# Patient Record
Sex: FEMALE | Race: WHITE | NOT HISPANIC OR LATINO | Employment: OTHER | URBAN - METROPOLITAN AREA
[De-identification: names, ages, dates, MRNs, and addresses within clinical notes are randomized per-mention and may not be internally consistent; named-entity substitution may affect disease eponyms.]

---

## 2017-02-27 ENCOUNTER — ALLSCRIPTS OFFICE VISIT (OUTPATIENT)
Dept: OTHER | Facility: OTHER | Age: 82
End: 2017-02-27

## 2017-02-27 DIAGNOSIS — J98.4 OTHER DISORDERS OF LUNG: ICD-10-CM

## 2017-02-27 DIAGNOSIS — I10 ESSENTIAL (PRIMARY) HYPERTENSION: ICD-10-CM

## 2017-02-27 DIAGNOSIS — F03.90 DEMENTIA WITHOUT BEHAVIORAL DISTURBANCE (HCC): ICD-10-CM

## 2017-02-27 DIAGNOSIS — R60.0 LOCALIZED EDEMA: ICD-10-CM

## 2017-02-27 DIAGNOSIS — Z13.6 ENCOUNTER FOR SCREENING FOR CARDIOVASCULAR DISORDERS: ICD-10-CM

## 2017-07-20 ENCOUNTER — ALLSCRIPTS OFFICE VISIT (OUTPATIENT)
Dept: OTHER | Facility: OTHER | Age: 82
End: 2017-07-20

## 2017-09-05 ENCOUNTER — ALLSCRIPTS OFFICE VISIT (OUTPATIENT)
Dept: OTHER | Facility: OTHER | Age: 82
End: 2017-09-05

## 2017-09-14 ENCOUNTER — GENERIC CONVERSION - ENCOUNTER (OUTPATIENT)
Dept: OTHER | Facility: OTHER | Age: 82
End: 2017-09-14

## 2017-09-14 DIAGNOSIS — M25.562 PAIN IN LEFT KNEE: ICD-10-CM

## 2017-09-14 DIAGNOSIS — Z91.81 HISTORY OF FALLING: ICD-10-CM

## 2017-09-20 ENCOUNTER — GENERIC CONVERSION - ENCOUNTER (OUTPATIENT)
Dept: OTHER | Facility: OTHER | Age: 82
End: 2017-09-20

## 2017-11-17 ENCOUNTER — APPOINTMENT (EMERGENCY)
Dept: CT IMAGING | Facility: HOSPITAL | Age: 82
DRG: 291 | End: 2017-11-17
Payer: COMMERCIAL

## 2017-11-17 ENCOUNTER — APPOINTMENT (EMERGENCY)
Dept: RADIOLOGY | Facility: HOSPITAL | Age: 82
DRG: 291 | End: 2017-11-17
Payer: COMMERCIAL

## 2017-11-17 ENCOUNTER — HOSPITAL ENCOUNTER (INPATIENT)
Facility: HOSPITAL | Age: 82
LOS: 11 days | Discharge: NURSING, REHAB OR LONG-TERM CARE | DRG: 291 | End: 2017-11-28
Attending: EMERGENCY MEDICINE | Admitting: INTERNAL MEDICINE
Payer: COMMERCIAL

## 2017-11-17 DIAGNOSIS — F02.80 ALZHEIMER DISEASE (HCC): ICD-10-CM

## 2017-11-17 DIAGNOSIS — G30.9 ALZHEIMER DISEASE (HCC): ICD-10-CM
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
## 2017-11-17 DIAGNOSIS — R53.1 GENERALIZED WEAKNESS: ICD-10-CM

## 2017-11-17 DIAGNOSIS — I50.9 CHF EXACERBATION (HCC): Primary | ICD-10-CM

## 2017-11-17 PROBLEM — R33.9 URINARY RETENTION: Status: ACTIVE | Noted: 2017-11-17

## 2017-11-17 LAB
ALBUMIN SERPL BCP-MCNC: 2 G/DL (ref 3.5–5)
ALP SERPL-CCNC: 109 U/L (ref 46–116)
ALT SERPL W P-5'-P-CCNC: 24 U/L (ref 12–78)
ANION GAP SERPL CALCULATED.3IONS-SCNC: 7 MMOL/L (ref 4–13)
APTT PPP: 30 SECONDS (ref 23–35)
AST SERPL W P-5'-P-CCNC: 36 U/L (ref 5–45)
BACTERIA UR QL AUTO: ABNORMAL /HPF
BASOPHILS # BLD AUTO: 0.03 THOUSANDS/ΜL (ref 0–0.1)
BASOPHILS NFR BLD AUTO: 0 % (ref 0–1)
BILIRUB SERPL-MCNC: 0.31 MG/DL (ref 0.2–1)
BILIRUB UR QL STRIP: ABNORMAL
BUN SERPL-MCNC: 23 MG/DL (ref 5–25)
CALCIUM SERPL-MCNC: 8.4 MG/DL (ref 8.3–10.1)
CHLORIDE SERPL-SCNC: 103 MMOL/L (ref 100–108)
CLARITY UR: CLEAR
CO2 SERPL-SCNC: 28 MMOL/L (ref 21–32)
COLOR UR: ABNORMAL
CREAT SERPL-MCNC: 0.79 MG/DL (ref 0.6–1.3)
EOSINOPHIL # BLD AUTO: 0.02 THOUSAND/ΜL (ref 0–0.61)
EOSINOPHIL NFR BLD AUTO: 0 % (ref 0–6)
ERYTHROCYTE [DISTWIDTH] IN BLOOD BY AUTOMATED COUNT: 15.4 % (ref 11.6–15.1)
GFR SERPL CREATININE-BSD FRML MDRD: 68 ML/MIN/1.73SQ M
GLUCOSE SERPL-MCNC: 102 MG/DL (ref 65–140)
GLUCOSE UR STRIP-MCNC: NEGATIVE MG/DL
HCT VFR BLD AUTO: 32.1 % (ref 34.8–46.1)
HGB BLD-MCNC: 10.1 G/DL (ref 11.5–15.4)
HGB UR QL STRIP.AUTO: NEGATIVE
HYALINE CASTS #/AREA URNS LPF: ABNORMAL /LPF
INR PPP: 1.16 (ref 0.86–1.16)
KETONES UR STRIP-MCNC: NEGATIVE MG/DL
LEUKOCYTE ESTERASE UR QL STRIP: ABNORMAL
LYMPHOCYTES # BLD AUTO: 1.33 THOUSANDS/ΜL (ref 0.6–4.47)
LYMPHOCYTES NFR BLD AUTO: 11 % (ref 14–44)
MCH RBC QN AUTO: 27.7 PG (ref 26.8–34.3)
MCHC RBC AUTO-ENTMCNC: 31.5 G/DL (ref 31.4–37.4)
MCV RBC AUTO: 88 FL (ref 82–98)
MONOCYTES # BLD AUTO: 0.94 THOUSAND/ΜL (ref 0.17–1.22)
MONOCYTES NFR BLD AUTO: 7 % (ref 4–12)
MUCOUS THREADS UR QL AUTO: ABNORMAL
NEUTROPHILS # BLD AUTO: 10.31 THOUSANDS/ΜL (ref 1.85–7.62)
NEUTS SEG NFR BLD AUTO: 82 % (ref 43–75)
NITRITE UR QL STRIP: NEGATIVE
NON-SQ EPI CELLS URNS QL MICRO: ABNORMAL /HPF
NRBC BLD AUTO-RTO: 0 /100 WBCS
NT-PROBNP SERPL-MCNC: 3606 PG/ML
PH UR STRIP.AUTO: 5.5 [PH] (ref 4.5–8)
PLATELET # BLD AUTO: 301 THOUSANDS/UL (ref 149–390)
PMV BLD AUTO: 9.1 FL (ref 8.9–12.7)
POTASSIUM SERPL-SCNC: 4 MMOL/L (ref 3.5–5.3)
PROT SERPL-MCNC: 6.6 G/DL (ref 6.4–8.2)
PROT UR STRIP-MCNC: ABNORMAL MG/DL
PROTHROMBIN TIME: 14.8 SECONDS (ref 12.1–14.4)
RBC # BLD AUTO: 3.64 MILLION/UL (ref 3.81–5.12)
RBC #/AREA URNS AUTO: ABNORMAL /HPF
SODIUM SERPL-SCNC: 138 MMOL/L (ref 136–145)
SP GR UR STRIP.AUTO: 1.02 (ref 1–1.03)
SPECIMEN SOURCE: NORMAL
TROPONIN I BLD-MCNC: 0.02 NG/ML (ref 0–0.08)
UROBILINOGEN UR QL STRIP.AUTO: 1 E.U./DL
WBC # BLD AUTO: 12.63 THOUSAND/UL (ref 4.31–10.16)
WBC #/AREA URNS AUTO: ABNORMAL /HPF

## 2017-11-17 PROCEDURE — 93005 ELECTROCARDIOGRAM TRACING: CPT | Performed by: EMERGENCY MEDICINE

## 2017-11-17 PROCEDURE — 36415 COLL VENOUS BLD VENIPUNCTURE: CPT | Performed by: EMERGENCY MEDICINE

## 2017-11-17 PROCEDURE — 85610 PROTHROMBIN TIME: CPT | Performed by: EMERGENCY MEDICINE

## 2017-11-17 PROCEDURE — 87040 BLOOD CULTURE FOR BACTERIA: CPT | Performed by: PHYSICIAN ASSISTANT

## 2017-11-17 PROCEDURE — 84484 ASSAY OF TROPONIN QUANT: CPT

## 2017-11-17 PROCEDURE — 71020 HB CHEST X-RAY 2VW FRONTAL&LATL: CPT

## 2017-11-17 PROCEDURE — 81002 URINALYSIS NONAUTO W/O SCOPE: CPT | Performed by: EMERGENCY MEDICINE

## 2017-11-17 PROCEDURE — 70450 CT HEAD/BRAIN W/O DYE: CPT

## 2017-11-17 PROCEDURE — 80053 COMPREHEN METABOLIC PANEL: CPT | Performed by: EMERGENCY MEDICINE

## 2017-11-17 PROCEDURE — 85025 COMPLETE CBC W/AUTO DIFF WBC: CPT | Performed by: EMERGENCY MEDICINE

## 2017-11-17 PROCEDURE — 99285 EMERGENCY DEPT VISIT HI MDM: CPT

## 2017-11-17 PROCEDURE — 81001 URINALYSIS AUTO W/SCOPE: CPT

## 2017-11-17 PROCEDURE — 83880 ASSAY OF NATRIURETIC PEPTIDE: CPT | Performed by: EMERGENCY MEDICINE

## 2017-11-17 PROCEDURE — 85730 THROMBOPLASTIN TIME PARTIAL: CPT | Performed by: EMERGENCY MEDICINE

## 2017-11-17 RX ORDER — POTASSIUM CHLORIDE 750 MG/1
10 TABLET, FILM COATED, EXTENDED RELEASE ORAL EVERY OTHER DAY
COMMUNITY
End: 2017-11-28 | Stop reason: HOSPADM

## 2017-11-17 RX ORDER — ONDANSETRON 2 MG/ML
4 INJECTION INTRAMUSCULAR; INTRAVENOUS EVERY 6 HOURS PRN
Status: DISCONTINUED | OUTPATIENT
Start: 2017-11-17 | End: 2017-11-21

## 2017-11-17 RX ORDER — MEMANTINE HYDROCHLORIDE 5 MG/1
10 TABLET ORAL
Status: DISCONTINUED | OUTPATIENT
Start: 2017-11-17 | End: 2017-11-28 | Stop reason: HOSPADM

## 2017-11-17 RX ORDER — FUROSEMIDE 10 MG/ML
20 INJECTION INTRAMUSCULAR; INTRAVENOUS
Status: DISCONTINUED | OUTPATIENT
Start: 2017-11-17 | End: 2017-11-18

## 2017-11-17 RX ORDER — DONEPEZIL HYDROCHLORIDE 5 MG/1
10 TABLET, FILM COATED ORAL
Status: DISCONTINUED | OUTPATIENT
Start: 2017-11-17 | End: 2017-11-28 | Stop reason: HOSPADM

## 2017-11-17 RX ORDER — POTASSIUM CHLORIDE 20 MEQ/1
20 TABLET, EXTENDED RELEASE ORAL
Status: DISCONTINUED | OUTPATIENT
Start: 2017-11-18 | End: 2017-11-25

## 2017-11-17 RX ORDER — FUROSEMIDE 20 MG/1
20 TABLET ORAL DAILY
COMMUNITY
End: 2017-11-28 | Stop reason: HOSPADM

## 2017-11-17 RX ORDER — AMLODIPINE BESYLATE 5 MG/1
5 TABLET ORAL DAILY
COMMUNITY
End: 2017-11-28 | Stop reason: HOSPADM

## 2017-11-17 RX ORDER — ACETAMINOPHEN 325 MG/1
650 TABLET ORAL EVERY 4 HOURS PRN
Status: DISCONTINUED | OUTPATIENT
Start: 2017-11-17 | End: 2017-11-21

## 2017-11-17 RX ORDER — HEPARIN SODIUM 5000 [USP'U]/ML
5000 INJECTION, SOLUTION INTRAVENOUS; SUBCUTANEOUS EVERY 8 HOURS SCHEDULED
Status: DISCONTINUED | OUTPATIENT
Start: 2017-11-17 | End: 2017-11-28 | Stop reason: HOSPADM

## 2017-11-17 RX ORDER — CALCIUM CARBONATE 200(500)MG
1000 TABLET,CHEWABLE ORAL DAILY PRN
Status: DISCONTINUED | OUTPATIENT
Start: 2017-11-17 | End: 2017-11-28 | Stop reason: HOSPADM

## 2017-11-17 RX ADMIN — HEPARIN SODIUM 5000 UNITS: 5000 INJECTION, SOLUTION INTRAVENOUS; SUBCUTANEOUS at 21:34

## 2017-11-17 RX ADMIN — Medication 1000 MG: at 17:37

## 2017-11-17 RX ADMIN — FUROSEMIDE 20 MG: 10 INJECTION, SOLUTION INTRAMUSCULAR; INTRAVENOUS at 19:34

## 2017-11-17 RX ADMIN — DONEPEZIL HYDROCHLORIDE 10 MG: 5 TABLET, FILM COATED ORAL at 21:33

## 2017-11-17 RX ADMIN — MEMANTINE 10 MG: 5 TABLET ORAL at 21:37

## 2017-11-17 NOTE — ED NOTES
Pt attempted to give urine sample via bedpan  Unable to provide sample, states no urge to go  Spoke with Escobar Marie PA-C and VO to straight cath pt        Divine Barbour RN  11/17/17 1941

## 2017-11-17 NOTE — ED PROVIDER NOTES
History  Chief Complaint   Patient presents with    Fatigue     Pt presents with EMS coming from home, per family pt has been "slowing down" and seems to be becoming weaker  Pt has a productive cough with yellow sputum, denies any CP/SOB/N/V  History provided by:  Patient and EMS personnel  History limited by:  Dementia   used: No    Fatigue   Severity:  Moderate  Onset quality:  Gradual  Duration: unable to specify  Timing:  Constant  Progression:  Worsening  Chronicity:  New  Context comment:  Recentkly with worsening generalized weakness, cough, phlem, dyspnea  Relieved by:  Nothing  Worsened by:  Nothing  Ineffective treatments:  None tried  Associated symptoms: cough and difficulty walking    Associated symptoms: no abdominal pain, no chest pain, no dysuria, no fever, no headaches, no loss of consciousness, no nausea, no seizures, no shortness of breath, no syncope and no vomiting    Cough:     Cough characteristics:  Productive    Sputum characteristics:  Yellow    Severity:  Mild    Onset quality:  Gradual    Timing:  Intermittent    Progression:  Waxing and waning    Chronicity:  New  Risk factors: congestive heart failure        Prior to Admission Medications   Prescriptions Last Dose Informant Patient Reported? Taking? Memantine HCl-Donepezil HCl (NAMZARIC) 14-10 MG CP24   Yes Yes   Sig: Take by mouth   amLODIPine (NORVASC) 5 mg tablet   Yes Yes   Sig: Take 5 mg by mouth daily   furosemide (LASIX) 20 mg tablet   Yes Yes   Sig: Take 20 mg by mouth daily   potassium chloride (K-DUR) 10 mEq tablet   Yes Yes   Sig: Take 10 mEq by mouth every other day        Facility-Administered Medications: None       Past Medical History:   Diagnosis Date    Alzheimer disease     CHF [facility]congestive heart failure) ([facility] [address] )     Hypertension        History reviewed  No pertinent surgical history  History reviewed  No pertinent family history    I have reviewed and agree with the history as documented  Social History   Substance Use Topics    Smoking status: Current Every Day Smoker     Packs/day: 1 00    Smokeless tobacco: Not on file    Alcohol use No        Review of Systems   Unable to perform ROS: Dementia   Constitutional: Positive for fatigue  Negative for fever  Respiratory: Positive for cough  Negative for shortness of breath  Cardiovascular: Negative for chest pain and syncope  Gastrointestinal: Negative for abdominal pain, nausea and vomiting  Genitourinary: Negative for dysuria  Neurological: Negative for seizures, loss of consciousness and headaches  All other systems reviewed and are negative  Physical Exam  ED Triage Vitals [11/17/17 1406]   Temperature Pulse Respirations Blood Pressure SpO2   98 3 °F (36 8 °C) 80 22 101/52 91 %      Temp Source Heart Rate Source Patient Position - Orthostatic VS BP Location FiO2 (%)   Oral Monitor Lying Right arm --      Pain Score       No Pain           Orthostatic Vital Signs  Vitals:    11/17/17 1521 11/17/17 1625 11/17/17 1708 11/17/17 1814   BP: 106/59 104/52 128/58 131/57   Pulse: 85 96 81 94   Patient Position - Orthostatic VS: Lying Lying Lying Lying       Physical Exam   Constitutional: She is oriented to person, place, and time  She appears well-developed and well-nourished  No distress  HENT:   Head: Normocephalic and atraumatic  Eyes: EOM are normal    Neck: Normal range of motion  Neck supple  Cardiovascular: Normal rate, regular rhythm, normal heart sounds and intact distal pulses  Pulmonary/Chest: Effort normal and breath sounds normal    Abdominal: Soft  Bowel sounds are normal  There is no tenderness  There is no rebound and no guarding  Musculoskeletal: Normal range of motion  Neurological: She is alert and oriented to person, place, and time  No cranial nerve deficit  Coordination normal    Skin: Skin is warm and dry  Psychiatric: She has a normal mood and affect     Nursing note and vitals reviewed  ED Medications  Medications   potassium chloride (K-DUR,KLOR-CON) CR tablet 20 mEq (not administered)   memantine (NAMENDA) tablet 10 mg (not administered)   donepezil (ARICEPT) tablet 10 mg (not administered)   ceftriaxone (ROCEPHIN) 1 g/50 mL in dextrose IVPB (1,000 mg Intravenous New Bag 11/17/17 1737)   ondansetron (ZOFRAN) injection 4 mg (not administered)   calcium carbonate (TUMS) chewable tablet 1,000 mg (not administered)   furosemide (LASIX) injection 20 mg (not administered)   heparin (porcine) subcutaneous injection 5,000 Units (not administered)   acetaminophen (TYLENOL) tablet 650 mg (not administered)       Diagnostic Studies  Results Reviewed     Procedure Component Value Units Date/Time    Platelet count [[id number]]     Lab Status:  No result Specimen:  Blood     Urine Microscopic [[id number]]  (Abnormal) Collected:  11/17/17 1831    Lab Status:  Final result Specimen:  Urine from Urine, Other Updated:  11/17/17 1739     RBC, UA None Seen /hpf      WBC, UA 4-10 (A) /hpf      Epithelial Cells Occasional /hpf      Bacteria, UA Moderate (A) /hpf      Hyaline Casts, UA 1-2 (A) /lpf      MUCOUS THREADS Occasional    Blood culture [[id number]] Collected:  11/17/17 1731    Lab Status: In process Specimen:  Blood from Arm, Right Updated:  11/17/17 1734    Blood culture [[id number]] Collected:  11/17/17 1429    Lab Status:   In process Specimen:  Blood from Arm, Left Updated:  11/17/17 1733    POCT urinalysis dipstick [[id number]]  (Abnormal) Resulted:  11/17/17 1716    Lab Status:  Final result Specimen:  Urine Updated:  11/17/17 1717    ED Urine Macroscopic [[id number]]  (Abnormal) Collected:  11/17/17 1831    Lab Status:  Final result Specimen:  Urine Updated:  11/17/17 1715     Color, UA Gail     Clarity, UA Clear     pH, UA 5 5     Leukocytes, UA Trace (A)     Nitrite, UA Negative     Protein,  (2+) (A) mg/dl      Glucose, UA Negative mg/dl      Ketones, UA Negative mg/dl      Urobilinogen, UA 1 0 E U /dl      Bilirubin, UA Interference- unable to analyze (A)     Blood, UA Negative     Specific Gravity, UA 1 020    Narrative:       CLINITEK RESULT    B-type natriuretic peptide [[id number]]  (Abnormal) Collected:  11/17/17 1429    Lab Status:  Final result Specimen:  Blood from Arm, Left Updated:  11/17/17 1516     NT-proBNP 3,606 (H) pg/mL     Comprehensive metabolic panel [[id number]]  (Abnormal) Collected:  11/17/17 1429    Lab Status:  Final result Specimen:  Blood from Arm, Left Updated:  11/17/17 1455     Sodium 138 mmol/L      Potassium 4 0 mmol/L      Chloride 103 mmol/L      CO2 28 mmol/L      Anion Gap 7 mmol/L      BUN 23 mg/dL      Creatinine 0 79 mg/dL      Glucose 102 mg/dL      Calcium 8 4 mg/dL      AST 36 U/L      ALT 24 U/L      Alkaline Phosphatase 109 U/L      Total Protein 6 6 g/dL      Albumin 2 0 (L) g/dL      Total Bilirubin 0 31 mg/dL      eGFR 68 ml/min/1 73sq m     Narrative:         National Kidney Disease Education Program recommendations are as follows:  GFR calculation is accurate only with a steady state creatinine  Chronic Kidney disease less than 60 ml/min/1 73 sq  meters  Kidney failure less than 15 ml/min/1 73 sq  meters  Protime-INR [[id number]]  (Abnormal) Collected:  11/17/17 1429    Lab Status:  Final result Specimen:  Blood from Arm, Left Updated:  11/17/17 1452     Protime 14 8 (H) seconds      INR 1 16    APTT [[id number]]  (Normal) Collected:  11/17/17 1429    Lab Status:  Final result Specimen:  Blood from Arm, Left Updated:  11/17/17 1452     PTT 30 seconds     Narrative:          Therapeutic Heparin Range = 60-90 seconds    POCT troponin [[id number]]  (Normal) Collected:  11/17/17 1432    Lab Status:  Final result Updated:  11/17/17 1445     POC Troponin I 0 02 ng/ml      Specimen Type VENOUS    Narrative:         [employer] i-Stat handheld analyzer 99% cutoff is > 0 08ng/mL in network Emergency Departments    o cTnI 99% cutoff is useful only when applied to patients in the clinical setting of myocardial ischemia  o cTnI 99% cutoff should be interpreted in the context of clinical history, ECG findings and possibly cardiac imaging to establish correct diagnosis  o cTnI 99% cutoff may be suggestive but clearly not indicative of a coronary event without the clinical setting of myocardial ischemia  CBC and differential [[id number]]  (Abnormal) Collected:  11/17/17 1429    Lab Status:  Final result Specimen:  Blood from Arm, Left Updated:  11/17/17 1437     WBC 12 63 (H) Thousand/uL      RBC 3 64 (L) Million/uL      Hemoglobin 10 1 (L) g/dL      Hematocrit 32 1 (L) %      MCV 88 fL      MCH 27 7 pg      MCHC 31 5 g/dL      RDW 15 4 (H) %      MPV 9 1 fL      Platelets 348 Thousands/uL      nRBC 0 /100 WBCs      Neutrophils Relative 82 (H) %      Lymphocytes Relative 11 (L) %      Monocytes Relative 7 %      Eosinophils Relative 0 %      Basophils Relative 0 %      Neutrophils Absolute 10 31 (H) Thousands/µL      Lymphocytes Absolute 1 33 Thousands/µL      Monocytes Absolute 0 94 Thousand/µL      Eosinophils Absolute 0 02 Thousand/µL      Basophils Absolute 0 03 Thousands/µL                  CT head without contrast   Final Result by Renea Espinosa MD (11/17 7127)      No acute intracranial abnormality  Workstation performed: [id number]         XR chest 2 views   Final Result by Addie Ballard MD (11/17 4870)   Increased interstitial and vascular congestion  Patchy infiltrates right mid to lower lung field  Increased soft tissue density also noted in the left infrahilar region  Probable small effusions  Consider CT evaluation           Workstation performed: [id number]                    Procedures  ECG 12 Lead Documentation  Date/Time: 11/17/2017 2:30 PM  Performed by: Estefanía Russell  Authorized by: Estefanía Russell     Indications / Diagnosis:  Weakness  ECG reviewed by me, the ED Provider: yes    Patient location:  ED  Interpretation:     Interpretation: normal    Rate: ECG rate:  79    ECG rate assessment: normal    Rhythm:     Rhythm: sinus rhythm    Ectopy:     Ectopy: none    QRS:     QRS axis:  Normal  Conduction:     Conduction: normal    ST segments:     ST segments:  Normal  T waves:     T waves: normal             Phone Contacts  ED Phone Contact    ED Course  ED Course as of Nov 17 1831 Fri Nov 17, 2017   1438 Hb compared to [facility] AT THE [facility] records from July 2017, was 11, so around baseline  Hemoglobin: (!) 10 1   1448 Family arrived, spoke with them, patient has been feeling weak over week, cough, yellow phlem, fell 3 times in Last week; family feel that patient is getting severe weakness and hard to get around at home  [address], chest x-ray, EKG repeat reviewed, BNP elevated at 3600, pleural effusions on the chest x-ray, consistent with congestive heart failure exacerbation, will admit to Medicine  MDM  Number of Diagnoses or Management Options  CHF exacerbation [facility]): new and requires workup  Diagnosis management comments: Patient with hx of HTN, CHF, Dementia, comes from home, with c/o worsening generalized weakness, cough, phlem, dyspnea; EMS noted low sats on arrival 89-90%, gave Albuterol neb, put on O2 2 l NC, sats came upto 96-98%  On exam,m alert, oriented x2, no focal neuro deficit, intact cranial nerve and peripeheral motor/sensory exam; Lungs diminished entry at bases; no significant peripheral edema; CVS RRR, Abd soft, NTND  D/D: Pneumonia, CHF Exacerbation, COPD, Electrolyte/Metabolic imbalance, YANNI, Dehydration  We will check labs, CBC, CMP, Trop, EKG, CXR, Urine; would need admission as requiring oxygen at this point         Amount and/or Complexity of Data Reviewed  Clinical lab tests: ordered and reviewed  Tests in the radiology section of CPT®: ordered and reviewed  Tests in the medicine section of CPT®: ordered and reviewed  Obtain history from someone other than the patient: yes (Family)  Discuss the patient with other providers: yes  Independent visualization of images, tracings, or specimens: yes      CritCare Time    Disposition  Final diagnoses:   CHF exacerbation ([facility] [address] )   Fall   Generalized weakness     Time reflects when diagnosis was documented in both MDM as applicable and the Disposition within this note     Time User Action Codes Description Comment    11/17/2017  4:32 PM Margret Hall Add [I50 9] CHF exacerbation ([facility] [address] )     11/17/2017  6:29 PM Margret Hall Add Taya Kerrie  XXXA] Fall     11/17/2017  6:30 PM Margret Hall Add [R53 1] Generalized weakness       ED Disposition     ED Disposition Condition Comment    Admit  Case was discussed with JOANN Swain) and the patient's admission status was agreed to be Admission Status: inpatient status to the service of Dr Denis Collins  Follow-up Information    None       Current Discharge Medication List      CONTINUE these medications which have NOT CHANGED    Details   amLODIPine (NORVASC) 5 mg tablet Take 5 mg by mouth daily      furosemide (LASIX) 20 mg tablet Take 20 mg by mouth daily      Memantine HCl-Donepezil HCl (NAMZARIC) 14-10 MG CP24 Take by mouth      potassium chloride (K-DUR) 10 mEq tablet Take 10 mEq by mouth every other day             No discharge procedures on file      ED Provider  Electronically Signed by           Isela Bryan MD  11/17/17 9314

## 2017-11-17 NOTE — H&P
History and Physical - [facility] Internal Medicine    Patient Information: Aki Agarwal 80 y o  female MRN: [id number]  Unit/Bed#: ED 15 Encounter: [id number]  Admitting Physician: Rona Melvin PA-C  PCP: Juan Portillo DO  Date of Admission:  11/17/17    Assessment/Plan  Patient is a pleasantly demented 26-year-old female who lives with her family  She is becoming increasingly weak and somnolent  The family also has a caregiver who comes in every morning  They have noticed that her brief overnight has been dry which is unusual for her  EMS was activated and they noticed that her oxygen saturations were hovering at 80-89% the patient was placed on 2 L of oxygen and given a DuoNeb and her oxygen saturations quickly came up  Upon presentation in the ER the patient was afebrile her oxygen saturation was 91% and her blood pressure was a little bit soft 101/52  Her blood pressure did come up to 587 systolic  Her labs were notable for a proBNP of 3606, comprehensive metabolic panel within normal limits  White blood cell count 12 6 hemoglobin 10 1  Troponin negative  CT of the head no acute intracranial abnormality  Chest x-ray increased interstitial and vascular congestion with a patchy infiltrate in the right mid to lower lung field increased soft tissue density also noted in the left infrahilar region probable small effusions  Consider CT evaluation  Also since the patient had not been able to urinate at she was bladder scanned and had 375 cc  Straight cath with dark cloudy louann urine  Positive for leukocytes negative for nitrates      Hospital Problem List:     Principal Problem:    Acute CHF (congestive heart failure) ([facility])  Active Problems:    Hypertension    CHF (congestive heart failure) ([facility] [address] )    Alzheimer disease    Urinary retention      Plan for the Primary Problem(s):  1  Acute congestive heart failure-unknown type    The patient has vascular congestion on chest x-ray, and her lungs are coarse with bilateral basilar crackles  Favor CHF over pneumonia given the urinary retention and fluid buildup  She was mildly hypoxic for EMS  Her proBNP is over 3600  In review of records from [facility] it was in the [address] in July 2017  Cautiously start Lasix 20 mg IV b i d   Monitor given her softer blood pressures  Will get an echocardiogram and kindly consult Cardiology for their input    Plan for Additional Problems:   2  Urinary retention with probable urinary tract infection-bladder scan for 375 cc  Patient's family tells me she has not urinated for approximately 24 hours  Urinalysis is negative for nitrates positive for leukocytes  Patient did have a mildly elevated white blood cell count of 12 but was afebrile  Will treat the patient with ceftriaxone  Blood cultures will be obtained prior to antibiotics  Follow culture and discontinue if cultures negative  3  Essential hypertension with hypotension-blood pressures have been 360-500 systolic  Hold the Norvasc in favor of the Lasix IV b i d   Monitor  4  Alzheimer's disease-the patient is on the combination medication Namzaric  It is non formulary here we will order them separately  5  Asthenia-PT OT consult  6  Tobacco use-the patient's family tells me that she still smokes or more accurately she still lights up cigarettes takes 2 puffs and lets them burn out  Patient's family requested not to give her a nicotine patch if there are concerns that would be providing her with more nicotine and she normally intake in a day    VTE Prophylaxis: Heparin   Code Status: dni/dnr per family at bedside    Anticipated Length of Stay:  Patient will be admitted on an Inpatient basis with an anticipated length of stay of  Greater than 2 midnights  Total Time for Visit, including Counseling / Coordination of Care: 45 minutes  Greater than 50% of this total time spent on direct patient counseling and coordination of care      Chief Complaint:     Weak, cough    History of Present Illness:    Juan Winchester is a 80 y o  female who presents with increasing weakness and somnolence  Cough productive at times  No urine output for approximately 24 hours  The patient always complains of being cold  The patient herself is a poor historian secondary to dementia  The family says she had normally has a very good appetite but at has not been eating as much as she usually does in the past 2 days  The patient herself denies any complaints at present    Review of Systems:  A 12-point review of systems was done  Please see the HPI for the full details  All other systems negative  Past Medical and Surgical History:     Past Medical History:   Diagnosis Date    Alzheimer disease     [facility]congest[facility] heart [facility]) ([facility] [address] )     Hypertension        History reviewed  No pertinent surgical history  Meds/Allergies:    Current Facility-Administered Medications   Medication Dose Route Frequency Provider Last Rate Last Dose    ceftriaxone (ROCEPHIN) 1 g/50 mL in dextrose IVPB  1,000 mg Intravenous Q24H Mai Alonzo PA-C         Current Outpatient Prescriptions   Medication Sig Dispense Refill    amLODIPine (NORVASC) 5 mg tablet Take 5 mg by mouth daily      furosemide (LASIX) 20 mg tablet Take 20 mg by mouth daily      Memantine HCl-Donepezil HCl (NAMZARIC) 14-10 MG CP24 Take by mouth      potassium chloride (K-DUR) 10 mEq tablet Take 10 mEq by mouth       No Known Allergies  History:     Marital Status: Single     Substance Use History:   History   Alcohol Use No     History   Smoking Status    Current Every Day Smoker   Smokeless Tobacco    Not on file     History   Drug Use No       Family History:    History reviewed  No pertinent family history      Physical Exam:   Vitals:   Blood Pressure: 128/58 (11/17/17 1708)  Pulse: 81 (11/17/17 1708)  Temperature: 98 3 °F (36 8 °C) (11/17/17 1406)  Temp Source: Oral (11/17/17 1406)  Respirations: 20 (11/17/17 1708)  Weight - Scale: 65 5 kg (144 lb 6 4 oz) (11/17/17 1406)  SpO2: 99 % (11/17/17 1708)    General Appearance:    Alert, cooperative, no distress, appears older th an stated age   [de-identified]:    Atraumatic, EOMs intact, Mucous membranes moist without   exudates   Neck:   Supple, symmetrical, trachea midline, no adenopathy;     thyroid:  no enlargement/tenderness/nodules; no carotid    bruit or JVD   Lungs:     Coarse with bilateral basilar crackles, increased work of breathing but no respiratory distress   Chest Wall:    No tenderness or deformity    Heart:    Regular rate and rhythm, S1 and S2 normal, no murmur, rub    or gallop   Abdomen:     Soft, non-tender, bowel sounds active all four quadrants,     no masses, no organomegaly   Extremities:   Extremities normal, atraumatic, no cyanosis or edema   Pulses:   2+ and symmetric all extremities   Skin:   Skin color, texture, turgor normal, no rashes or lesions   Lymph nodes:   Cervical, supraclavicular, and axillary nodes normal   Neurologic:   CNII-XII intact, very weak and deconditioned  Difficulty sitting upright to listen to her posterior lungs even with assistance       Lab Results: I have personally reviewed pertinent reports  Results from last 7 days  Lab Units 11/17/17  1429   WBC Thousand/uL 12 63*   HEMOGLOBIN g/dL 10 1*   HEMATOCRIT % 32 1*   PLATELETS Thousands/uL 301   NEUTROS PCT % 82*   LYMPHS PCT % 11*   MONOS PCT % 7   EOS PCT % 0       Results from last 7 days  Lab Units 11/17/17  1429   SODIUM mmol/L 138   POTASSIUM mmol/L 4 0   CHLORIDE mmol/L 103   CO2 mmol/L 28   BUN mg/dL 23   CREATININE mg/dL 0 79   CALCIUM mg/dL 8 4   TOTAL PROTEIN g/dL 6 6   BILIRUBIN TOTAL mg/dL 0 31   ALK PHOS U/L 109   ALT U/L 24   AST U/L 36   GLUCOSE RANDOM mg/dL 102       Results from last 7 days  Lab Units 11/17/17  1429   INR  1 16     Trop 0 02  BNP 3606 (621 July 2017 at [facility] THE [facility])    Imaging: I have personally reviewed pertinent reports        Xr Chest 2 Views    Result Date: 11/17/2017  Narrative: CHEST INDICATION:  Cough, weakness  History taken directly from the electronic ordering system  COMPARISON:  None VIEWS:  Frontal and lateral projections IMAGES:  2 FINDINGS:     Normal cardiac silhouette  Increased interstitial and vascular congestion  Patchy infiltrates right mid to lower lung field  Increased soft tissue density also noted in the left infrahilar region  Probable small effusions  No pneumothorax  Visualized osseous structures appear within normal limits for the patient's age  Impression: Increased interstitial and vascular congestion  Patchy infiltrates right mid to lower lung field  Increased soft tissue density also noted in the left infrahilar region  Probable small effusions  Consider CT evaluation  Workstation performed: [id number]     Ct Head Without Contrast    Result Date: 11/17/2017  Narrative: CT BRAIN - WITHOUT CONTRAST INDICATION:  Fall, weakness  COMPARISON:  None  TECHNIQUE:  CT examination of the brain was performed  In addition to axial images, coronal reformatted images were created and submitted for interpretation  Radiation dose length product (DLP) for this visit:  1048 mGy-cm   This examination, like all CT scans performed in the [facility], was performed utilizing techniques to minimize radiation dose exposure, including the use of iterative reconstruction and automated exposure control  IMAGE QUALITY:  Diagnostic  FINDINGS:  PARENCHYMA:  Decreased attenuation is noted in the supratentorial white matter demonstrating an appearance most consistent with mild microangiopathic change  No intracranial mass, mass effect or midline shift  No CT signs of acute infarction  There is no parenchymal hemorrhage  VENTRICLES AND EXTRA-AXIAL SPACES:  Normal for patient's age  VISUALIZED ORBITS AND PARANASAL SINUSES:  Unremarkable   CALVARIUM AND EXTRACRANIAL SOFT TISSUES:   Normal      Impression: No acute intracranial abnormality  Workstation performed: [id number]         [employer] Records Reviewed: Yes     This note has been constructed using a voice recognition system

## 2017-11-18 ENCOUNTER — APPOINTMENT (INPATIENT)
Dept: CT IMAGING | Facility: HOSPITAL | Age: 82
DRG: 291 | End: 2017-11-18
Payer: COMMERCIAL

## 2017-11-18 LAB
ANION GAP SERPL CALCULATED.3IONS-SCNC: 7 MMOL/L (ref 4–13)
BUN SERPL-MCNC: 22 MG/DL (ref 5–25)
CALCIUM SERPL-MCNC: 8.1 MG/DL (ref 8.3–10.1)
CHLORIDE SERPL-SCNC: 103 MMOL/L (ref 100–108)
CO2 SERPL-SCNC: 28 MMOL/L (ref 21–32)
CREAT SERPL-MCNC: 0.67 MG/DL (ref 0.6–1.3)
ERYTHROCYTE [DISTWIDTH] IN BLOOD BY AUTOMATED COUNT: 15.5 % (ref 11.6–15.1)
GFR SERPL CREATININE-BSD FRML MDRD: 80 ML/MIN/1.73SQ M
GLUCOSE SERPL-MCNC: 100 MG/DL (ref 65–140)
GLUCOSE SERPL-MCNC: 300 MG/DL (ref 65–140)
GLUCOSE SERPL-MCNC: 89 MG/DL (ref 65–140)
HCT VFR BLD AUTO: 31.9 % (ref 34.8–46.1)
HGB BLD-MCNC: 9.9 G/DL (ref 11.5–15.4)
MCH RBC QN AUTO: 27.3 PG (ref 26.8–34.3)
MCHC RBC AUTO-ENTMCNC: 31 G/DL (ref 31.4–37.4)
MCV RBC AUTO: 88 FL (ref 82–98)
PLATELET # BLD AUTO: 298 THOUSANDS/UL (ref 149–390)
PMV BLD AUTO: 8.9 FL (ref 8.9–12.7)
POTASSIUM SERPL-SCNC: 3.8 MMOL/L (ref 3.5–5.3)
RBC # BLD AUTO: 3.62 MILLION/UL (ref 3.81–5.12)
SODIUM SERPL-SCNC: 138 MMOL/L (ref 136–145)
WBC # BLD AUTO: 14.37 THOUSAND/UL (ref 4.31–10.16)

## 2017-11-18 PROCEDURE — 82948 REAGENT STRIP/BLOOD GLUCOSE: CPT

## 2017-11-18 PROCEDURE — 71260 CT THORAX DX C+: CPT

## 2017-11-18 PROCEDURE — G8988 SELF CARE GOAL STATUS: HCPCS

## 2017-11-18 PROCEDURE — 85027 COMPLETE CBC AUTOMATED: CPT | Performed by: PHYSICIAN ASSISTANT

## 2017-11-18 PROCEDURE — G8987 SELF CARE CURRENT STATUS: HCPCS

## 2017-11-18 PROCEDURE — 80048 BASIC METABOLIC PNL TOTAL CA: CPT | Performed by: PHYSICIAN ASSISTANT

## 2017-11-18 PROCEDURE — G8979 MOBILITY GOAL STATUS: HCPCS | Performed by: PHYSICAL THERAPIST

## 2017-11-18 PROCEDURE — G8978 MOBILITY CURRENT STATUS: HCPCS | Performed by: PHYSICAL THERAPIST

## 2017-11-18 PROCEDURE — 97167 OT EVAL HIGH COMPLEX 60 MIN: CPT

## 2017-11-18 PROCEDURE — 97163 PT EVAL HIGH COMPLEX 45 MIN: CPT | Performed by: PHYSICAL THERAPIST

## 2017-11-18 RX ADMIN — ACETAMINOPHEN 650 MG: 325 TABLET, FILM COATED ORAL at 08:50

## 2017-11-18 RX ADMIN — HEPARIN SODIUM 5000 UNITS: 5000 INJECTION, SOLUTION INTRAVENOUS; SUBCUTANEOUS at 05:58

## 2017-11-18 RX ADMIN — Medication 1000 MG: at 17:34

## 2017-11-18 RX ADMIN — HEPARIN SODIUM 5000 UNITS: 5000 INJECTION, SOLUTION INTRAVENOUS; SUBCUTANEOUS at 21:54

## 2017-11-18 RX ADMIN — AZITHROMYCIN MONOHYDRATE 500 MG: 500 INJECTION, POWDER, LYOPHILIZED, FOR SOLUTION INTRAVENOUS at 14:20

## 2017-11-18 RX ADMIN — IOHEXOL 85 ML: 350 INJECTION, SOLUTION INTRAVENOUS at 12:38

## 2017-11-18 RX ADMIN — FUROSEMIDE 20 MG: 10 INJECTION, SOLUTION INTRAMUSCULAR; INTRAVENOUS at 08:50

## 2017-11-18 RX ADMIN — HEPARIN SODIUM 5000 UNITS: 5000 INJECTION, SOLUTION INTRAVENOUS; SUBCUTANEOUS at 13:05

## 2017-11-18 RX ADMIN — MEMANTINE 10 MG: 5 TABLET ORAL at 21:54

## 2017-11-18 RX ADMIN — DONEPEZIL HYDROCHLORIDE 10 MG: 5 TABLET, FILM COATED ORAL at 21:52

## 2017-11-18 RX ADMIN — POTASSIUM CHLORIDE 20 MEQ: 1500 TABLET, EXTENDED RELEASE ORAL at 08:50

## 2017-11-18 RX ADMIN — ACETAMINOPHEN 650 MG: 325 TABLET, FILM COATED ORAL at 13:04

## 2017-11-18 NOTE — CASE MANAGEMENT
Initial Clinical Review  Admission: Date/Time/Statement: 11/17/17 @ 1633   Orders Placed This Encounter   Procedures    Inpatient Admission (expected length of stay for this patient is greater than two midnights)     Standing Status:   Standing     Number of Occurrences:   1     Order Specific Question:   Admitting Physician     Answer:   Itz aCrmona     Order Specific Question:   Level of Care     Answer:   Med Surg [16]     Order Specific Question:   Estimated length of stay     Answer:   More than 2 Midnights     Order Specific Question:   Certification     Answer:   I certify that inpatient services are medically necessary for this patient for a duration of greater than two midnights  See H&P and MD Progress Notes for additional information about the patient's course of treatment  ED: Date/Time/Mode of Arrival:   ED Arrival Information     Expected Arrival Acuity Means of Arrival Escorted By Service Admission Type    11/17/2017 14:05 11/17/2017 14:04 Urgent Ambulance [facility] EMS General Medicine Urgent    Arrival Complaint    WEAKNESS      Chief Complaint:   Chief Complaint   Patient presents with    Fatigue     Pt presents with EMS coming from home, per family pt has been "slowing down" and seems to be becoming weaker  Pt has a productive cough with yellow sputum, denies any CP/SOB/N/V  History of Illness:   Patient is a pleasantly demented 42-year-old female who lives with her family  She is becoming increasingly weak and somnolent  The family also has a caregiver who comes in every morning  They have noticed that her brief overnight has been dry which is unusual for her  EMS was activated and they noticed that her oxygen saturations were hovering at 80-89% the patient was placed on 2 L of oxygen and given a DuoNeb and her oxygen saturations quickly came up  Also since the patient had not been able to urinate at she was bladder scanned and had 375 cc    Straight cath with dark cloudy louann urine  Positive for leukocytes negative for nitrates  ED Vital Signs:   ED Triage Vitals [11/17/17 1406]   Temperature Pulse Respirations Blood Pressure SpO2   98 3 °F (36 8 °C) 80 22 101/52 91 %      Temp Source Heart Rate Source Patient Position - Orthostatic VS BP Location FiO2 (%)   Oral Monitor Lying Right arm --      Pain Score       No Pain        Wt Readings from Last 1 Encounters:   11/18/17 59 kg (130 lb 1 1 oz)   Vital Signs (abnormal):   T 99 5  Abnormal Labs/Diagnostic Test Results:   WBC 12 63 HGB 10 1 PT 14 8 ALB 2 0 BNP 3606  U/A+ LEUK, PROT, BILI, BACTERIA  CXR= Increased interstitial and vascular congestion  Patchy infiltrates right mid to lower lung field  Increased soft tissue density also noted in the left infrahilar region  Probable small effusions  Consider CT evaluation  CT HEAD= No acute intracranial abnormality  ED Treatment:   Medication Administration from 11/17/2017 1405 to 11/17/2017 1813       Date/Time Order Dose Route Action Action by Comments     11/17/2017 2458 ceftriaxone (ROCEPHIN) 1 g/50 mL in dextrose IVPB 1,000 mg Intravenous New Bag Coty Diaz RN       Past Medical/Surgical History: Active Ambulatory Problems     Diagnosis Date Noted    No Active Ambulatory Problems     Resolved Ambulatory Problems     Diagnosis Date Noted    No Resolved Ambulatory Problems     Past Medical History:   Diagnosis Date    Alzheimer disease     [facility] [address] [facility]     Hypertension    Admitting Diagnosis: Weakness [R53 1]  CHF exacerbation (HCC) [I50 9]  Age/Sex: 80 y o  female  Assessment/Plan:   Principal Problem:    Acute CHF (congestive heart failure) ([facility] [address] )  Active Problems:    Hypertension    CHF (congestive heart failure) (HCC)    Alzheimer disease    Urinary retention  Plan for the Primary Problem(s):  1  Acute congestive heart failure-unknown type    The patient has vascular congestion on chest x-ray, and her lungs are coarse with bilateral basilar crackles  Favor CHF over pneumonia given the urinary retention and fluid buildup  She was mildly hypoxic for EMS  Her proBNP is over 3600  In review of records from [facility] it was in the [address] in July 2017  Cautiously start Lasix 20 mg IV b i d   Monitor given her softer blood pressures  Will get an echocardiogram and kindly consult Cardiology for their input  Plan for Additional Problems:   2  Urinary retention with probable urinary tract infection-bladder scan for 375 cc  Patient's family tells me she has not urinated for approximately 24 hours  Urinalysis is negative for nitrates positive for leukocytes  Patient did have a mildly elevated white blood cell count of 12 but was afebrile  Will treat the patient with ceftriaxone  Blood cultures will be obtained prior to antibiotics  Follow culture and discontinue if cultures negative  3  Essential hypertension with hypotension-blood pressures have been 069-634 systolic  Hold the Norvasc in favor of the Lasix IV b i d   Monitor  4  Alzheimer's disease-the patient is on the combination medication Namzaric  It is non formulary here we will order them separately  5  Asthenia-PT OT consult  6  Tobacco use-the patient's family tells me that she still smokes or more accurately she still lights up cigarettes takes 2 puffs and lets them burn out  Patient's family requested not to give her a nicotine patch if there are concerns that would be providing her with more nicotine and she normally intake in a day   Anticipated Length of Stay:  Patient will be admitted on an Inpatient basis with an anticipated length of stay of  Greater than 2 midnights    Admission Orders:  TELEMETRY  BLADDER SCAN  CONSULT CARDIO  CHF EDUCATION  FALL PRECAUTIONS  PT/OT EVAL & TX  Scheduled Meds:   cefTRIAXone 1,000 mg Intravenous Q24H   donepezil 10 mg Oral HS   furosemide 20 mg Intravenous BID (diuretic)   heparin (porcine) 5,000 Units Subcutaneous [facility] memantine 10 mg Oral HS   potassium chloride 20 mEq Oral Daily With Breakfast     Continuous Infusions:    PRN Meds:   acetaminophen    calcium carbonate    ondansetron

## 2017-11-18 NOTE — OCCUPATIONAL THERAPY NOTE
OccupationalTherapy Evaluation(time=0164-9760)     Patient Name: Katie Oseguera  DHVJY'A Date: 11/18/2017  Problem List  Patient Active Problem List   Diagnosis    Hypertension    [facility]    Alzheimer disease    Acute CHF (congestive heart failure) ([facility] )    Urinary retention     Past Medical History  Past Medical History:   Diagnosis Date    Alzheimer disease     CHF (congestive heart failure) ([facility] )     Hypertension      Past Surgical History  History reviewed  No pertinent surgical history  11/18/17 5126   Note Type   Note type Eval only   Restrictions/Precautions   Other Precautions Fall Risk;Pain;Cognitive; Chair Alarm; Bed Alarm   Pain Assessment   Pain Assessment FLACC   Pain Rating: FLACC (Rest) - Face 0   Pain Rating: FLACC (Rest) - Legs 0   Pain Rating: FLACC (Rest) - Activity 0   Pain Rating: FLACC (Rest) - Cry 1   Pain Rating: FLACC (Rest) - Consolability 0   Score: FLACC (Rest) 1   Home Living   Type of Home House  (in-law suite)   Home Layout Ramped entrance   Home Equipment Walker;Cane;Wheelchair-manual   Prior Function   Lives With Family  (brother, RENEE)   ADL Assistance Needs assistance  (HHA/1-2hrs/daily)   Falls in the last 6 months >10   Lifestyle   Autonomy PTA family(brother, RENEE) states pt had assistance with her ADLs, transfers, ambulation--HHA; recent(last 2-3wks) decline in overall function;+falls=10, neg home alone   Reciprocal Relationships supportive brother, 0 children   Service to Others worked as a     Intrinsic Gratification watching TV; decline in leisure activities 2* macular degeneration   Psychosocial   Psychosocial (WDL) X   Patient Behaviors/Mood Flat affect; Cooperative   Subjective   Subjective "My shoulder is sore "   ADL   Where Assessed Edge of bed   Eating Assistance 5  Supervision/Setup   Grooming Assistance 5  Supervision/Setup   UB Bathing Assistance 3  Moderate Assistance   LB Bathing Assistance 2  Maximal Assistance   UB Dressing Assistance 3  Moderate Assistance   LB Dressing Assistance 2  Maximal Assistance   Bed Mobility   Rolling R 3  Moderate assistance   Additional items Assist x 1   Rolling L 3  Moderate assistance   Additional items Assist x 1   Supine to Sit 3  Moderate assistance   Additional items Assist x 2   Transfers   Sit to Stand 4  Minimal assistance   Additional items Assist x 2   Stand to Sit 4  Minimal assistance   Additional items Assist x 2   Functional Mobility   Functional Mobility 4  Minimal assistance   Additional Comments x2   Additional items Rolling walker   Balance   Static Sitting Good   Dynamic Sitting Fair +   Static Standing Fair   Dynamic Standing Fair -   Activity Tolerance   Activity Tolerance Patient limited by fatigue;Patient limited by pain   Medical Staff Made Aware Robert   RUE Assessment   RUE Assessment X  (poor AROM R shr(i e 10-20*), elbow-distal=WFLs)   RUE Strength   RUE Overall Strength (shr=2-/5, elbow-distal=3/5)   LUE Assessment   LUE Assessment WFL   LUE Strength   LUE Overall Strength (3+/5 throughout)   Hand Function   Gross Motor Coordination Functional   Fine Motor Coordination Functional   Sensation   Light Touch No apparent deficits   Proprioception   Proprioception No apparent deficits   Vision-Basic Assessment   Current Vision (glasses)   Visual History Macular degeneration   Vision - Complex Assessment   Acuity (poor)   Perception   Inattention/Neglect Appears intact   Cognition   Overall Cognitive Status Impaired   Arousal/Participation Alert   Attention Attends with cues to redirect   Orientation Level Oriented to person   Memory Decreased short term memory;Decreased recall of precautions;Decreased recall of recent events;Decreased long term memory   Following Commands Follows one step commands with increased time or repetition   Comments hx Alzheimer's dementia   Assessment   Limitation Decreased ADL status; Decreased UE ROM; Decreased UE strength;Decreased Safe judgement during ADL;Decreased cognition;Decreased endurance;Decreased high-level ADLs   Prognosis Fair   Assessment Pt is a 86y/o female admitted to the hospital 2* symptoms of increased weakness, lethargy, and decreased O2 sats  Pt noted with R lower lobe CAP and UTI  PTA family(brother, RENEE) states pt had assistance with her ADLs, transfers, ambulation--HHA; recent(last 2-3wks) decline in overall function;+falls=10, neg home alone  During initial eval, pt demonstrated deficits with her functional balance, functional mobility, ADL status, R UE ROM/strength, activity tolerance(currently fair=15-20mins), transfer safety, and cognition(i e memory, problem-solving, judgement/safety)  Pt would benefit from continued OT tx for the above deficits  3-5xwk/1-2wks  Goals   Patient Goals "I don't know "   STG Time Frame 3-5   Short Term Goal #1 Pt will demonstrate mod I with their bed mobility to facilitate EOB ADLs  Short Term Goal #2 Pt will demonstrate mod I with their sit-stand transfers to assist with completion of their LE dressing  Short Term Goal  Pt will demonstrate improved activity tolerance to good(20-30mins) and standing tolerance to 3-5mins to assist with ADLs  LTG Time Frame (5-10days)   Long Term Goal #1 Pt will demonstrate improved functional balance by 1 grade to assist with ADLs/transfers  Long Term Goal #2 Pt will demonstrate supervision with her UE and Tao with her LE bathing/dressing  Long Term Goal Pt will demonstrate proper walker/transfer safety 100% of the time  Plan   Treatment Interventions ADL retraining;Functional transfer training; Endurance training;UE strengthening/ROM; Cognitive reorientation;Patient/family training;Equipment evaluation/education; Compensatory technique education   Goal Expiration Date 11/28/17   Treatment Day 0   OT Frequency 3-5x/wk   Recommendation   OT Discharge Recommendation Short Term Rehab   Barthel Index   Feeding 5   Bathing 0   Grooming Score 0 Dressing Score 5   Bladder Score 5   Bowels Score 5   Toilet Use Score 5   Transfers (Bed/Chair) Score 10   Mobility (Level Surface) Score 0   Stairs Score 0   Barthel Index Score 35   Tracy Chacon, OT

## 2017-11-18 NOTE — PHYSICAL THERAPY NOTE
Physical Therapy Evaluation      Patient Active Problem List   Diagnosis    Hypertension    [facility] [address] [facility]    Alzheimer disease    Acute [facility]estive [facility]) [facility] [address] [facility]    Urinary retention       Past Medical History:   Diagnosis Date    Alzheimer disease     [facility]congestive [facility] failure) [facility] [address] [facility]     Hypertension        History reviewed  No pertinent surgical history  11/18/17 6120   Note Type   Note type Eval only   Pain Assessment   Pain Assessment No/denies pain   Home Living   Type of Home Apartment  (in law suite at relatives house)   Home Layout One level  (no stairs)   Bathroom Equipment Shower chair; Toilet raiser;Grab bars in shower   Home Equipment Walker;Cane;Wheelchair-manual   Prior Function   Level of [place] Needs assistance with IADLs; Needs assistance with ADLs and functional mobility   Lives With [facility] Help From Family;Personal care attendant  (daily 2 hours)   ADL Assistance Needs assistance   IADLs Needs assistance   Falls in the last 6 months >10   Comments pt uses rollator rw  recently declining in strength and energy  numerous falls, seemed improved with medicatoin change per family  pt has macular degeneration and vision is poor  pt needed assist for personal care, house hold chores  Restrictions/Precautions   Other Precautions Fall Risk;Visual impairment;Pain;Multiple lines; Chair Alarm;Cognitive   General   Additional Pertinent History recent falls, now with r shoulder pain   Family/Caregiver Present Yes   Cognition   Overall Cognitive Status Impaired   Arousal/Participation Responsive   Orientation Level Oriented to person;Oriented to situation   RUE Assessment   RUE Assessment X  (pain with elevation, sh strength 2-/5)   LUE Assessment   LUE Assessment WFL   RLE Assessment   RLE Assessment X  (strength 4/5)   LLE Assessment   LLE Assessment X  (strength 4/5)   Coordination   Movements are Fluid and Coordinated 1   Sensation WFL   Bed Mobility   Rolling L 3  Moderate assistance   Additional items Assist x 1; Increased time required;Verbal cues;LE management   Supine to Sit 3  Moderate assistance   Additional items Assist x 2; Increased time required;Verbal cues;LE management   Transfers   Sit to Stand 3  Moderate assistance   Additional items Assist x 2; Increased time required;Verbal cues   Stand to Sit 4  Minimal assistance   Additional items Assist x 2; Increased time required;Verbal cues   Ambulation/Elevation   Gait pattern Short stride; Inconsistent jennifer; Foward flexed  (assist to steer for navigation- vision deficit)   Gait Assistance 4  Minimal assist   Additional items Assist x 1;Verbal cues; Tactile cues   Assistive Device Rolling walker   Distance 17'   Balance   Static Sitting Fair   Dynamic Sitting Fair -   Static Standing Poor +   Dynamic Standing Poor   Ambulatory Poor   Endurance Deficit   Endurance Deficit No   Activity Tolerance   Activity Tolerance Patient tolerated treatment well;Treatment limited secondary to medical complications (Comment)   Medical Staff Made Aware OT   Assessment   Prognosis Fair   Problem List Decreased strength;Decreased endurance; Impaired balance;Decreased mobility; Decreased cognition; Impaired judgement;Decreased safety awareness; Impaired vision;Pain   Assessment pt admitted with progressive lethargy and weakness, falls  pt dx with pneumonia and uti  pt referred to PT  pt was ilving in in law suite adjacent to brother's home  pt had caregiver for 2 hours daily  pt used rollator rw but had numerous falls  pt demonstrated moderate to sever functional limitations due to recent illness, vision deficit and prior debility  pt has deficits in strength, balance, gait sequencing and stability, activity tolerance, vision, r shoulder/rib fx with pain and self care  pt will need skilled PT services and may need rehab  pt was able to transfer with mod assist, amb with min assist and rw for 17'   pt reported r shoulder pain with bed mobility and movement  pt needed assist to steer rw due to vision loss  pt tolerated well  Barriers to Discharge Decreased caregiver support   Goals   Patient Goals less pain and more energy   STG Expiration Date 11/25/17   Short Term Goal #1 bed mobility with min assist  transfers with supervision  amb using rw and min assist for navigation  normal gait pattern for > 100'  improve strength and balance by 1/2 grade, improve activity tolerance to 45 minutes  Plan   Treatment/Interventions Functional transfer training;LE strengthening/ROM; Therapeutic exercise; Endurance training;Cognitive reorientation;Patient/family training;Equipment eval/education; Bed mobility;Gait training;Spoke to nursing;OT   PT Frequency 5x/wk   Recommendation   Recommendation Post acute IP rehab   PT - OK to Discharge Yes  (to rehab)   Modified [place] Scale   Modified [place] Scale 4   Barthel Index   Feeding 5   Bathing 0   Grooming Score 0   Dressing Score 0   Bladder Score 10   Bowels Score 10   Toilet Use Score 5   Transfers (Bed/Chair) Score 5   Mobility (Level Surface) Score 0   Stairs Score 0   Barthel Index Score 35   History: co - morbidities, fall risk, use of assistive device, assist for adl's, cognition, multiple lines  Exam: impairments in locomotion, musculoskeletal, balance, vision, self care, pulmonary  Clinical: unpredictable  Complexity:high      Autumn Luo, PT

## 2017-11-18 NOTE — PROGRESS NOTES
Progress Note - Jori Katz 80 y o  female MRN: [id number]    Unit/Bed#: E4 -01 Encounter: [id number]      Assessment/Plan:  1  Right lower lobe pneumonia community-acquired -with sepsis patient presented with a temperature of 101 4°, leukocytosis with WBC of 59329 -patient otherwise hemodynamically stable  Continue ceftriaxone Zithromax  Legionella and strep pneumonia negative  No evidence of CHF currently  Cardiology input appreciated  blood cultures sent  Plan for Additional Problems:   2  Urinary retention with probable urinary tract infection-bladder scan for 375 cc  Urinalysis is negative for nitrates positive for leukocytes  Patient did have a mildly elevated white blood cell count of 12 but was afebrile  Blood cultures sent  Follow culture     3  Essential hypertension with hypotension-blood pressures have been 070-345 systolic  Continue Norvasc     4  Alzheimer's disease-the patient is on the combination medication Namzaric  It is non formulary here we will order them separately    5  Asthenia-PT OT consult    6  Tobacco use-the patient's family tells me that she still smokes or more accurately she still lights up cigarettes takes 2 puffs and lets them burn out  Patient declines a nicotine patch    7-elevated BNP- probable diastolic heart failure-versus multifactorial secondary to the pneumonia  Echocardiogram ordered  Diuretics have been discontinued by Cardiology  8-acute hypoxic respiratory failure on presentation secondary to 1 this is now resolved  Continue IV antibiotic  No evidence of CHF exacerbation currently    Subjective:  Patient denies any complaints  Having a temperature of 101 4°  No tachypnea  On oxygen 2 liters/minute  Physical Exam:   Vitals: Blood pressure 109/54, pulse 74, temperature (!) 101 4 °F (38 6 °C), temperature source Temporal, resp  rate 20, height 5' 4" (1 626 m), weight 59 kg (130 lb 1 1 oz), SpO2 96 %  ,Body mass index is 22 33 kg/m²  Gen:  Pleasant, non-tachypnic, non-dyspnic  Conversant  Heart: regular rate and rhythm, S1S2 present, no murmur, rub or gallop  Lungs:  Bibasilar rales  Abd: soft, non-tender, non-distended  NABS, no guarding, rebound or peritoneal signs  Extremities: no clubbing, cyanosis or edema  2+pedal pulses bilaterally  Full range of motion  Neuro: awake, alert and oriented  Cranial nerves 2-12 intact  Strength and sensation grossly intact  Skin: warm and dry: no petechiae, purpura and rash      LABS:     Results from last 7 days  Lab Units 11/18/17  0615 11/17/17  1429   WBC Thousand/uL 14 37* 12 63*   HEMOGLOBIN g/dL 9 9* 10 1*   HEMATOCRIT % 31 9* 32 1*   PLATELETS Thousands/uL 298 301       Results from last 7 days  Lab Units 11/18/17  0615 11/17/17  1429   SODIUM mmol/L 138 138   POTASSIUM mmol/L 3 8 4 0   CHLORIDE mmol/L 103 103   CO2 mmol/L 28 28   BUN mg/dL 22 23   CREATININE mg/dL 0 67 0 79   GLUCOSE RANDOM mg/dL 100 102   CALCIUM mg/dL 8 1* 8 4       Intake/Output Summary (Last 24 hours) at 11/18/17 1334  Last data filed at 11/18/17 0900   Gross per 24 hour   Intake              180 ml   Output             1084 ml   Net             -904 ml           cefTRIAXone 1,000 mg Intravenous Q24H   donepezil 10 mg Oral HS   heparin (porcine) 5,000 Units Subcutaneous Q8H [address]   memantine 10 mg Oral HS   potassium chloride 20 mEq Oral Daily With Breakfast       Family update- daughter updated  Daughter would like to talk to case management with regards to arranging help at home on discharge

## 2017-11-18 NOTE — PLAN OF CARE
Problem: OCCUPATIONAL THERAPY ADULT  Goal: Performs self-care activities at highest level of function for planned discharge setting  See evaluation for individualized goals  Treatment Interventions: ADL retraining, Functional transfer training, Endurance training, UE strengthening/ROM, Cognitive reorientation, Patient/family training, Equipment evaluation/education, Compensatory technique education          See flowsheet documentation for full assessment, interventions and recommendations  Limitation: Decreased ADL status, Decreased UE ROM, Decreased UE strength, Decreased Safe judgement during ADL, Decreased cognition, Decreased endurance, Decreased high-level ADLs  Prognosis: Fair  Assessment: Pt is a 86y/o female admitted to the hospital 2* symptoms of increased weakness, lethargy, and decreased O2 sats  Pt noted with R lower lobe CAP and UTI  PTA family(brother, RENEE) states pt had assistance with her ADLs, transfers, ambulation--HHA; recent(last 2-3wks) decline in overall function;+falls=10, neg home alone  During initial eval, pt demonstrated deficits with her functional balance, functional mobility, ADL status, R UE ROM/strength, activity tolerance(currently fair=15-20mins), transfer safety, and cognition(i e memory, problem-solving, judgement/safety)  Pt would benefit from continued OT tx for the above deficits  3-5xwk/1-2wks        OT Discharge Recommendation: Short Term Rehab

## 2017-11-18 NOTE — CONSULTS
Electrophysiology-Cardiology (EP)   Odalys Stein 80 y o  female MRN: [id number]  Unit/Bed#: E4 -01 Encounter: [id number]        IMPRESSION:  1  Pneumonia, fever 101 4 with hypoxia on antibiotics  2  Hypoxic respiratory failure  No cardiac history  Elevated proBNP- 3606 maybe multifactorial in this setting or from diastolic dysfunciton, I would not Labile her as acute chf at this point    She did receive  IV lasix and Spo2 improved   No prior  Echo  3  PAC"s, run of atach on telemetry  4  Dementia    DISCUSSION: Her main issue is Pneumonia with acute febrile illness  Her blood pressure is on low side and would avoid aggressive diuresis at this point  No JVD on exam     PLAN:  1  D/c Lasix  2  Check TTE Monday  3  Consider metoprolol for atach after acute febrile illness improves  Cardiology will sign off for now but f/u TTE and if she has significant findings we can address after echo  Referring Physian: Thomas Aguilera MD    Chief Complain/Reason for Referal:   George Jain is a 80 y o     Patient Active Problem List    Diagnosis Date Noted    Acute CHF (congestive heart failure) ([facility] [address] ) 11/17/2017     Priority: Low    Urinary retention 11/17/2017     Priority: Low    Hypertension      Priority: Low    CHF (congestive heart failure) (HCC)      Priority: Low    Alzheimer disease      Priority: Low             Past Medical History:   Diagnosis Date    Alzheimer disease     CHF (congestive heart failure) (HCC)     Hypertension        Prescriptions Prior to Admission   Medication    amLODIPine (NORVASC) 5 mg tablet    furosemide (LASIX) 20 mg tablet    Memantine HCl-Donepezil HCl (NAMZARIC) 14-10 MG CP24    potassium chloride (K-DUR) 10 mEq tablet       Scheduled Meds:  cefTRIAXone 1,000 mg Intravenous Q24H   donepezil 10 mg Oral HS   furosemide 20 mg Intravenous BID (diuretic)   heparin (porcine) 5,000 Units Subcutaneous Q8H [address]   memantine 10 mg Oral HS   potassium chloride 20 mEq Oral Daily With Breakfast     Continuous Infusions:   PRN Meds:   acetaminophen    calcium carbonate    ondansetron  No Known Allergies  I reviewed the Home Medication list in the chart  History reviewed  No pertinent family history  Social History     Social History    Marital status: Single     Spouse name: N/A    Number of children: N/A    Years of education: N/A     Occupational History    Not on file  Social History Main Topics    Smoking status: Current Every Day Smoker     Packs/day: 1 00    Smokeless tobacco: Not on file    Alcohol use No    Drug use: No    Sexual activity: Not on file     Other Topics Concern    Not on file     Social History Narrative    No narrative on file       Review of Systems -12 Point ROS reviewed and are negative or noted in chart except for Pertinent Positives Pertaining to Cardiovascular and Respiratory in HPI above  She is very poor historian overall, main complaint is she wants to go home and has right shoulder pain w movement  Vitals:    11/18/17 0900   BP: 109/54   Pulse:    Resp:    Temp:    SpO2:      Vitals:    11/17/17 1814 11/18/17 0600   Weight: 56 3 kg (124 lb 1 9 oz) 59 kg (130 lb 1 1 oz)     Intake/Output Summary (Last 24 hours) at 11/18/17 1142  Last data filed at 11/18/17 0900   Gross per 24 hour   Intake              180 ml   Output             1084 ml   Net             -904 ml       GEN: Mild acute distress, Alert but disorriented, appears to be upset, SOB w minimal movement  She is agitated  HEENT:Head, neck, ears, oral pharynx: Mucus membranes moist, oral pharynx clear, nares clear  External ears normal  EYES: Pupils equal, sclera anicteric, midline, normal conjuctiva  NECK: No JVD, supple, no obvious masses or thryomegaly or goiter  CARDIOVASCULAR: RRR, No murmur, rub, gallops S1,S2, heart sounds are distan  LUNGS: bibasicla crackles, rhonchi    ABDOMEN: Soft, nondistended, nontender, without obvious organomegaly or ascites  EXTREMITIES/VASCULAR: No edema  Radial pulses intact, pedal pulses difficult to palpate, warm an well perfused  NEURO: Grossly intact, moving all extremiteis equal, face symmetric, alert and responsive, no obvious focal defecits  HEME: No bleeding, bruising, petechia, purpura  SKIN: No significant rashes, warm, no diaphoresis or pallor       Lab Results:     CBC with diff:   Results from last 7 days  Lab Units 11/18/17  0615 11/17/17  1429   WBC Thousand/uL 14 37* 12 63*   HEMOGLOBIN g/dL 9 9* 10 1*   HEMATOCRIT % 31 9* 32 1*   MCV fL 88 88   PLATELETS Thousands/uL 298 301   MCH pg 27 3 27 7   MCHC g/dL 31 0* 31 5   RDW % 15 5* 15 4*   MPV fL 8 9 9 1   NRBC AUTO /100 WBCs  --  0         CMP:  Results from last 7 days  Lab Units 11/18/17  0615 11/17/17  1429   SODIUM mmol/L 138 138   POTASSIUM mmol/L 3 8 4 0   CHLORIDE mmol/L 103 103   CO2 mmol/L 28 28   ANION GAP mmol/L 7 7   BUN mg/dL 22 23   CREATININE mg/dL 0 67 0 79   GLUCOSE RANDOM mg/dL 100 102   CALCIUM mg/dL 8 1* 8 4   AST U/L  --  36   ALT U/L  --  24   ALK PHOS U/L  --  109   TOTAL PROTEIN g/dL  --  6 6   ALBUMIN g/dL  --  2 0*   BILIRUBIN TOTAL mg/dL  --  0 31   EGFR ml/min/1 73sq m 80 68         BMP:  Results from last 7 days  Lab Units 11/18/17  0615 11/17/17  1429   SODIUM mmol/L 138 138   POTASSIUM mmol/L 3 8 4 0   CHLORIDE mmol/L 103 103   CO2 mmol/L 28 28   BUN mg/dL 22 23   CREATININE mg/dL 0 67 0 79   GLUCOSE RANDOM mg/dL 100 102   CALCIUM mg/dL 8 1* 8 4       BNP:   Results Reviewed     Procedure Component Value Units Date/Time    CBC (With Platelets) [[id number]]  (Abnormal) Collected:  11/18/17 0615    Lab Status:  Final result Specimen:  Blood from Arm, Right Updated:  11/18/17 0630     WBC 14 37 (H) Thousand/uL      RBC 3 62 (L) Million/uL      Hemoglobin 9 9 (L) g/dL      Hematocrit 31 9 (L) %      MCV 88 fL      MCH 27 3 pg      MCHC 31 0 (L) g/dL      RDW 15 5 (H) %      Platelets 187 Thousands/uL      MPV 8 9 fL     Platelet count [[id number]]     Lab Status:  No result Specimen:  Blood     Urine Microscopic [[id number]]  (Abnormal) Collected:  11/17/17 1831    Lab Status:  Final result Specimen:  Urine from Urine, Other Updated:  11/17/17 1739     RBC, UA None Seen /hpf      WBC, UA 4-10 (A) /hpf      Epithelial Cells Occasional /hpf      Bacteria, UA Moderate (A) /hpf      Hyaline Casts, UA 1-2 (A) /lpf      MUCOUS THREADS Occasional    Blood culture [[id number]] Collected:  11/17/17 1731    Lab Status: In process Specimen:  Blood from Arm, Right Updated:  11/17/17 1734    Blood culture [[id number]] Collected:  11/17/17 1429    Lab Status:   In process Specimen:  Blood from Arm, Left Updated:  11/17/17 1733    POCT urinalysis dipstick [[id number]]  (Abnormal) Resulted:  11/17/17 1716    Lab Status:  Final result Specimen:  Urine Updated:  11/17/17 1717    ED Urine Macroscopic [[id number]]  (Abnormal) Collected:  11/17/17 1831    Lab Status:  Final result Specimen:  Urine Updated:  11/17/17 1715     Color, UA Gail     Clarity, UA Clear     pH, UA 5 5     Leukocytes, UA Trace (A)     Nitrite, UA Negative     Protein,  (2+) (A) mg/dl      Glucose, UA Negative mg/dl      Ketones, UA Negative mg/dl      Urobilinogen, UA 1 0 E U /dl      Bilirubin, UA Interference- unable to analyze (A)     Blood, UA Negative     Specific Gravity, UA 1 020    Narrative:       CLINITEK RESULT    B-type natriuretic peptide [[id number]]  (Abnormal) Collected:  11/17/17 1429    Lab Status:  Final result Specimen:  Blood from Arm, Left Updated:  11/17/17 1516     NT-proBNP 3,606 (H) pg/mL     Comprehensive metabolic panel [[id number]]  (Abnormal) Collected:  11/17/17 1429    Lab Status:  Final result Specimen:  Blood from Arm, Left Updated:  11/17/17 1455     Sodium 138 mmol/L      Potassium 4 0 mmol/L      Chloride 103 mmol/L      CO2 28 mmol/L      Anion Gap 7 mmol/L      BUN 23 mg/dL      Creatinine 0 79 mg/dL      Glucose 102 mg/dL      Calcium 8 4 mg/dL      AST 36 U/L      ALT 24 U/L      Alkaline Phosphatase 109 U/L      Total Protein 6 6 g/dL      Albumin 2 0 (L) g/dL      Total Bilirubin 0 31 mg/dL      eGFR 68 ml/min/1 73sq m     Narrative:         National Kidney Disease Education Program recommendations are as follows:  GFR calculation is accurate only with a steady state creatinine  Chronic Kidney disease less than 60 ml/min/1 73 sq  meters  Kidney failure less than 15 ml/min/1 73 sq  meters  Protime-INR [[id number]]  (Abnormal) Collected:  11/17/17 1429    Lab Status:  Final result Specimen:  Blood from Arm, Left Updated:  11/17/17 1452     Protime 14 8 (H) seconds      INR 1 16    APTT [[id number]]  (Normal) Collected:  11/17/17 1429    Lab Status:  Final result Specimen:  Blood from Arm, Left Updated:  11/17/17 1452     PTT 30 seconds     Narrative: Therapeutic Heparin Range = 60-90 seconds    POCT troponin [[id number]]  (Normal) Collected:  11/17/17 1432    Lab Status:  Final result Updated:  11/17/17 1445     POC Troponin I 0 02 ng/ml      Specimen Type VENOUS    Narrative:         [employer] i-Stat handheld analyzer 99% cutoff is > 0 08ng/mL in [facility] Emergency Departments    o cTnI 99% cutoff is useful only when applied to patients in the clinical setting of myocardial ischemia  o cTnI 99% cutoff should be interpreted in the context of clinical history, ECG findings and possibly cardiac imaging to establish correct diagnosis  o cTnI 99% cutoff may be suggestive but clearly not indicative of a coronary event without the clinical setting of myocardial ischemia      CBC and differential [[id number]]  (Abnormal) Collected:  11/17/17 1429    Lab Status:  Final result Specimen:  Blood from Arm, Left Updated:  11/17/17 1437     WBC 12 63 (H) Thousand/uL      RBC 3 64 (L) Million/uL      Hemoglobin 10 1 (L) g/dL      Hematocrit 32 1 (L) %      MCV 88 fL      MCH 27 7 pg      MCHC 31 5 g/dL      RDW 15 4 (H) %      MPV 9 1 fL      Platelets 248 Thousands/uL      nRBC 0 /100 WBCs      Neutrophils Relative 82 (H) %      Lymphocytes Relative 11 (L) %      Monocytes Relative 7 %      Eosinophils Relative 0 %      Basophils Relative 0 %      Neutrophils Absolute 10 31 (H) Thousands/µL      Lymphocytes Absolute 1 33 Thousands/µL      Monocytes Absolute 0 94 Thousand/µL      Eosinophils Absolute 0 02 Thousand/µL      Basophils Absolute 0 03 Thousands/µL         No results for input(s): BNP in the last 72 hours  Magnesium:       Coags:   Results from last 7 days  Lab Units 11/17/17  1429   PTT seconds 30   INR  1 16       TSH:         CXR: No cardiomegaly  Patchy infiltrates seen bilateraly, likely Multifocal Pneumonia and superimpsoed pulmonary edema    EKG/TELE: NSR and runs of atrial tachycardia 3-4 beats, and frequent PACs      I have personally reviewed the EKG, Echocardiogram, CXR and Telemetry images directly and the above is my interpretation

## 2017-11-18 NOTE — PLAN OF CARE
Problem: Potential for Falls  Goal: Patient will remain free of falls  INTERVENTIONS:  - Assess patient frequently for physical needs  -  Identify cognitive and physical deficits and behaviors that affect risk of falls    -  [place] fall precautions as indicated by assessment   - Educate patient/family on patient safety including physical limitations  - Instruct patient to call for assistance with activity based on assessment  - Modify environment to reduce risk of injury  - Consider OT/PT consult to assist with strengthening/mobility   Outcome: Progressing      Problem: Prexisting or High Potential for Compromised Skin Integrity  Goal: Skin integrity is maintained or improved  INTERVENTIONS:  - Identify patients at risk for skin breakdown  - Assess and monitor skin integrity  - Assess and monitor nutrition and hydration status  - Monitor labs (i e  albumin)  - Assess for incontinence   - Turn and reposition patient  - Assist with mobility/ambulation  - Relieve pressure over bony prominences  - Avoid friction and shearing  - Provide appropriate hygiene as needed including keeping skin clean and dry  - Evaluate need for skin moisturizer/barrier cream  - Collaborate with interdisciplinary team (i e  Nutrition, Rehabilitation, etc )   - Patient/family teaching   Outcome: Progressing      Problem: PAIN - ADULT  Goal: Verbalizes/displays adequate comfort level or baseline comfort level  Interventions:  - Encourage patient to monitor pain and request assistance  - Assess pain using appropriate pain scale  - Administer analgesics based on type and severity of pain and evaluate response  - Implement non-pharmacological measures as appropriate and evaluate response  - Consider cultural and social influences on pain and pain management  - Notify physician/advanced practitioner if interventions unsuccessful or patient reports new pain   Outcome: Progressing      Problem: INFECTION - ADULT  Goal: Absence or prevention of progression during hospitalization  INTERVENTIONS:  - Assess and monitor for signs and symptoms of infection  - Monitor lab/diagnostic results  - Monitor all insertion sites, i e  indwelling lines, tubes, and drains  - Monitor endotracheal (as able) and nasal secretions for changes in amount and color  - [place] appropriate cooling/warming therapies per order  - Administer medications as ordered  - Instruct and encourage patient and family to use good hand hygiene technique  - Identify and instruct in appropriate isolation precautions for identified infection/condition   Outcome: Progressing    Goal: Absence of fever/infection during neutropenic period  INTERVENTIONS:  - Monitor WBC  - Implement neutropenic guidelines   Outcome: Progressing      Problem: SAFETY ADULT  Goal: Maintain or return to baseline ADL function  INTERVENTIONS:  -  Assess patient's ability to carry out ADLs; assess patient's baseline for ADL function and identify physical deficits which impact ability to perform ADLs (bathing, care of mouth/teeth, toileting, grooming, dressing, etc )  - Assess/evaluate cause of self-care deficits   - Assess range of motion  - Assess patient's mobility; develop plan if impaired  - Assess patient's need for assistive devices and provide as appropriate  - Encourage maximum independence but intervene and supervise when necessary  ¯ Involve family in performance of ADLs  ¯ Assess for home care needs following discharge   ¯ Request OT consult to assist with ADL evaluation and planning for discharge  ¯ Provide patient education as appropriate   Outcome: Progressing    Goal: Maintain or return mobility status to optimal level  INTERVENTIONS:  - Assess patient's baseline mobility status (ambulation, transfers, stairs, etc )    - Identify cognitive and physical deficits and behaviors that affect mobility  - Identify mobility aids required to assist with transfers and/or ambulation (gait belt, sit-to-stand, lift, walker, cane, etc )  - [place] fall precautions as indicated by assessment  - Record patient progress and toleration of activity level on Mobility SBAR; progress patient to next Phase/Stage  - Instruct patient to call for assistance with activity based on assessment  - Request Rehabilitation consult to assist with strengthening/weightbearing, etc    Outcome: Progressing      Problem: DISCHARGE PLANNING  Goal: Discharge to home or other facility with appropriate resources  INTERVENTIONS:  - Identify barriers to discharge w/patient and caregiver  - Arrange for needed discharge resources and transportation as appropriate  - Identify discharge learning needs (meds, wound care, etc )  - Arrange for interpretive services to assist at discharge as needed  - Refer to Case Management Department for coordinating discharge planning if the patient needs post-hospital services based on physician/advanced practitioner order or complex needs related to functional status, cognitive ability, or social support system   Outcome: Progressing      Problem: Knowledge Deficit  Goal: Patient/family/caregiver demonstrates understanding of disease process, treatment plan, medications, and discharge instructions  Complete learning assessment and assess knowledge base    Interventions:  - Provide teaching at level of understanding  - Provide teaching via preferred learning methods   Outcome: Progressing

## 2017-11-18 NOTE — SOCIAL WORK
LEONIDAS received a call from [employer] asking for clinical documentation for IP authorization  Faxed over  She was unsure where to provide auth once approved  CM gave phone number to give to CM and CM will make sure [facility] receives number  AUTH RECEIVED    #[id number] for days 11/17 to 11/22  Left note for Monday CM to please make sure [facility] receives numbers

## 2017-11-19 LAB
ANION GAP SERPL CALCULATED.3IONS-SCNC: 7 MMOL/L (ref 4–13)
ANISOCYTOSIS BLD QL SMEAR: PRESENT
BASOPHILS # BLD MANUAL: 0 THOUSAND/UL (ref 0–0.1)
BASOPHILS NFR MAR MANUAL: 0 % (ref 0–1)
BUN SERPL-MCNC: 24 MG/DL (ref 5–25)
CALCIUM SERPL-MCNC: 8.3 MG/DL (ref 8.3–10.1)
CHLORIDE SERPL-SCNC: 102 MMOL/L (ref 100–108)
CO2 SERPL-SCNC: 27 MMOL/L (ref 21–32)
CREAT SERPL-MCNC: 0.73 MG/DL (ref 0.6–1.3)
EOSINOPHIL # BLD MANUAL: 0 THOUSAND/UL (ref 0–0.4)
EOSINOPHIL NFR BLD MANUAL: 0 % (ref 0–6)
ERYTHROCYTE [DISTWIDTH] IN BLOOD BY AUTOMATED COUNT: 15.6 % (ref 11.6–15.1)
GFR SERPL CREATININE-BSD FRML MDRD: 75 ML/MIN/1.73SQ M
GLUCOSE SERPL-MCNC: 103 MG/DL (ref 65–140)
HCT VFR BLD AUTO: 32.7 % (ref 34.8–46.1)
HGB BLD-MCNC: 10.2 G/DL (ref 11.5–15.4)
LYMPHOCYTES # BLD AUTO: 1.13 THOUSAND/UL (ref 0.6–4.47)
LYMPHOCYTES # BLD AUTO: 7 % (ref 14–44)
MCH RBC QN AUTO: 27.4 PG (ref 26.8–34.3)
MCHC RBC AUTO-ENTMCNC: 31.2 G/DL (ref 31.4–37.4)
MCV RBC AUTO: 88 FL (ref 82–98)
MONOCYTES # BLD AUTO: 0.64 THOUSAND/UL (ref 0–1.22)
MONOCYTES NFR BLD: 4 % (ref 4–12)
NEUTROPHILS # BLD MANUAL: 14.31 THOUSAND/UL (ref 1.85–7.62)
NEUTS BAND NFR BLD MANUAL: 3 % (ref 0–8)
NEUTS SEG NFR BLD AUTO: 86 % (ref 43–75)
NRBC BLD AUTO-RTO: 0 /100 WBCS
PLATELET # BLD AUTO: 362 THOUSANDS/UL (ref 149–390)
PLATELET BLD QL SMEAR: ADEQUATE
PMV BLD AUTO: 9.2 FL (ref 8.9–12.7)
POIKILOCYTOSIS BLD QL SMEAR: PRESENT
POLYCHROMASIA BLD QL SMEAR: PRESENT
POTASSIUM SERPL-SCNC: 3.9 MMOL/L (ref 3.5–5.3)
RBC # BLD AUTO: 3.72 MILLION/UL (ref 3.81–5.12)
SODIUM SERPL-SCNC: 136 MMOL/L (ref 136–145)
TOTAL CELLS COUNTED SPEC: 100
WBC # BLD AUTO: 16.08 THOUSAND/UL (ref 4.31–10.16)

## 2017-11-19 PROCEDURE — 85027 COMPLETE CBC AUTOMATED: CPT | Performed by: HOSPITALIST

## 2017-11-19 PROCEDURE — 85007 BL SMEAR W/DIFF WBC COUNT: CPT | Performed by: HOSPITALIST

## 2017-11-19 PROCEDURE — 80048 BASIC METABOLIC PNL TOTAL CA: CPT | Performed by: HOSPITALIST

## 2017-11-19 RX ADMIN — POTASSIUM CHLORIDE 20 MEQ: 1500 TABLET, EXTENDED RELEASE ORAL at 10:53

## 2017-11-19 RX ADMIN — HEPARIN SODIUM 5000 UNITS: 5000 INJECTION, SOLUTION INTRAVENOUS; SUBCUTANEOUS at 21:07

## 2017-11-19 RX ADMIN — HEPARIN SODIUM 5000 UNITS: 5000 INJECTION, SOLUTION INTRAVENOUS; SUBCUTANEOUS at 05:53

## 2017-11-19 RX ADMIN — HEPARIN SODIUM 5000 UNITS: 5000 INJECTION, SOLUTION INTRAVENOUS; SUBCUTANEOUS at 14:14

## 2017-11-19 RX ADMIN — DONEPEZIL HYDROCHLORIDE 10 MG: 5 TABLET, FILM COATED ORAL at 21:07

## 2017-11-19 RX ADMIN — AZITHROMYCIN MONOHYDRATE 500 MG: 500 INJECTION, POWDER, LYOPHILIZED, FOR SOLUTION INTRAVENOUS at 14:13

## 2017-11-19 RX ADMIN — Medication 1000 MG: at 17:08

## 2017-11-19 RX ADMIN — ACETAMINOPHEN 650 MG: 325 TABLET, FILM COATED ORAL at 10:54

## 2017-11-19 RX ADMIN — MEMANTINE 10 MG: 5 TABLET ORAL at 21:07

## 2017-11-19 NOTE — PROGRESS NOTES
Progress Note - Jori Katz 80 y o  female MRN: [id number]    Unit/Bed#: E4 -01 Encounter: [id number]      Assessment/Plan: 1  Right middle and lower lobe pneumonia- community-acquired -with sepsis- patient presented with a temperature of 101 4°, leukocytosis with WBC of 46693 -patient otherwise hemodynamically stable  Continue ceftriaxone Zithromax#3  Legionella and strep pneumonia negative  blood cultures negative so far  Will continue to monitor temperature and WBC  Plan for Additional Problems:   2   Urinary retention -this has now resolved  No evidence of UTI Patient is voiding spontaneously     Urinalysis is negative for nitrates positive for leukocytes   Patient did have a mildly elevated white blood cell count of 12 but was afebrile       3   Essential hypertension with hypotension-blood pressures have been 358-406 systolic  Continue Norvasc      4   Alzheimer's disease-the patient is on the combination medication Namzaric   It is non formulary here  we will order them separately     5   Asthenia-with frequent falls at home - CT reveals multiple fractures involving the manubrium, sternum, coracoid process of the right shoulder, T2 vertebral body and right ribs-right rib fractures are subacute  the other fractures are age indeterminate  PT OT eval   Patient will need rehab     6   Tobacco use-the patient's family tells me that she still smokes or more accurately she still lights up cigarettes takes 2 puffs and lets them burn out  Patient declines a nicotine patch     7-elevated BNP-likely multifactorial secondary to the pneumonia  Echocardiogram ordered  Diuretics have been discontinued by Cardiology who did not feel that she has any heart failure      8-acute hypoxic respiratory failure on presentation secondary to 1 this is now resolved      PT OT eval      Subjective:  Patient denies any complaints  CT of the chest confirms right middle and lower lobe pneumonia    In addition patient also has multiple fractures involving the manubrium, sternum, coracoid process of the right shoulder, T2 vertebral body and right ribs-right rib fractures are subacute  the other fractures are age indeterminate  I did confirm with the patient's daughter and granddaughter that the patient has been falling frequently at home  Physical Exam:   Vitals: Blood pressure 119/61, pulse 96, temperature 99 2 °F (37 3 °C), temperature source Temporal, resp  rate 21, height 5' 4" (1 626 m), weight 60 5 kg (133 lb 6 1 oz), SpO2 90 %  ,Body mass index is 22 89 kg/m²  Gen: non-tachypnic, non-dyspnic  Conversant  Heart: regular rate and rhythm, S1S2 present, no murmur, rub or gallop  Lungs:  Bibasilar rales  Abd: soft, non-tender, non-distended  NABS, no guarding, rebound or peritoneal signs  Extremities: no clubbing, cyanosis or edema  2+pedal pulses bilaterally  Full range of motion  Neuro: awake, alert and oriented  Cranial nerves 2-12 intact  Strength and sensation grossly intact  Skin: warm and dry: no petechiae, purpura and rash      LABS:     Results from last 7 days  Lab Units 11/19/17  0523 11/18/17  0615 11/17/17  1429   WBC Thousand/uL 16 08* 14 37* 12 63*   HEMOGLOBIN g/dL 10 2* 9 9* 10 1*   HEMATOCRIT % 32 7* 31 9* 32 1*   PLATELETS Thousands/uL 362 298 301       Results from last 7 days  Lab Units 11/19/17  0523 11/18/17  0615 11/17/17  1429   SODIUM mmol/L 136 138 138   POTASSIUM mmol/L 3 9 3 8 4 0   CHLORIDE mmol/L 102 103 103   CO2 mmol/L 27 28 28   BUN mg/dL 24 22 23   CREATININE mg/dL 0 73 0 67 0 79   GLUCOSE RANDOM mg/dL 103 100 102   CALCIUM mg/dL 8 3 8 1* 8 4       Intake/Output Summary (Last 24 hours) at 11/19/17 1213  Last data filed at 11/19/17 0900   Gross per 24 hour   Intake              970 ml   Output              804 ml   Net              166 ml           azithromycin 500 mg Intravenous Q24H   cefTRIAXone 1,000 mg Intravenous Q24H   donepezil 10 mg Oral HS   heparin (porcine) 5,000 Units Subcutaneous Q8H [address]   memantine 10 mg Oral HS   potassium chloride 20 mEq Oral Daily With Breakfast       Family update- daughter and granddaughter in the room-updated

## 2017-11-20 ENCOUNTER — APPOINTMENT (INPATIENT)
Dept: NON INVASIVE DIAGNOSTICS | Facility: HOSPITAL | Age: 82
DRG: 291 | End: 2017-11-20
Payer: COMMERCIAL

## 2017-11-20 LAB
ANION GAP SERPL CALCULATED.3IONS-SCNC: 8 MMOL/L (ref 4–13)
BASOPHILS # BLD AUTO: 0.05 THOUSANDS/ΜL (ref 0–0.1)
BASOPHILS NFR BLD AUTO: 0 % (ref 0–1)
BUN SERPL-MCNC: 21 MG/DL (ref 5–25)
CALCIUM SERPL-MCNC: 8.3 MG/DL (ref 8.3–10.1)
CHLORIDE SERPL-SCNC: 103 MMOL/L (ref 100–108)
CO2 SERPL-SCNC: 25 MMOL/L (ref 21–32)
CREAT SERPL-MCNC: 0.68 MG/DL (ref 0.6–1.3)
EOSINOPHIL # BLD AUTO: 0.07 THOUSAND/ΜL (ref 0–0.61)
EOSINOPHIL NFR BLD AUTO: 1 % (ref 0–6)
ERYTHROCYTE [DISTWIDTH] IN BLOOD BY AUTOMATED COUNT: 15.6 % (ref 11.6–15.1)
GFR SERPL CREATININE-BSD FRML MDRD: 80 ML/MIN/1.73SQ M
GLUCOSE SERPL-MCNC: 96 MG/DL (ref 65–140)
HCT VFR BLD AUTO: 32.6 % (ref 34.8–46.1)
HGB BLD-MCNC: 10.2 G/DL (ref 11.5–15.4)
LYMPHOCYTES # BLD AUTO: 1.45 THOUSANDS/ΜL (ref 0.6–4.47)
LYMPHOCYTES NFR BLD AUTO: 12 % (ref 14–44)
MCH RBC QN AUTO: 27.3 PG (ref 26.8–34.3)
MCHC RBC AUTO-ENTMCNC: 31.3 G/DL (ref 31.4–37.4)
MCV RBC AUTO: 87 FL (ref 82–98)
MONOCYTES # BLD AUTO: 0.76 THOUSAND/ΜL (ref 0.17–1.22)
MONOCYTES NFR BLD AUTO: 7 % (ref 4–12)
NEUTROPHILS # BLD AUTO: 9.45 THOUSANDS/ΜL (ref 1.85–7.62)
NEUTS SEG NFR BLD AUTO: 80 % (ref 43–75)
NRBC BLD AUTO-RTO: 0 /100 WBCS
PLATELET # BLD AUTO: 358 THOUSANDS/UL (ref 149–390)
PMV BLD AUTO: 9.5 FL (ref 8.9–12.7)
POTASSIUM SERPL-SCNC: 4.2 MMOL/L (ref 3.5–5.3)
RBC # BLD AUTO: 3.73 MILLION/UL (ref 3.81–5.12)
SODIUM SERPL-SCNC: 136 MMOL/L (ref 136–145)
WBC # BLD AUTO: 11.78 THOUSAND/UL (ref 4.31–10.16)

## 2017-11-20 PROCEDURE — 97535 SELF CARE MNGMENT TRAINING: CPT

## 2017-11-20 PROCEDURE — 93306 TTE W/DOPPLER COMPLETE: CPT

## 2017-11-20 PROCEDURE — 80048 BASIC METABOLIC PNL TOTAL CA: CPT | Performed by: HOSPITALIST

## 2017-11-20 PROCEDURE — 85025 COMPLETE CBC W/AUTO DIFF WBC: CPT | Performed by: HOSPITALIST

## 2017-11-20 PROCEDURE — 97532 HB COGNITIVE SKILLS DEVELOPMENT: CPT

## 2017-11-20 RX ADMIN — HEPARIN SODIUM 5000 UNITS: 5000 INJECTION, SOLUTION INTRAVENOUS; SUBCUTANEOUS at 14:25

## 2017-11-20 RX ADMIN — HEPARIN SODIUM 5000 UNITS: 5000 INJECTION, SOLUTION INTRAVENOUS; SUBCUTANEOUS at 05:29

## 2017-11-20 RX ADMIN — MEMANTINE 10 MG: 5 TABLET ORAL at 22:06

## 2017-11-20 RX ADMIN — Medication 1000 MG: at 17:17

## 2017-11-20 RX ADMIN — HEPARIN SODIUM 5000 UNITS: 5000 INJECTION, SOLUTION INTRAVENOUS; SUBCUTANEOUS at 22:07

## 2017-11-20 RX ADMIN — POTASSIUM CHLORIDE 20 MEQ: 1500 TABLET, EXTENDED RELEASE ORAL at 07:58

## 2017-11-20 RX ADMIN — AZITHROMYCIN MONOHYDRATE 500 MG: 500 INJECTION, POWDER, LYOPHILIZED, FOR SOLUTION INTRAVENOUS at 14:25

## 2017-11-20 RX ADMIN — DONEPEZIL HYDROCHLORIDE 10 MG: 5 TABLET, FILM COATED ORAL at 22:07

## 2017-11-20 NOTE — CASE MANAGEMENT
Juan Carlos Vargas RN Registered Nurse Signed   Case Management Date of Service: 11/17/2017  9:01 AM         []Hide copied text  Initial Clinical Review  Admission: Date/Time/Statement: 11/17/17 @ 1633         Orders Placed This Encounter   Procedures    Inpatient Admission (expected length of stay for this patient is greater than two midnights)       Standing Status:   Standing       Number of Occurrences:   1       Order Specific Question:   Admitting Physician       Answer:   Ruben Wood       Order Specific Question:   Level of Care       Answer:   Med Surg [16]       Order Specific Question:   Estimated length of stay       Answer:   More than 2 Midnights       Order Specific Question:   Certification       Answer:   I certify that inpatient services are medically necessary for this patient for a duration of greater than two midnights  See H&P and MD Progress Notes for additional information about the patient's course of treatment  ED: Date/Time/Mode of Arrival:             ED Arrival Information      Expected Arrival Acuity Means of Arrival Escorted By Service Admission Type     11/17/2017 14:05 11/17/2017 14:04 Urgent Ambulance [facility] General Medicine Urgent     Arrival Complaint     WEAKNESS       Chief Complaint:        Chief Complaint   Patient presents with    Fatigue       Pt presents with EMS coming from home, per family pt has been "slowing down" and seems to be becoming weaker  Pt has a productive cough with yellow sputum, denies any CP/SOB/N/V      History of Illness:   Patient is a pleasantly demented 42-year-old female who lives with her family  Nic Dies is becoming increasingly weak and somnolent   The family also has a caregiver who comes in every morning  Roe Holley have noticed that her brief overnight has been dry which is unusual for her   EMS was activated and they noticed that her oxygen saturations were hovering at 80-89% the patient was placed on 2 L of oxygen and given a DuoNeb and her oxygen saturations quickly came up  Also since the patient had not been able to urinate at she was bladder scanned and had 375 cc   Straight cath with dark cloudy louann urine   Positive for leukocytes negative for nitrates  ED Vital Signs:            ED Triage Vitals [11/17/17 1406]   Temperature Pulse Respirations Blood Pressure SpO2   98 3 °F (36 8 °C) 80 22 101/52 91 %       Temp Source Heart Rate Source Patient Position - Orthostatic VS BP Location FiO2 (%)   Oral Monitor Lying Right arm --       Pain Score           No Pain                Wt Readings from Last 1 Encounters:   11/18/17 59 kg (130 lb 1 1 oz)   Vital Signs (abnormal):   T 99 5  Abnormal Labs/Diagnostic Test Results:   WBC 12 63 HGB 10 1 PT 14 8 ALB 2 0 BNP 3606  U/A+ LEUK, PROT, BILI, BACTERIA  CXR= Increased interstitial and vascular congestion   Patchy infiltrates right mid to lower lung field   Increased soft tissue density also noted in the left infrahilar region   Probable small effusions   Consider CT evaluation  CT HEAD= No acute intracranial abnormality  ED Treatment:              Medication Administration from 11/17/2017 1405 to 11/17/2017 1813        Date/Time Order Dose Route Action Action by Comments       11/17/2017 7292 ceftriaxone (ROCEPHIN) 1 g/50 mL in dextrose IVPB 1,000 mg Intravenous New Bag Rodolfo Galarza RN         Past Medical/Surgical History:         Active Ambulatory Problems     Diagnosis Date Noted    No Active Ambulatory Problems           Resolved Ambulatory Problems     Diagnosis Date Noted    No Resolved Ambulatory Problems           Past Medical History:   Diagnosis Date    Alzheimer disease      CHF (congestive heart failure) (HCC)      Hypertension     Admitting Diagnosis: Weakness [R53 1]  CHF exacerbation (HCC) [I50 9]  Age/Sex: 80 y o  female  Assessment/Plan:   Principal Problem:    Acute CHF (congestive heart failure) (HCC)  Active Problems:    Hypertension    CHF (congestive heart failure) ([facility] [address] )    Alzheimer disease    Urinary retention  Plan for the Primary Problem(s):  1   Acute congestive heart failure-unknown type   The patient has vascular congestion on chest x-ray, and her lungs are coarse with bilateral basilar crackles   Favor CHF over pneumonia given the urinary retention and fluid buildup  She was mildly hypoxic for EMS  Venus Botello proBNP is over 3600   In review of records from [facility] it was in the 600s in July 2017   Cautiously start Lasix 20 mg IV b i d  June Sole  Monitor given her softer blood pressures   Will get an echocardiogram and kindly consult Cardiology for their input  Plan for Additional Problems:   2   Urinary retention with probable urinary tract infection-bladder scan for 375 cc   Patient's family tells me she has not urinated for approximately 24 hours   Urinalysis is negative for nitrates positive for leukocytes   Patient did have a mildly elevated white blood cell count of 12 but was afebrile   Will treat the patient with ceftriaxone   Blood cultures will be obtained prior to antibiotics   Follow culture and discontinue if cultures negative  3   Essential hypertension with hypotension-blood pressures have been 120-013 systolic   Hold the Norvasc in favor of the Lasix IV b i d  June Sole  Monitor  4   Alzheimer's disease-the patient is on the combination medication Namzaric   It is non formulary here we will order them separately  5   Asthenia-PT OT consult  6   Tobacco use-the patient's family tells me that she still smokes or more accurately she still lights up cigarettes takes 2 puffs and lets them burn out   Patient's family requested not to give her a nicotine patch if there are concerns that would be providing her with more nicotine and she normally intake in a day   Anticipated Length of Stay: Gideon Omer will be admitted on an Inpatient basis with an anticipated length of stay of  Greater than 2 midnights    Admission Orders:  [facility]  CHF EDUCATION  FALL PRECAUTIONS  PT/OT EVAL & TX  Scheduled Meds:   cefTRIAXone 1,000 mg Intravenous Q24H   donepezil 10 mg Oral HS   furosemide 20 mg Intravenous BID (diuretic)   heparin (porcine) 5,000 Units Subcutaneous Q8H [address]   memantine 10 mg Oral HS   potassium chloride 20 mEq Oral Daily With Breakfast      Continuous Infusions:    PRN Meds:   acetaminophen    calcium carbonate    ondansetron                [employer] [facility] in the [facility] by Francis Uribe for 2017  Network Utilization Review Department  Phone: [phone]; Fax [phone]  ATTENTION: The Network Utilization Review Department is now centralized for our 7 Facilities  Make a note that we have a new phone and fax numbers for our Department  Please call with any questions or concerns to [phone] and carefully follow the prompts so that you are directed to the right person  All voicemails are confidential  Fax any determinations, approvals, denials, and requests for initial or continue stay review clinical to [phone]  Due to HIGH CALL volume, it would be easier if you could please send faxed requests to expedite your requests and in part, help us provide discharge notifications faster

## 2017-11-20 NOTE — OCCUPATIONAL THERAPY NOTE
Occupational Therapy  OT tx session cancelled  Pt having ultrasound at attempted tx time  Will continue to follow as available   ISAIAH Hampton

## 2017-11-20 NOTE — PLAN OF CARE
Problem: OCCUPATIONAL THERAPY ADULT  Goal: Performs self-care activities at highest level of function for planned discharge setting  See evaluation for individualized goals  Treatment Interventions: ADL retraining, Functional transfer training, Endurance training, UE strengthening/ROM, Cognitive reorientation, Patient/family training, Equipment evaluation/education, Compensatory technique education          See flowsheet documentation for full assessment, interventions and recommendations  Outcome: Progressing  Limitation: Decreased ADL status, Decreased UE ROM, Decreased UE strength, Decreased Safe judgement during ADL, Decreased cognition, Decreased endurance, Decreased high-level ADLs  Prognosis: Fair  Assessment: Pt was seen for skilled OT with focus on completion of light grooming activity, basic orientation, BUE ROM exercises, bed mobility and review of current plan of care  Pt with improved focus to tasks with upright positioning, long sitting in bed  Reviewed call bell with encouraged pushing teley button to A due to visual deficits  See above levels of A required for all functional tasks  Pt may benefit from further rehab with focus on achieving optimal performance levels with all functional tasks        OT Discharge Recommendation: Short Term Rehab         Comments: ISAIAH Medina

## 2017-11-20 NOTE — PHYSICAL THERAPY NOTE
Physical Therapy Cancellation Note    Pt currently having bedside US performed  Will follow up as appropriate       Milvia Miroslava, PT

## 2017-11-20 NOTE — PROGRESS NOTES
[facility] Internal Medicine Progress Note  Patient: Jt Pérez 80 y o  female   MRN: [id number]  PCP: Katelyn Dinh DO  Unit/Bed#: E4 -01 Encounter: [id number]  Date Of Visit: 11/20/17    Assessment:    Principal Problem:    Acute CHF (congestive heart failure) ([facility] )  Active Problems:    Hypertension    CHF (congestive heart failure) ([facility] )    Alzheimer disease    Urinary retention      Plan:    · Acute CHF probably diastolic component but possibly in relation to more acute symptoms of right middle and lower lobe pneumonia will continue present course of ceftriaxone and and Zithromax will check results of 2D echocardiogram done over weekend appears euvolemic and Cardiology is discontinue diuretics  · Right lower lobe pneumonia continue ceftriaxone Zithromax leukocytosis is trended down and approaching normalization remains afebrile consider oral equivalent in a m  · Essential hypertension on no meds  · Alzheimers dementia on combination Namenda and Aricept more confused since admission possibly as result of environmental issues and her inflammatory process will ask Dee -service to see also will need rehab as has significant ambulatory dysfunction and multiple falls with multiple rib fractures of late  · Ambulatory dysfunction with multiple fractures including to the manubrium coracoid process right rib fractures some which are subacute others unable to determine T2 vertebral fracture all this points for need for rehab as the patient is at significant further risk from present living arrangement      VTE Pharmacologic Prophylaxis:   Pharmacologic: Heparin  Mechanical VTE Prophylaxis in Place: Yes    Discussions with Specialists or Other Care Team Provider:  No    Time Spent for Care: 30 minutes  More than 50% of total time spent on counseling and coordination of care as described above        Subjective:   Poor historian mobile Ng into uninterpretable and tangential thoughts in no acute distress however no respiratory distress now off O2 without apparent dyspnea  Objective:     Vitals:   Temp (24hrs), Av 7 °F (37 1 °C), Min:97 6 °F (36 4 °C), Max:99 7 °F (37 6 °C)    HR:  [] 95  Resp:  [18] 18  BP: (102-161)/(61-86) 161/86  SpO2:  [90 %-93 %] 90 %  Body mass index is 22 74 kg/m²  Input and Output Summary (last 24 hours): Intake/Output Summary (Last 24 hours) at 17 1216  Last data filed at 17 0900   Gross per 24 hour   Intake              720 ml   Output             1702 ml   Net             -982 ml       Physical Exam:     Physical Exam:   General appearance: alert, appears stated age, cooperative and distracted  Head: Normocephalic, without obvious abnormality, atraumatic  Lungs: rhonchi RLL  Heart: regular rate and rhythm  Abdomen: soft, non-tender; bowel sounds normal; no masses,  no organomegaly  Back: negative  Extremities: extremities normal, atraumatic, no cyanosis or edema  Neurologic: Grossly normal      Additional Data:     Labs:      Results from last 7 days  Lab Units 17  0538   WBC Thousand/uL 11 78*   HEMOGLOBIN g/dL 10 2*   HEMATOCRIT % 32 6*   PLATELETS Thousands/uL 358   NEUTROS PCT % 80*   LYMPHS PCT % 12*   MONOS PCT % 7   EOS PCT % 1       Results from last 7 days  Lab Units 17  0538  17  1429   SODIUM mmol/L 136  < > 138   POTASSIUM mmol/L 4 2  < > 4 0   CHLORIDE mmol/L 103  < > 103   CO2 mmol/L 25  < > 28   BUN mg/dL 21  < > 23   CREATININE mg/dL 0 68  < > 0 79   CALCIUM mg/dL 8 3  < > 8 4   TOTAL PROTEIN g/dL  --   --  6 6   BILIRUBIN TOTAL mg/dL  --   --  0 31   ALK PHOS U/L  --   --  109   ALT U/L  --   --  24   AST U/L  --   --  36   GLUCOSE RANDOM mg/dL 96  < > 102   < > = values in this interval not displayed  Results from last 7 days  Lab Units 17  1429   INR  1 16       * I Have Reviewed All Lab Data Listed Above  * Additional Pertinent Lab Tests Reviewed:  [facility] Admission Reviewed    Imaging:  Xr Chest 2 Views    Result Date: 11/17/2017  Narrative: CHEST INDICATION:  Cough, weakness  History taken directly from the electronic ordering system  COMPARISON:  None VIEWS:  Frontal and lateral projections IMAGES:  2 FINDINGS:     Normal cardiac silhouette  Increased interstitial and vascular congestion  Patchy infiltrates right mid to lower lung field  Increased soft tissue density also noted in the left infrahilar region  Probable small effusions  No pneumothorax  Visualized osseous structures appear within normal limits for the patient's age  Impression: Increased interstitial and vascular congestion  Patchy infiltrates right mid to lower lung field  Increased soft tissue density also noted in the left infrahilar region  Probable small effusions  Consider CT evaluation  Workstation performed: [id number]     Ct Head Without Contrast    Result Date: 11/17/2017  Narrative: CT BRAIN - WITHOUT CONTRAST INDICATION:  Fall, weakness  COMPARISON:  None  TECHNIQUE:  CT examination of the brain was performed  In addition to axial images, coronal reformatted images were created and submitted for interpretation  Radiation dose length product (DLP) for this visit:  1048 mGy-cm   This examination, like all CT scans performed in the [facility], was performed utilizing techniques to minimize radiation dose exposure, including the use of iterative reconstruction and automated exposure control  IMAGE QUALITY:  Diagnostic  FINDINGS:  PARENCHYMA:  Decreased attenuation is noted in the supratentorial white matter demonstrating an appearance most consistent with mild microangiopathic change  No intracranial mass, mass effect or midline shift  No CT signs of acute infarction  There is no parenchymal hemorrhage  VENTRICLES AND EXTRA-AXIAL SPACES:  Normal for patient's age  VISUALIZED ORBITS AND PARANASAL SINUSES:  Unremarkable   CALVARIUM AND EXTRACRANIAL SOFT TISSUES: Normal      Impression: No acute intracranial abnormality  Workstation performed: [id number]     Ct Chest W Contrast    Result Date: 11/18/2017  Narrative: CT CHEST WITH IV CONTRAST INDICATION:  Follow-up right lung opacity on chest x-ray COMPARISON: Chest x-ray from 11/17/2017 TECHNIQUE: CT examination of the chest was performed  Reformatted images were created in axial, sagittal, and coronal planes  Radiation dose length product (DLP) for this visit:  206 mGy-cm   This examination, like all CT scans performed in the [facility], was performed utilizing techniques to minimize radiation dose exposure, including the use of iterative reconstruction and automated exposure control  IV Contrast:  85 mL of iohexol (OMNIPAQUE)     FINDINGS: LUNGS:  There are emphysematous changes at the lung apex  There is patchy airspace opacity and consolidation throughout the right lung, particularly in the right middle and lower lobes  Infrahilar opacity on x-ray corresponds to this airspace disease  There is mild dependent atelectasis in the left lower lobe  There is a 2 mm left lower lobe lung nodule on series 601, image 55, unrelated to the airspace disease  PLEURA:  Unremarkable  HEART/GREAT VESSELS:  Unremarkable for patient's age  There is an aberrant right subclavian artery  MEDIASTINUM AND FOZIA:  Unremarkable  CHEST WALL AND LOWER NECK:       Normal  VISUALIZED STRUCTURES IN THE UPPER ABDOMEN:  Unremarkable  OSSEOUS STRUCTURES:  There is an age-indeterminate fracture of the right coracoid process  While there is no evidence of healing, multiple healing right rib fractures are also identified  There is a displaced fracture of the manubrium and distal sternum as well as a fracture of the T2 vertebral body  Correlation for recent trauma recommended  Impression: 1  Right-sided airspace opacity with middle and lower lobe consolidation, concerning for pneumonia   This corresponds to the infrahilar opacity seen on chest x-ray  2   Multiple fractures including the manubrium, sternum, coracoid process of the right shoulder, T2 vertebral body, and right ribs  Correlation for recent trauma is recommended  Note that the right rib fractures demonstrate evidence of healing and are subacute while additional fractures are age-indeterminate  3   Centrilobular emphysema with 2 mm left lower lobe nodule  Based on current Fleischner Society 2017 Guidelines on incidental pulmonary nodule, because the patient is considered high risk for lung cancer, 12 month follow-up non-contrast chest CT is recommended  Considerations related to the patient's age and/or comorbidities may be used to alter these recommendations  Workstation performed: [id number]     Imaging Reports Reviewed Today Include:  Reviewed chest x-rays CT imaging  Imaging Personally Reviewed by Myself Includes:    Procedure: Xr Chest 2 Views    Result Date: 11/17/2017  Narrative: CHEST INDICATION:  Cough, weakness  History taken directly from the electronic ordering system  COMPARISON:  None VIEWS:  Frontal and lateral projections IMAGES:  2 FINDINGS:     Normal cardiac silhouette  Increased interstitial and vascular congestion  Patchy infiltrates right mid to lower lung field  Increased soft tissue density also noted in the left infrahilar region  Probable small effusions  No pneumothorax  Visualized osseous structures appear within normal limits for the patient's age  Impression: Increased interstitial and vascular congestion  Patchy infiltrates right mid to lower lung field  Increased soft tissue density also noted in the left infrahilar region  Probable small effusions  Consider CT evaluation  Workstation performed: [id number]     Procedure: Ct Head Without Contrast    Result Date: 11/17/2017  Narrative: CT BRAIN - WITHOUT CONTRAST INDICATION:  Fall, weakness  COMPARISON:  None  TECHNIQUE:  CT examination of the brain was performed    In addition to axial images, coronal reformatted images were created and submitted for interpretation  Radiation dose length product (DLP) for this visit:  1048 mGy-cm   This examination, like all CT scans performed in the [facility], was performed utilizing techniques to minimize radiation dose exposure, including the use of iterative reconstruction and automated exposure control  IMAGE QUALITY:  Diagnostic  FINDINGS:  PARENCHYMA:  Decreased attenuation is noted in the supratentorial white matter demonstrating an appearance most consistent with mild microangiopathic change  No intracranial mass, mass effect or midline shift  No CT signs of acute infarction  There is no parenchymal hemorrhage  VENTRICLES AND EXTRA-AXIAL SPACES:  Normal for patient's age  VISUALIZED ORBITS AND PARANASAL SINUSES:  Unremarkable  CALVARIUM AND EXTRACRANIAL SOFT TISSUES:   Normal      Impression: No acute intracranial abnormality  Workstation performed: [id number]     Procedure: Ct Chest W Contrast    Result Date: 11/18/2017  Narrative: CT CHEST WITH IV CONTRAST INDICATION:  Follow-up right lung opacity on chest x-ray COMPARISON: Chest x-ray from 11/17/2017 TECHNIQUE: CT examination of the chest was performed  Reformatted images were created in axial, sagittal, and coronal planes  Radiation dose length product (DLP) for this visit:  206 mGy-cm   This examination, like all CT scans performed in the [facility], was performed utilizing techniques to minimize radiation dose exposure, including the use of iterative reconstruction and automated exposure control  IV Contrast:  85 mL of iohexol (OMNIPAQUE)     FINDINGS: LUNGS:  There are emphysematous changes at the lung apex  There is patchy airspace opacity and consolidation throughout the right lung, particularly in the right middle and lower lobes  Infrahilar opacity on x-ray corresponds to this airspace disease   There is mild dependent atelectasis in the left lower lobe  There is a 2 mm left lower lobe lung nodule on series 601, image 55, unrelated to the airspace disease  PLEURA:  Unremarkable  HEART/GREAT VESSELS:  Unremarkable for patient's age  There is an aberrant right subclavian artery  MEDIASTINUM AND FOZIA:  Unremarkable  CHEST WALL AND LOWER NECK:       Normal  VISUALIZED STRUCTURES IN THE UPPER ABDOMEN:  Unremarkable  OSSEOUS STRUCTURES:  There is an age-indeterminate fracture of the right coracoid process  While there is no evidence of healing, multiple healing right rib fractures are also identified  There is a displaced fracture of the manubrium and distal sternum as well as a fracture of the T2 vertebral body  Correlation for recent trauma recommended  Impression: 1  Right-sided airspace opacity with middle and lower lobe consolidation, concerning for pneumonia  This corresponds to the infrahilar opacity seen on chest x-ray  2   Multiple fractures including the manubrium, sternum, coracoid process of the right shoulder, T2 vertebral body, and right ribs  Correlation for recent trauma is recommended  Note that the right rib fractures demonstrate evidence of healing and are subacute while additional fractures are age-indeterminate  3   Centrilobular emphysema with 2 mm left lower lobe nodule  Based on current Fleischner Society 2017 Guidelines on incidental pulmonary nodule, because the patient is considered high risk for lung cancer, 12 month follow-up non-contrast chest CT is recommended  Considerations related to the patient's age and/or comorbidities may be used to alter these recommendations  Workstation performed: [id number]        Recent Cultures (last 7 days):       Results from last 7 days  Lab Units 11/17/17  1731 11/17/17  1429   BLOOD CULTURE  No Growth at 48 hrs  No Growth at 48 hrs         Last 24 Hours Medication List:     azithromycin 500 mg Intravenous Q24H   cefTRIAXone 1,000 mg Intravenous Q24H donepezil 10 mg Oral HS   heparin (porcine) 5,000 Units Subcutaneous Q8H [address]   memantine 10 mg Oral HS   potassium chloride 20 mEq Oral Daily With Breakfast        Today, Patient Was Seen By: Miriam Hurst MD    ** Please Note: Dragon 360 Dictation voice to text software may have been used in the creation of this document   **

## 2017-11-20 NOTE — PLAN OF CARE

## 2017-11-20 NOTE — WOUND OSTOMY CARE
Progress Note - Wound   Juan Winchester 80 y o  female MRN: [id number]  Unit/Bed#: E4 -01 Encounter: [id number]      Assessment:   80year old female presented to the hospital from home with her family and caregivers with increasing weakness, decreased urine output, and somnolence  proBNP on admission was 3606 with elevated WBC count  Patient being treated for acute CHF  Patient has a history of HTN and Alzheimer's disease  Patient is incontinent of urine and bowel  During repositioning patient is rigid/tense  When in bed, patient leans (favors) to her left side  Findings:  1  Present on admission stage 2 pressure injury of right buttock  2   Present on admission stage 2 pressure injury of left buttock  Bilateral buttocks are erythematous and blanchable around open areas with some macerated areas  Bilateral heels and skin folds are WNL  Plan:   1  Turn and reposition patient every 2 hours  2   Elevate heels off of bed on pillows to offload  3   Moisturize skin daily with nourishing lotion  4   Apply Hydraguard lotion to b/l heels BID & PRN  5   Dust open areas to b/l buttocks with stomahesive powder, cover with calazime paste TID & PRN  6   Sofcare cushion when OOB to chair  7   Wound care team to follow weekly  Plan of care reviewed with primary RNSondra  Objective:    Vitals: Blood pressure 161/86, pulse 95, temperature 99 7 °F (37 6 °C), temperature source Temporal, resp  rate 18, height 5' 4" (1 626 m), weight 60 1 kg (132 lb 7 9 oz), SpO2 90 %  ,Body mass index is 22 74 kg/m²      Pressure Ulcer 11/17/17 Buttocks Left (Active)   Staging Stage II 11/20/2017 12:00 PM   Wound Description Clean;Beefy red;Fragile 11/20/2017 12:00 PM   Debi-wound Assessment Erythema 11/20/2017 12:00 PM   Shape 0 5 11/20/2017 12:00 PM   Wound Length (cm) 0 5 cm 11/20/2017 12:00 PM   Wound Width (cm) 0 1 cm 11/20/2017 12:00 PM   Calculated Wound Area (cm^2) 0 05 cm^2 11/20/2017 12:00 PM Drainage Amount Scant 11/20/2017 12:00 PM   Drainage Description Bloody 11/20/2017 12:00 PM   Treatment Cleansed; Offload;Softcare cushion;Turn & reposition 11/20/2017 12:00 PM   Dressing Protective barrier 11/20/2017 12:00 PM   Patient Tolerance Tolerated well 11/20/2017 12:00 PM   Dressing Status Open to air 11/19/2017  8:03 AM       Pressure Ulcer 11/17/17 Buttocks Right (Active)   Staging Stage II 11/20/2017 12:00 PM   Wound Description Beefy red;Clean 11/20/2017 12:00 PM   Debi-wound Assessment Erythema 11/20/2017 12:00 PM   Shape round 11/19/2017  8:03 AM   Wound Length (cm) 0 7 cm 11/20/2017 12:00 PM   Wound Width (cm) 1 5 cm 11/20/2017 12:00 PM   Wound Depth (cm) 0 1 11/20/2017 12:00 PM   Calculated Wound Area (cm^2) 1 05 cm^2 11/20/2017 12:00 PM   Calculated Wound Volume (cm^3) 0 1 cm^3 11/20/2017 12:00 PM   Drainage Amount Small 11/20/2017 12:00 PM   Drainage Description Bloody 11/20/2017 12:00 PM   Treatment Turn & reposition; Softcare cushion;Offload;Cleansed 11/20/2017 12:00 PM   Dressing Protective barrier 11/20/2017 12:00 PM   Patient Tolerance Tolerated well 11/20/2017 12:00 PM   Dressing Status Open to air 11/19/2017  8:03 AM     Please contact the wound care team at extension [phone] with any questions    Modesta Ramey BSN, RN, CCRN

## 2017-11-20 NOTE — OCCUPATIONAL THERAPY NOTE
Occupational Therapy Treatment Note:         11/20/17 1240   Restrictions/Precautions   Weight Bearing Precautions Per Order No   Other Precautions Fall Risk; Chair Alarm; Bed Alarm;Cognitive   Pain Assessment   Pain Assessment No/denies pain   Pain Score No Pain   Pain Location Hand   Pain Orientation Bilateral   Diversional Activities Television   ADL   Where Assessed Supine, bed  (HOB elevated  )   Grooming Assistance 3  Moderate Assistance   Grooming Deficit Setup;Verbal cueing;Supervision/safety; Increased time to complete;Wash/dry face; Wash/dry hands   Grooming Comments cues to initiate and sequence task required  LB Dressing Assistance 2  Maximal Assistance   LB Dressing Deficit Setup;Verbal cueing;Supervision/safety; Increased time to complete; Don/doff R sock; Don/doff L sock   LB Dressing Comments Pt with limited focus to tasks, increased confusion/poor vision  Toileting Comments Pt incontinent of bladder this tx session  Bed Mobility   Rolling R 3  Moderate assistance   Additional items Assist x 1; Increased time required;Verbal cues;LE management   Rolling L 2  Maximal assistance   Additional items Assist x 2; Increased time required;Verbal cues   Supine to Sit 3  Moderate assistance   Additional items Assist x 1;Bedrails; Increased time required;Verbal cues;LE management   Sit to Supine 3  Moderate assistance   Additional items Assist x 1; Increased time required;Verbal cues;LE management; Bedrails   Additional Comments cues for safety required with hand over hand A  Pt able to achieve F sitting balance once stabilized at EOB  Transfers   Sit to Stand Unable to assess   Cognition   Overall Cognitive Status Impaired   Arousal/Participation Responsive   Attention Difficulty attending to directions   Orientation Level Disoriented to place; Disoriented to time;Disoriented to situation   Memory Decreased short term memory;Decreased recall of precautions;Decreased recall of recent events   Following Commands Follows one step commands with increased time or repetition   Comments Pt able to follow 1 and 2 step commands with hand over hand initiation provided  Cognition Assessment Tools Other (Comment)  (functional observation  )   Additional Activities   Additional Activities Other (Comment)  (reviewed plan of care with Pt and brother  )   Additional Activities Comments Pt with limited communication this tx session  Off topic conversation noted  Activity Tolerance   Activity Tolerance Patient tolerated treatment well   Medical Staff Made Aware Reported all findings to nursing staff  Assessment   Assessment Pt was seen for skilled OT with focus on completion of light grooming activity, basic orientation, BUE ROM exercises, bed mobility and review of current plan of care  Pt with improved focus to tasks with upright positioning, long sitting in bed  Reviewed call bell with encouraged pushing teley button to A due to visual deficits  See above levels of A required for all functional tasks  Pt may benefit from further rehab with focus on achieving optimal performance levels with all functional tasks  Plan   Treatment Interventions ADL retraining;Functional transfer training;UE strengthening/ROM; Endurance training;Cognitive reorientation   Goal Expiration Date 11/28/17   Treatment Day 1   OT Frequency 3-5x/wk   Recommendation   OT Discharge Recommendation Short Term Rehab   ISAIAH Garcia

## 2017-11-21 LAB
ATRIAL RATE: 79 BPM
P AXIS: 49 DEGREES
PR INTERVAL: 138 MS
QRS AXIS: -44 DEGREES
QRSD INTERVAL: 92 MS
QT INTERVAL: 352 MS
QTC INTERVAL: 403 MS
T WAVE AXIS: 53 DEGREES
T4 FREE SERPL-MCNC: 1.26 NG/DL (ref 0.76–1.46)
TSH SERPL DL<=0.05 MIU/L-ACNC: 3.79 UIU/ML (ref 0.36–3.74)
VENTRICULAR RATE: 79 BPM
VIT B12 SERPL-MCNC: 303 PG/ML (ref 100–900)

## 2017-11-21 PROCEDURE — 84443 ASSAY THYROID STIM HORMONE: CPT | Performed by: NURSE PRACTITIONER

## 2017-11-21 PROCEDURE — 82306 VITAMIN D 25 HYDROXY: CPT | Performed by: NURSE PRACTITIONER

## 2017-11-21 PROCEDURE — 84439 ASSAY OF FREE THYROXINE: CPT | Performed by: NURSE PRACTITIONER

## 2017-11-21 PROCEDURE — 97530 THERAPEUTIC ACTIVITIES: CPT | Performed by: PHYSICAL THERAPIST

## 2017-11-21 PROCEDURE — 82607 VITAMIN B-12: CPT | Performed by: NURSE PRACTITIONER

## 2017-11-21 RX ORDER — CEFUROXIME AXETIL 250 MG/1
500 TABLET ORAL EVERY 12 HOURS SCHEDULED
Status: DISCONTINUED | OUTPATIENT
Start: 2017-11-21 | End: 2017-11-23

## 2017-11-21 RX ORDER — ACETAMINOPHEN 325 MG/1
975 TABLET ORAL EVERY 8 HOURS SCHEDULED
Status: DISCONTINUED | OUTPATIENT
Start: 2017-11-21 | End: 2017-11-28 | Stop reason: HOSPADM

## 2017-11-21 RX ORDER — LIDOCAINE 50 MG/G
1 PATCH TOPICAL DAILY
Status: DISCONTINUED | OUTPATIENT
Start: 2017-11-21 | End: 2017-11-28 | Stop reason: HOSPADM

## 2017-11-21 RX ORDER — AMOXICILLIN 250 MG
1 CAPSULE ORAL 2 TIMES DAILY
Status: DISCONTINUED | OUTPATIENT
Start: 2017-11-21 | End: 2017-11-23

## 2017-11-21 RX ORDER — AZITHROMYCIN 250 MG/1
500 TABLET, FILM COATED ORAL EVERY 24 HOURS
Status: DISCONTINUED | OUTPATIENT
Start: 2017-11-21 | End: 2017-11-21

## 2017-11-21 RX ORDER — POLYETHYLENE GLYCOL 3350 17 G/17G
17 POWDER, FOR SOLUTION ORAL DAILY PRN
Status: DISCONTINUED | OUTPATIENT
Start: 2017-11-21 | End: 2017-11-28 | Stop reason: HOSPADM

## 2017-11-21 RX ADMIN — HEPARIN SODIUM 5000 UNITS: 5000 INJECTION, SOLUTION INTRAVENOUS; SUBCUTANEOUS at 05:21

## 2017-11-21 RX ADMIN — Medication 1 TABLET: at 18:07

## 2017-11-21 RX ADMIN — LIDOCAINE 1 PATCH: 50 PATCH CUTANEOUS at 11:00

## 2017-11-21 RX ADMIN — CEFUROXIME AXETIL 500 MG: 250 TABLET ORAL at 18:05

## 2017-11-21 RX ADMIN — ACETAMINOPHEN 975 MG: 325 TABLET ORAL at 11:06

## 2017-11-21 RX ADMIN — HEPARIN SODIUM 5000 UNITS: 5000 INJECTION, SOLUTION INTRAVENOUS; SUBCUTANEOUS at 21:24

## 2017-11-21 RX ADMIN — MEMANTINE 10 MG: 5 TABLET ORAL at 21:24

## 2017-11-21 RX ADMIN — POTASSIUM CHLORIDE 20 MEQ: 1500 TABLET, EXTENDED RELEASE ORAL at 07:57

## 2017-11-21 RX ADMIN — HEPARIN SODIUM 5000 UNITS: 5000 INJECTION, SOLUTION INTRAVENOUS; SUBCUTANEOUS at 14:28

## 2017-11-21 RX ADMIN — ACETAMINOPHEN 975 MG: 325 TABLET ORAL at 18:07

## 2017-11-21 RX ADMIN — Medication 1 TABLET: at 11:01

## 2017-11-21 RX ADMIN — DONEPEZIL HYDROCHLORIDE 10 MG: 5 TABLET, FILM COATED ORAL at 21:24

## 2017-11-21 NOTE — PHYSICAL THERAPY NOTE
Physical Therapy Progress Note     11/21/17 1329   Pain Assessment   Pain Rating: FLACC (Rest) - Face 0   Pain Rating: FLACC (Rest) - Legs 0   Pain Rating: FLACC (Rest) - Activity 0   Pain Rating: FLACC (Rest) - Cry 1   Pain Rating: FLACC (Rest) - Consolability 0   Score: FLACC (Rest) 1   Restrictions/Precautions   Other Precautions Bed Alarm;Cognitive; Fall Risk   General   Chart Reviewed Yes   Response to Previous Treatment Patient unable to report, no changes reported from family or staff   Family/Caregiver Present Yes   Cognition   Overall Cognitive Status Impaired   Orientation Level Oriented to person   Comments pt sleeping  will open eyes briefly, resisting attempts at mobilization   Subjective   Subjective none offered, family reports tp will often be awake all night, impulsively get OOB and walk  then will sleep until the afternoon the next day  Bed Mobility   Rolling R 2  Maximal assistance   Additional items Assist x 1; Increased time required;Verbal cues;LE management   Rolling L 2  Maximal assistance   Additional items Assist x 1; Increased time required;Verbal cues;LE management   Supine to Sit 2  Maximal assistance   Additional items Assist x 1; Increased time required;Verbal cues;LE management   Sit to Supine 2  Maximal assistance   Additional items Assist x 1; Increased time required;Verbal cues;LE management   Transfers   Sit to Stand Unable to assess   Balance   Static Sitting Fair -   Dynamic Sitting Poor -   Endurance Deficit   Endurance Deficit Yes   Endurance Deficit Description lethargy   Activity Tolerance   Activity Tolerance Treatment limited secondary to agitation;Treatment limited secondary to medical complications (Comment)  (lethargy)   Assessment   Prognosis Fair   Problem List Decreased strength;Decreased endurance; Impaired balance;Decreased mobility; Decreased cognition; Impaired judgement;Decreased safety awareness   Assessment pt  very lethargic today but has been sleeping for some time per nursing  attempted to waken pt  pt would open eyes for a few seconds, than close them again, did not follow directions for any task  pt was assisted to sitting, no change in alterness noted  pt attempting to lay back down  tried to direct pt to family who was in room but pt continued to resist  pt did sit on edge of bed for about 15 minutes  pad sling behind back for support  attempted to get pt engaged in ther ex and cognitive tasks for ireintation but pt would not participate  pt was returned to supine in bed, nursing notified  family reports she has done this in the past at home, sleeping most of the day after being up all night and also sleep walking, mostly in the last 6 months pt will need continued skilled PT and rehab  Barriers to Discharge Decreased caregiver support  (pt needs max assist at present)   Goals   Patient Goals none offered   STG Expiration Date 11/25/17   Plan   Treatment/Interventions Functional transfer training;Cognitive reorientation;LE strengthening/ROM; Therapeutic exercise; Endurance training;Equipment eval/education; Bed mobility;Gait training;Spoke to nursing;Spoke to case management   PT Frequency 5x/wk   Recommendation   Recommendation Post acute IP rehab   PT - OK to Discharge Yes  (to rehab)     Lian Jones, PT

## 2017-11-21 NOTE — CONSULTS
Consultation - Geriatric Medicine   Jori Katz 80 y o  female MRN: [id number]  Unit/Bed#: E4 -01 Encounter: [id number]      Assessment/Plan     Assessment/Plan:  1  Acute metabolic encephalopathy-  Multifactorial in the setting of falls, pain secondary to rib fractures, infection, change in environment and underlying dementia  First line treatment is reassure, reorient, redirect  Include family and companionship, avoid restraints if possible  If medications required for patient safety and behaviors that are severe and not redirectable, use lowest dose for shortest period of time  Identify and treat reversible causes of confusion such as pain, urinary retention, constipation, infection  Treat pain first before using antipsychotics  May use Zyprexa 2 5 mg q 8 hrs PRN if pt becomes agitated or combative and reorientation/redirection efforts failed  Unable to perform [facility] assessment at present time secondary to patients present limited cognition  Recommend follow up with [facility] for comprehensive cognitive evaluation and support  At this time would recommend consideration for increased care related to mci and amb dys  Recommend OT complete ACLs for home safety if patient is to [place] home  Will Check TSH, T4, Vitamin B12       2  Fall -  Fall precautions  Consider risks vs benefits of coumadin in this patient   PT/OT  follow recommendations     3  Ambulatory dysfunction -pt uses a walker, cane and wheelchair while at home  Continue with pt/ot - rec inpt therapy, agreed  Recommend calcium, continue vitamin D     4  Pain control-  ATC scheduled acetaminophen 975mg po implemented- amongst the older adult population, patients may not be able to communicate or sense pain and may present as agitation or delirium  For more severe pain please consider geriatric pain protocol  Please ensure adequate bowel regimine is in place       5  Constipation- No recorded BM since admission, please monitor  Will order Sennakot BID with prn Miralax, may need to increase senna to 2 po BID or miralax daily if not effective  6  Debility - continue with supportive care, would need rehab s/p hospitalization,  family agreeable to rehab  Mobilize pt  as tolerated to avoid further deconditioning, encourage use of incentive spirometer  Optimize nutritional status, albumin 2 0, recommend Ensure BID  Check TSH       7  Urinary incontinence - monitor pt, report wears a depends for stress incontince,  recommend timed voiding and keep pt clean and dry to avoid skin breakdown     8  Visual impairment -- at baseline visually impaired, wears glasses, ensure patient has readily available     9  Hypoalbuminemia - albumin 2 0 continue to monitor, patient may benefit from nutrition consult to optimize nutrition  10  Goals of Care-   Spoke with brother Louis Ambrocio, he states goal would be to have patient go to STR prior to returning to home  Caregiver comes in 2x weeks to help bath patient  Would like patient to return to care in the home  Patient and family would benefit further work up with [facility] to assist with comprehensive evaluation and support services for patient and caregivers  History of Present Illness   Physician Requesting Consult: Phill Rowe MD  Reason for Consult / Principal Problem:   Hx and PE limited by: cognitive impairment  HPI: Richard Michel is a 80y o  year old female  With a history of dementia; alzheimer's, HF, and HTN, she presented  to the ED via EMS secondary to family and caregiver concern that she was having increased in weakness, somnolence and decrease urine output  EMS noted decrease in oxygen saturations, that improved with o2 at 2 lpm and a duoneb treatment  Upon ED arrival Sao2 was 91, BP was 101/52 and proBNP 3606  CXR with patchy infiltrate in the right mid to lower lung, and vascular congestion  Urine straight cath was positive for nitrates   She was started on admitted for HF and started on ceftriaxone and Zithromax for pneumonia  She was also noted to have more frequent falls at home and has multiple rib fractures, and a T2 vertebral fracture  Inpatient consult to Gerontology  Consult performed by: Debbie Randle  Consult ordered by: Sarah Mejia          Review of Systems   Unable to perform ROS: Mental status change       Geriatric Conditions: dementia, cognitive impairment    Historical Information   Past Medical History:   Diagnosis Date    Alzheimer disease     CHF [facility]congestive heart failure) ([facility] [address] )     Hypertension      History reviewed  No pertinent surgical history  Social History   History   Alcohol Use No     History   Drug Use No     History   Smoking Status    Current Every Day Smoker    Packs/day: 1 00   Smokeless Tobacco    Not on file     Family History: non-contributory    Meds/Allergies   all current active meds have been reviewed    No Known Allergies    Objective     Intake/Output Summary (Last 24 hours) at 11/21/17 0901  Last data filed at 11/21/17 0521   Gross per 24 hour   Intake               60 ml   Output              786 ml   Net             -726 ml     Invasive Devices     Peripheral Intravenous Line            Peripheral IV 11/20/17 Left Forearm less than 1 day                Physical Exam   Constitutional: She appears well-developed and well-nourished  No distress  Cardiovascular: Normal rate  Exam reveals no gallop and no friction rub  No murmur heard  Pulmonary/Chest: No respiratory distress  She has decreased breath sounds  She has no wheezes  She has no rales  Abdominal: Soft  Normal appearance  Bowel sounds are decreased  There is no tenderness  Skin: She is not diaphoretic  Nursing note and vitals reviewed        Lab Results:   Lab Results   Component Value Date    WBC 11 78 (H) 11/20/2017    RBC 3 73 (L) 11/20/2017    HGB 10 2 (L) 11/20/2017    HCT 32 6 (L) 11/20/2017    MCV 87 11/20/2017  11/20/2017     No results found for: CRQHVLBT89  No results found for: O1GBXDB, FREET4  No components found for: VITAMIND1, 25-DIHYDROXY  Lab Results   Component Value Date     11/20/2017    K 4 2 11/20/2017     11/20/2017    CO2 25 11/20/2017    ANIONGAP 8 11/20/2017    BUN 21 11/20/2017    CREATININE 0 68 11/20/2017    GLUCOSE 96 11/20/2017    CALCIUM 8 3 11/20/2017    AST 36 11/17/2017    ALT 24 11/17/2017    ALKPHOS 109 11/17/2017    PROT 6 6 11/17/2017    ALBUMIN 2 0 (L) 11/17/2017    BILITOT 0 31 11/17/2017    EGFR 80 11/20/2017     Lab Results   Component Value Date    COLORU Gail 11/17/2017    CLARITYU Clear 11/17/2017    SPECGRAV 1 020 11/17/2017    PHUR 5 5 11/17/2017    LEUKOCYTESUR Trace (A) 11/17/2017    NITRITE Negative 11/17/2017    PROTEINUA 100 (2+) (A) 11/17/2017    GLUCOSEU Negative 11/17/2017    KETONESU Negative 11/17/2017    BILIRUBINUR Interference- unable to analyze (A) 11/17/2017    BLOODU Negative 11/17/2017     Lab Results   Component Value Date    ALBUMIN 2 0 (L) 11/17/2017     Lab Results   Component Value Date    INR 1 16 11/17/2017     Lab Results   Component Value Date    BLOODCX No Growth at 72 hrs  11/17/2017     No results found for: [facility]    Imaging Studies:   CT: In the parenchyma, decreased attenuation is noted in the supratentorial white matter demonstrating an appearance most consistent with mild microangiopathic change  EKG, Pathology, and Other Studies:   EKG:       Therapies:   PT/OT: recommend in patient rehab        Code Status: Level 3 - DNAR and DNI  Advance Directive and Living Will:      Power of :    POLST:      Family and Social Support: lives with brother  No Data Recorded    Goals of Care: to return to home with caregivers after STRd    Counseling / Coordination of Care  Total floor / unit time spent today 35 minutes   Greater than 50% of total time was spent with the patient and / or family counseling and / or coordination of care   A description of the counseling / coordination of care: geriatric assessment

## 2017-11-21 NOTE — PROGRESS NOTES
Patient becoming combative and aggressive, non-compliant with turning and repositioning q2hrs night shift  Continue to encourage  Call bell within reach  Will continue to monitor

## 2017-11-21 NOTE — PROGRESS NOTES
The azithromycin has / have been converted to Oral per [facility] IV-to-PO Auto-Conversion Protocol for Adults as approved by the Pharmacy and Therapeutics Committee  The patient met all eligible criteria:  3 Age = 25years old   2) Received at least one dose of the IV form   3) Receiving at least one other scheduled oral/enteral medication   4) Tolerating an oral/enteral diet   and did not have any exclusions:   1) Critical care patient   2) Active GI bleed (IF assessing H2RAs or PPIs)   3) Continuous tube feeding (IF assessing cipro, doxycycline, levofloxacin, minocycline, rifampin, or voriconazole)   4) Receiving PO vancomycin (IF assessing metronidazole)   5) Persistent nausea and/or vomiting   6) Ileus or gastrointestinal obstruction   7) Cheryle/nasogastric tube set for continuous suction   8) Specific order not to automatically convert to PO (in the order's comments or if discussed in the most recent Infectious Disease or primary team's progress notes)

## 2017-11-21 NOTE — PLAN OF CARE
Problem: PHYSICAL THERAPY ADULT  Goal: Performs mobility at highest level of function for planned discharge setting  See evaluation for individualized goals  Treatment/Interventions: Functional transfer training, LE strengthening/ROM, Therapeutic exercise, Endurance training, Cognitive reorientation, Patient/family training, Equipment eval/education, Bed mobility, Gait training, Spoke to nursing, OT          See flowsheet documentation for full assessment, interventions and recommendations  Outcome: Not Progressing  Prognosis: Fair  Problem List: Decreased strength, Decreased endurance, Impaired balance, Decreased mobility, Decreased cognition, Impaired judgement, Decreased safety awareness  Assessment: pt  very lethargic today but has been sleeping for some time per nursing  attempted to waken pt  pt would open eyes for a few seconds, than close them again, did not follow directions for any task  pt was assisted to sitting, no change in alterness noted  pt attempting to lay back down  tried to direct pt to family who was in room but pt continued to resist  pt did sit on edge of bed for about 15 minutes  pad sling behind back for support  attempted to get pt engaged in ther ex and cognitive tasks for ireintation but pt would not participate  pt was returned to supine in bed, nursing notified  family reports she has done this in the past at home, sleeping most of the day after being up all night and also sleep walking, mostly in the last 6 months pt will need continued skilled PT and rehab  Barriers to Discharge: Decreased caregiver support (pt needs max assist at present)     Recommendation: Post acute IP rehab     PT - OK to Discharge: Yes (to rehab)    See flowsheet documentation for full assessment

## 2017-11-21 NOTE — PROGRESS NOTES
[facility] Progress Note  Patient: Hardy Mancia 80 y o  female   MRN: [id number]  PCP: Chioma Schwab DO  Unit/Bed#: E4 -01 Encounter: [id number]  Date Of Visit: 11/21/17    Assessment:    Principal Problem:    Acute CHF (congestive heart failure) ([facility] )  Active Problems:    Hypertension    CHF (congestive heart failure) ([facility] )    Alzheimer disease    Urinary retention      Plan:    · Acute CHF probably diastolic component but possibly in relation to more acute symptoms of right middle and lower lobe pneumonia will set 3 day course of Zithromax ceftriaxone with resolution of leukocytosis will change to p o  Ceftin today  results of 2D echocardiogram done over weekend noted 55% EF with no regional wall motion abnormalities and wall thickness upper limits with mildly dilated atrium appears euvolemic and Cardiology is discontinue diuretics  · Right lower lobe pneumonia continue ceftriaxone Zithromax leukocytosis is trended down and approaching normalization remains afebrile will change to oral antibiotics today  · Essential hypertension on no meds  · Alzheimers dementia on combination Namenda and Aricept more confused since admission possibly as result of environmental issues and her inflammatory process w [place] -service saw and suggestions noted awaiting TSH B12 levels also will need rehab as has significant ambulatory dysfunction and multiple falls with multiple rib fractures of late  · Ambulatory dysfunction with multiple fractures including to the manubrium coracoid process right rib fractures some which are subacute others unable to determine T2 vertebral fracture all this points for need for rehab as the patient is at significant further risk from present living arrangement      VTE Pharmacologic Prophylaxis:   Pharmacologic: Heparin  Mechanical VTE Prophylaxis in Place: Yes    Discussions with Specialists or Other Care Team Provider:  No    Time Spent for Care: 30 minutes    More than 50% of total time spent on counseling and coordination of care as described above  Subjective:   Poor historian mobile Ng into uninterpretable and tangential thoughts in no acute distress however no respiratory distress now off O2 without apparent dyspnea  Objective:     Vitals:   Temp (24hrs), Av 9 °F (37 2 °C), Min:98 5 °F (36 9 °C), Max:99 5 °F (37 5 °C)    HR:  [79-91] 79  Resp:  [18-19] 18  BP: (136-168)/(73-82) 151/82  SpO2:  [91 %-95 %] 95 %  Body mass index is 22 89 kg/m²  Input and Output Summary (last 24 hours): Intake/Output Summary (Last 24 hours) at 17 1012  Last data filed at 17 1271   Gross per 24 hour   Intake              240 ml   Output              786 ml   Net             -546 ml       Physical Exam:     Physical Exam:   General appearance: alert, appears stated age, cooperative and distracted  Head: Normocephalic, without obvious abnormality, atraumatic  Lungs: rhonchi RLL  Heart: regular rate and rhythm  Abdomen: soft, non-tender; bowel sounds normal; no masses,  no organomegaly  Back: negative  Extremities: extremities normal, atraumatic, no cyanosis or edema  Neurologic: Grossly normal      Additional Data:     Labs:      Results from last 7 days  Lab Units 17  0538   WBC Thousand/uL 11 78*   HEMOGLOBIN g/dL 10 2*   HEMATOCRIT % 32 6*   PLATELETS Thousands/uL 358   NEUTROS PCT % 80*   LYMPHS PCT % 12*   MONOS PCT % 7   EOS PCT % 1       Results from last 7 days  Lab Units 17  0538  17  1429   SODIUM mmol/L 136  < > 138   POTASSIUM mmol/L 4 2  < > 4 0   CHLORIDE mmol/L 103  < > 103   CO2 mmol/L 25  < > 28   BUN mg/dL 21  < > 23   CREATININE mg/dL 0 68  < > 0 79   CALCIUM mg/dL 8 3  < > 8 4   TOTAL PROTEIN g/dL  --   --  6 6   BILIRUBIN TOTAL mg/dL  --   --  0 31   ALK PHOS U/L  --   --  109   ALT U/L  --   --  24   AST U/L  --   --  36   GLUCOSE RANDOM mg/dL 96  < > 102   < > = values in this interval not displayed      Results from last 7 days  Lab Units 11/17/17  1429   INR  1 16       * I Have Reviewed All Lab Data Listed Above  * Additional Pertinent Lab Tests Reviewed: All Labs For Current Hospital Admission Reviewed    Imaging:  Xr Chest 2 Views    Result Date: 11/17/2017  Narrative: CHEST INDICATION:  Cough, weakness  History taken directly from the electronic ordering system  COMPARISON:  None VIEWS:  Frontal and lateral projections IMAGES:  2 FINDINGS:     Normal cardiac silhouette  Increased interstitial and vascular congestion  Patchy infiltrates right mid to lower lung field  Increased soft tissue density also noted in the left infrahilar region  Probable small effusions  No pneumothorax  Visualized osseous structures appear within normal limits for the patient's age  Impression: Increased interstitial and vascular congestion  Patchy infiltrates right mid to lower lung field  Increased soft tissue density also noted in the left infrahilar region  Probable small effusions  Consider CT evaluation  Workstation performed: [id number]     Ct Head Without Contrast    Result Date: 11/17/2017  Narrative: CT BRAIN - WITHOUT CONTRAST INDICATION:  Fall, weakness  COMPARISON:  None  TECHNIQUE:  CT examination of the brain was performed  In addition to axial images, coronal reformatted images were created and submitted for interpretation  Radiation dose length product (DLP) for this visit:  1048 mGy-cm   This examination, like all CT scans performed in the [facility], was performed utilizing techniques to minimize radiation dose exposure, including the use of iterative reconstruction and automated exposure control  IMAGE QUALITY:  Diagnostic  FINDINGS:  PARENCHYMA:  Decreased attenuation is noted in the supratentorial white matter demonstrating an appearance most consistent with mild microangiopathic change  No intracranial mass, mass effect or midline shift  No CT signs of acute infarction    There is no parenchymal hemorrhage  VENTRICLES AND EXTRA-AXIAL SPACES:  Normal for patient's age  VISUALIZED ORBITS AND PARANASAL SINUSES:  Unremarkable  CALVARIUM AND EXTRACRANIAL SOFT TISSUES:   Normal      Impression: No acute intracranial abnormality  Workstation performed: [id number]     Ct Chest W Contrast    Result Date: 11/18/2017  Narrative: CT CHEST WITH IV CONTRAST INDICATION:  Follow-up right lung opacity on chest x-ray COMPARISON: Chest x-ray from 11/17/2017 TECHNIQUE: CT examination of the chest was performed  Reformatted images were created in axial, sagittal, and coronal planes  Radiation dose length product (DLP) for this visit:  206 mGy-cm   This examination, like all CT scans performed in the [facility], was performed utilizing techniques to minimize radiation dose exposure, including the use of iterative reconstruction and automated exposure control  IV Contrast:  85 mL of iohexol (OMNIPAQUE)     FINDINGS: LUNGS:  There are emphysematous changes at the lung apex  There is patchy airspace opacity and consolidation throughout the right lung, particularly in the right middle and lower lobes  Infrahilar opacity on x-ray corresponds to this airspace disease  There is mild dependent atelectasis in the left lower lobe  There is a 2 mm left lower lobe lung nodule on series 601, image 55, unrelated to the airspace disease  PLEURA:  Unremarkable  HEART/GREAT VESSELS:  Unremarkable for patient's age  There is an aberrant right subclavian artery  MEDIASTINUM AND FOZIA:  Unremarkable  CHEST WALL AND LOWER NECK:       Normal  VISUALIZED STRUCTURES IN THE UPPER ABDOMEN:  Unremarkable  OSSEOUS STRUCTURES:  There is an age-indeterminate fracture of the right coracoid process  While there is no evidence of healing, multiple healing right rib fractures are also identified  There is a displaced fracture of the manubrium and distal sternum as well as a fracture of the T2 vertebral body   Correlation for recent trauma recommended  Impression: 1  Right-sided airspace opacity with middle and lower lobe consolidation, concerning for pneumonia  This corresponds to the infrahilar opacity seen on chest x-ray  2   Multiple fractures including the manubrium, sternum, coracoid process of the right shoulder, T2 vertebral body, and right ribs  Correlation for recent trauma is recommended  Note that the right rib fractures demonstrate evidence of healing and are subacute while additional fractures are age-indeterminate  3   Centrilobular emphysema with 2 mm left lower lobe nodule  Based on current Fleischner Society 2017 Guidelines on incidental pulmonary nodule, because the patient is considered high risk for lung cancer, 12 month follow-up non-contrast chest CT is recommended  Considerations related to the patient's age and/or comorbidities may be used to alter these recommendations  Workstation performed: [id number]     Imaging Reports Reviewed Today Include:  Reviewed chest x-rays CT imaging  Imaging Personally Reviewed by Myself Includes:    Procedure: Xr Chest 2 Views    Result Date: 11/17/2017  Narrative: CHEST INDICATION:  Cough, weakness  History taken directly from the electronic ordering system  COMPARISON:  None VIEWS:  Frontal and lateral projections IMAGES:  2 FINDINGS:     Normal cardiac silhouette  Increased interstitial and vascular congestion  Patchy infiltrates right mid to lower lung field  Increased soft tissue density also noted in the left infrahilar region  Probable small effusions  No pneumothorax  Visualized osseous structures appear within normal limits for the patient's age  Impression: Increased interstitial and vascular congestion  Patchy infiltrates right mid to lower lung field  Increased soft tissue density also noted in the left infrahilar region  Probable small effusions  Consider CT evaluation   Workstation performed: [id number]     Procedure: Ct Head Without Contrast    Result Date: 11/17/2017  Narrative: CT BRAIN - WITHOUT CONTRAST INDICATION:  Fall, weakness  COMPARISON:  None  TECHNIQUE:  CT examination of the brain was performed  In addition to axial images, coronal reformatted images were created and submitted for interpretation  Radiation dose length product (DLP) for this visit:  1048 mGy-cm   This examination, like all CT scans performed in the [facility], was performed utilizing techniques to minimize radiation dose exposure, including the use of iterative reconstruction and automated exposure control  IMAGE QUALITY:  Diagnostic  FINDINGS:  PARENCHYMA:  Decreased attenuation is noted in the supratentorial white matter demonstrating an appearance most consistent with mild microangiopathic change  No intracranial mass, mass effect or midline shift  No CT signs of acute infarction  There is no parenchymal hemorrhage  VENTRICLES AND EXTRA-AXIAL SPACES:  Normal for patient's age  VISUALIZED ORBITS AND PARANASAL SINUSES:  Unremarkable  CALVARIUM AND EXTRACRANIAL SOFT TISSUES:   Normal      Impression: No acute intracranial abnormality  Workstation performed: [id number]     Procedure: Ct Chest W Contrast    Result Date: 11/18/2017  Narrative: CT CHEST WITH IV CONTRAST INDICATION:  Follow-up right lung opacity on chest x-ray COMPARISON: Chest x-ray from 11/17/2017 TECHNIQUE: CT examination of the chest was performed  Reformatted images were created in axial, sagittal, and coronal planes  Radiation dose length product (DLP) for this visit:  206 mGy-cm   This examination, like all CT scans performed in the [facility], was performed utilizing techniques to minimize radiation dose exposure, including the use of iterative reconstruction and automated exposure control  IV Contrast:  85 mL of iohexol (OMNIPAQUE)     FINDINGS: LUNGS:  There are emphysematous changes at the lung apex   There is patchy airspace opacity and consolidation throughout the right lung, particularly in the right middle and lower lobes  Infrahilar opacity on x-ray corresponds to this airspace disease  There is mild dependent atelectasis in the left lower lobe  There is a 2 mm left lower lobe lung nodule on series 601, image 55, unrelated to the airspace disease  PLEURA:  Unremarkable  HEART/GREAT VESSELS:  Unremarkable for patient's age  There is an aberrant right subclavian artery  MEDIASTINUM AND FOZIA:  Unremarkable  CHEST WALL AND LOWER NECK:       Normal  VISUALIZED STRUCTURES IN THE UPPER ABDOMEN:  Unremarkable  OSSEOUS STRUCTURES:  There is an age-indeterminate fracture of the right coracoid process  While there is no evidence of healing, multiple healing right rib fractures are also identified  There is a displaced fracture of the manubrium and distal sternum as well as a fracture of the T2 vertebral body  Correlation for recent trauma recommended  Impression: 1  Right-sided airspace opacity with middle and lower lobe consolidation, concerning for pneumonia  This corresponds to the infrahilar opacity seen on chest x-ray  2   Multiple fractures including the manubrium, sternum, coracoid process of the right shoulder, T2 vertebral body, and right ribs  Correlation for recent trauma is recommended  Note that the right rib fractures demonstrate evidence of healing and are subacute while additional fractures are age-indeterminate  3   Centrilobular emphysema with 2 mm left lower lobe nodule  Based on current Fleischner Society 2017 Guidelines on incidental pulmonary nodule, because the patient is considered high risk for lung cancer, 12 month follow-up non-contrast chest CT is recommended  Considerations related to the patient's age and/or comorbidities may be used to alter these recommendations       Workstation performed: [id number]        Recent Cultures (last 7 days):       Results from last 7 days  Lab Units 11/17/17  1731 11/17/17  1429 BLOOD CULTURE  No Growth at 72 hrs  No Growth at 72 hrs  Last 24 Hours Medication List:     acetaminophen 975 mg Oral Q8H [address]   cefTRIAXone 1,000 mg Intravenous Q24H   donepezil 10 mg Oral HS   heparin (porcine) 5,000 Units Subcutaneous Q8H [address]   lidocaine 1 patch Transdermal Daily   memantine 10 mg Oral HS   potassium chloride 20 mEq Oral Daily With Breakfast   senna-docusate sodium 1 tablet Oral BID        Today, Patient Was Seen By: Fredy Gilmore MD    ** Please Note: Dragon 360 Dictation voice to text software may have been used in the creation of this document   **

## 2017-11-22 LAB
BACTERIA BLD CULT: NORMAL
BACTERIA BLD CULT: NORMAL
BASOPHILS # BLD AUTO: 0.04 THOUSANDS/ΜL (ref 0–0.1)
BASOPHILS NFR BLD AUTO: 0 % (ref 0–1)
EOSINOPHIL # BLD AUTO: 0.24 THOUSAND/ΜL (ref 0–0.61)
EOSINOPHIL NFR BLD AUTO: 2 % (ref 0–6)
ERYTHROCYTE [DISTWIDTH] IN BLOOD BY AUTOMATED COUNT: 15.6 % (ref 11.6–15.1)
HCT VFR BLD AUTO: 33.4 % (ref 34.8–46.1)
HGB BLD-MCNC: 10.4 G/DL (ref 11.5–15.4)
LYMPHOCYTES # BLD AUTO: 1.79 THOUSANDS/ΜL (ref 0.6–4.47)
LYMPHOCYTES NFR BLD AUTO: 13 % (ref 14–44)
MCH RBC QN AUTO: 27.5 PG (ref 26.8–34.3)
MCHC RBC AUTO-ENTMCNC: 31.1 G/DL (ref 31.4–37.4)
MCV RBC AUTO: 88 FL (ref 82–98)
MONOCYTES # BLD AUTO: 0.7 THOUSAND/ΜL (ref 0.17–1.22)
MONOCYTES NFR BLD AUTO: 5 % (ref 4–12)
NEUTROPHILS # BLD AUTO: 10.55 THOUSANDS/ΜL (ref 1.85–7.62)
NEUTS SEG NFR BLD AUTO: 80 % (ref 43–75)
NRBC BLD AUTO-RTO: 0 /100 WBCS
PLATELET # BLD AUTO: 397 THOUSANDS/UL (ref 149–390)
PMV BLD AUTO: 9.5 FL (ref 8.9–12.7)
RBC # BLD AUTO: 3.78 MILLION/UL (ref 3.81–5.12)
WBC # BLD AUTO: 13.32 THOUSAND/UL (ref 4.31–10.16)

## 2017-11-22 PROCEDURE — 85025 COMPLETE CBC W/AUTO DIFF WBC: CPT | Performed by: INTERNAL MEDICINE

## 2017-11-22 RX ADMIN — LIDOCAINE 1 PATCH: 50 PATCH CUTANEOUS at 08:30

## 2017-11-22 RX ADMIN — DONEPEZIL HYDROCHLORIDE 10 MG: 5 TABLET, FILM COATED ORAL at 21:38

## 2017-11-22 RX ADMIN — ACETAMINOPHEN 975 MG: 325 TABLET ORAL at 18:08

## 2017-11-22 RX ADMIN — CEFUROXIME AXETIL 500 MG: 250 TABLET ORAL at 18:08

## 2017-11-22 RX ADMIN — MEMANTINE 10 MG: 5 TABLET ORAL at 21:38

## 2017-11-22 RX ADMIN — HEPARIN SODIUM 5000 UNITS: 5000 INJECTION, SOLUTION INTRAVENOUS; SUBCUTANEOUS at 05:34

## 2017-11-22 RX ADMIN — POTASSIUM CHLORIDE 20 MEQ: 1500 TABLET, EXTENDED RELEASE ORAL at 08:30

## 2017-11-22 RX ADMIN — CEFUROXIME AXETIL 500 MG: 250 TABLET ORAL at 05:34

## 2017-11-22 RX ADMIN — ACETAMINOPHEN 975 MG: 325 TABLET ORAL at 11:51

## 2017-11-22 RX ADMIN — HEPARIN SODIUM 5000 UNITS: 5000 INJECTION, SOLUTION INTRAVENOUS; SUBCUTANEOUS at 21:38

## 2017-11-22 RX ADMIN — Medication 1 TABLET: at 08:30

## 2017-11-22 RX ADMIN — HEPARIN SODIUM 5000 UNITS: 5000 INJECTION, SOLUTION INTRAVENOUS; SUBCUTANEOUS at 11:51

## 2017-11-22 RX ADMIN — Medication 1 TABLET: at 18:08

## 2017-11-22 NOTE — PROGRESS NOTES
[facility] Internal Medicine Progress Note  Patient: Paula Carias 80 y o  female   MRN: [id number]  PCP: Chandrakant Botello DO  Unit/Bed#: E4 -01 Encounter: [id number]  Date Of Visit: 11/22/17    Assessment:    Principal Problem:    Acute CHF (congestive heart failure) ([facility] [address] )  Active Problems:    Hypertension    CHF (congestive heart failure) ([facility] )    Alzheimer disease    Urinary retention      Plan:    · Acute CHF probably diastolic component but possibly in relation to more acute symptoms of right middle and lower lobe pneumonia will set 3 day course of Zithromax ceftriaxone with resolution of leukocytosis will change to p o  Ceftin today  results of 2D echocardiogram done over weekend noted 55% EF with no regional wall motion abnormalities and wall thickness upper limits with mildly dilated atrium appears euvolemic and Cardiology is discontinue diuretics  · Right lower lobe pneumonia continue ceftriaxone Zithromax leukocytosis is trended down and approaching normalization remains afebrile  change to oral antibiotics yesterday    · Essential hypertension on no meds  · Alzheimers dementia on combination Namenda and Aricept more confused since admission possibly as result of environmental issues and her inflammatory process [facility] [place] -service saw and suggestions noted awaiting TSH B12 levels also will need rehab as has significant ambulatory dysfunction and multiple falls with multiple rib fractures of late  · Ambulatory dysfunction with multiple fractures including to the manubrium coracoid process right rib fractures some which are subacute others unable to determine T2 vertebral fracture all this points for need for rehab as the patient is at significant further risk from present living arrangement awaiting disposition to short-term rehab      VTE Pharmacologic Prophylaxis:   Pharmacologic: Heparin  Mechanical VTE Prophylaxis in Place: Yes    Discussions with Specialists or Other Care Team Provider: No    Time Spent for Care: 30 minutes  More than 50% of total time spent on counseling and coordination of care as described above  Subjective:   Poor historian mobile Ng into uninterpretable and tangential thoughts in no acute distress however no respiratory distress now off O2 without apparent dyspnea  Objective:     Vitals:   Temp (24hrs), Av 9 °F (37 2 °C), Min:98 6 °F (37 °C), Max:99 5 °F (37 5 °C)    HR:  [71-79] 76  Resp:  [18] 18  BP: (103-131)/(48-71) 121/71  SpO2:  [91 %-93 %] 92 %  Body mass index is 21 12 kg/m²  Input and Output Summary (last 24 hours):        Intake/Output Summary (Last 24 hours) at 17 4394  Last data filed at 17 1826   Gross per 24 hour   Intake              180 ml   Output              510 ml   Net             -330 ml       Physical Exam:     Physical Exam:   General appearance: alert, appears stated age, cooperative and distracted  Head: Normocephalic, without obvious abnormality, atraumatic  Lungs: rhonchi RLL  Heart: regular rate and rhythm  Abdomen: soft, non-tender; bowel sounds normal; no masses,  no organomegaly  Back: negative  Extremities: extremities normal, atraumatic, no cyanosis or edema  Neurologic: Grossly normal      Additional Data:     Labs:      Results from last 7 days  Lab Units 17  0642   WBC Thousand/uL 13 32*   HEMOGLOBIN g/dL 10 4*   HEMATOCRIT % 33 4*   PLATELETS Thousands/uL 397*   NEUTROS PCT % 80*   LYMPHS PCT % 13*   MONOS PCT % 5   EOS PCT % 2       Results from last 7 days  Lab Units 17  0538  17  1429   SODIUM mmol/L 136  < > 138   POTASSIUM mmol/L 4 2  < > 4 0   CHLORIDE mmol/L 103  < > 103   CO2 mmol/L 25  < > 28   BUN mg/dL 21  < > 23   CREATININE mg/dL 0 68  < > 0 79   CALCIUM mg/dL 8 3  < > 8 4   TOTAL PROTEIN g/dL  --   --  6 6   BILIRUBIN TOTAL mg/dL  --   --  0 31   ALK PHOS U/L  --   --  109   ALT U/L  --   --  24   AST U/L  --   --  36   GLUCOSE RANDOM mg/dL 96  < > 102   < > = values in this interval not displayed  Results from last 7 days  Lab Units 11/17/17  1429   INR  1 16       * I Have Reviewed All Lab Data Listed Above  * Additional Pertinent Lab Tests Reviewed: All Labs For Current Hospital Admission Reviewed    Imaging:  Xr Chest 2 Views    Result Date: 11/17/2017  Narrative: CHEST INDICATION:  Cough, weakness  History taken directly from the electronic ordering system  COMPARISON:  None VIEWS:  Frontal and lateral projections IMAGES:  2 FINDINGS:     Normal cardiac silhouette  Increased interstitial and vascular congestion  Patchy infiltrates right mid to lower lung field  Increased soft tissue density also noted in the left infrahilar region  Probable small effusions  No pneumothorax  Visualized osseous structures appear within normal limits for the patient's age  Impression: Increased interstitial and vascular congestion  Patchy infiltrates right mid to lower lung field  Increased soft tissue density also noted in the left infrahilar region  Probable small effusions  Consider CT evaluation  Workstation performed: [id number]     Ct Head Without Contrast    Result Date: 11/17/2017  Narrative: CT BRAIN - WITHOUT CONTRAST INDICATION:  Fall, weakness  COMPARISON:  None  TECHNIQUE:  CT examination of the brain was performed  In addition to axial images, coronal reformatted images were created and submitted for interpretation  Radiation dose length product (DLP) for this visit:  1048 mGy-cm   This examination, like all CT scans performed in the [facility], was performed utilizing techniques to minimize radiation dose exposure, including the use of iterative reconstruction and automated exposure control  IMAGE QUALITY:  Diagnostic  FINDINGS:  PARENCHYMA:  Decreased attenuation is noted in the supratentorial white matter demonstrating an appearance most consistent with mild microangiopathic change  No intracranial mass, mass effect or midline shift    No CT signs of acute infarction  There is no parenchymal hemorrhage  VENTRICLES AND EXTRA-AXIAL SPACES:  Normal for patient's age  VISUALIZED ORBITS AND PARANASAL SINUSES:  Unremarkable  CALVARIUM AND EXTRACRANIAL SOFT TISSUES:   Normal      Impression: No acute intracranial abnormality  Workstation performed: [id number]     Ct Chest W Contrast    Result Date: 11/18/2017  Narrative: CT CHEST WITH IV CONTRAST INDICATION:  Follow-up right lung opacity on chest x-ray COMPARISON: Chest x-ray from 11/17/2017 TECHNIQUE: CT examination of the chest was performed  Reformatted images were created in axial, sagittal, and coronal planes  Radiation dose length product (DLP) for this visit:  206 mGy-cm   This examination, like all CT scans performed in the [facility], was performed utilizing techniques to minimize radiation dose exposure, including the use of iterative reconstruction and automated exposure control  IV Contrast:  85 mL of iohexol (OMNIPAQUE)     FINDINGS: LUNGS:  There are emphysematous changes at the lung apex  There is patchy airspace opacity and consolidation throughout the right lung, particularly in the right middle and lower lobes  Infrahilar opacity on x-ray corresponds to this airspace disease  There is mild dependent atelectasis in the left lower lobe  There is a 2 mm left lower lobe lung nodule on series 601, image 55, unrelated to the airspace disease  PLEURA:  Unremarkable  HEART/GREAT VESSELS:  Unremarkable for patient's age  There is an aberrant right subclavian artery  MEDIASTINUM AND FOZIA:  Unremarkable  CHEST WALL AND LOWER NECK:       Normal  VISUALIZED STRUCTURES IN THE UPPER ABDOMEN:  Unremarkable  OSSEOUS STRUCTURES:  There is an age-indeterminate fracture of the right coracoid process  While there is no evidence of healing, multiple healing right rib fractures are also identified   There is a displaced fracture of the manubrium and distal sternum as well as a fracture of the T2 vertebral body  Correlation for recent trauma recommended  Impression: 1  Right-sided airspace opacity with middle and lower lobe consolidation, concerning for pneumonia  This corresponds to the infrahilar opacity seen on chest x-ray  2   Multiple fractures including the manubrium, sternum, coracoid process of the right shoulder, T2 vertebral body, and right ribs  Correlation for recent trauma is recommended  Note that the right rib fractures demonstrate evidence of healing and are subacute while additional fractures are age-indeterminate  3   Centrilobular emphysema with 2 mm left lower lobe nodule  Based on current Fleischner Society 2017 Guidelines on incidental pulmonary nodule, because the patient is considered high risk for lung cancer, 12 month follow-up non-contrast chest CT is recommended  Considerations related to the patient's age and/or comorbidities may be used to alter these recommendations  Workstation performed: [id number]     Imaging Reports Reviewed Today Include:  Reviewed chest x-rays CT imaging  Imaging Personally Reviewed by Myself Includes:    Procedure: Xr Chest 2 Views    Result Date: 11/17/2017  Narrative: CHEST INDICATION:  Cough, weakness  History taken directly from the electronic ordering system  COMPARISON:  None VIEWS:  Frontal and lateral projections IMAGES:  2 FINDINGS:     Normal cardiac silhouette  Increased interstitial and vascular congestion  Patchy infiltrates right mid to lower lung field  Increased soft tissue density also noted in the left infrahilar region  Probable small effusions  No pneumothorax  Visualized osseous structures appear within normal limits for the patient's age  Impression: Increased interstitial and vascular congestion  Patchy infiltrates right mid to lower lung field  Increased soft tissue density also noted in the left infrahilar region  Probable small effusions  Consider CT evaluation   Workstation performed: [id number]     Procedure: Ct Head Without Contrast    Result Date: 11/17/2017  Narrative: CT BRAIN - WITHOUT CONTRAST INDICATION:  Fall, weakness  COMPARISON:  None  TECHNIQUE:  CT examination of the brain was performed  In addition to axial images, coronal reformatted images were created and submitted for interpretation  Radiation dose length product (DLP) for this visit:  1048 mGy-cm   This examination, like all CT scans performed in the [facility], was performed utilizing techniques to minimize radiation dose exposure, including the use of iterative reconstruction and automated exposure control  IMAGE QUALITY:  Diagnostic  FINDINGS:  PARENCHYMA:  Decreased attenuation is noted in the supratentorial white matter demonstrating an appearance most consistent with mild microangiopathic change  No intracranial mass, mass effect or midline shift  No CT signs of acute infarction  There is no parenchymal hemorrhage  VENTRICLES AND EXTRA-AXIAL SPACES:  Normal for patient's age  VISUALIZED ORBITS AND PARANASAL SINUSES:  Unremarkable  CALVARIUM AND EXTRACRANIAL SOFT TISSUES:   Normal      Impression: No acute intracranial abnormality  Workstation performed: [id number]     Procedure: Ct Chest W Contrast    Result Date: 11/18/2017  Narrative: CT CHEST WITH IV CONTRAST INDICATION:  Follow-up right lung opacity on chest x-ray COMPARISON: Chest x-ray from 11/17/2017 TECHNIQUE: CT examination of the chest was performed  Reformatted images were created in axial, sagittal, and coronal planes  Radiation dose length product (DLP) for this visit:  206 mGy-cm   This examination, like all CT scans performed in the [facility], was performed utilizing techniques to minimize radiation dose exposure, including the use of iterative reconstruction and automated exposure control  IV Contrast:  85 mL of iohexol (OMNIPAQUE)     FINDINGS: LUNGS:  There are emphysematous changes at the lung apex  There is patchy airspace opacity and consolidation throughout the right lung, particularly in the right middle and lower lobes  Infrahilar opacity on x-ray corresponds to this airspace disease  There is mild dependent atelectasis in the left lower lobe  There is a 2 mm left lower lobe lung nodule on series 601, image 55, unrelated to the airspace disease  PLEURA:  Unremarkable  HEART/GREAT VESSELS:  Unremarkable for patient's age  There is an aberrant right subclavian artery  MEDIASTINUM AND FOZIA:  Unremarkable  CHEST WALL AND LOWER NECK:       Normal  VISUALIZED STRUCTURES IN THE UPPER ABDOMEN:  Unremarkable  OSSEOUS STRUCTURES:  There is an age-indeterminate fracture of the right coracoid process  While there is no evidence of healing, multiple healing right rib fractures are also identified  There is a displaced fracture of the manubrium and distal sternum as well as a fracture of the T2 vertebral body  Correlation for recent trauma recommended  Impression: 1  Right-sided airspace opacity with middle and lower lobe consolidation, concerning for pneumonia  This corresponds to the infrahilar opacity seen on chest x-ray  2   Multiple fractures including the manubrium, sternum, coracoid process of the right shoulder, T2 vertebral body, and right ribs  Correlation for recent trauma is recommended  Note that the right rib fractures demonstrate evidence of healing and are subacute while additional fractures are age-indeterminate  3   Centrilobular emphysema with 2 mm left lower lobe nodule  Based on current Fleischner Society 2017 Guidelines on incidental pulmonary nodule, because the patient is considered high risk for lung cancer, 12 month follow-up non-contrast chest CT is recommended  Considerations related to the patient's age and/or comorbidities may be used to alter these recommendations       Workstation performed: [id number]        Recent Cultures (last 7 days):       Results from last 7 days  Lab Units 11/17/17  1731 11/17/17  1429   BLOOD CULTURE  No Growth After 4 Days  No Growth After 4 Days  Last 24 Hours Medication List:     acetaminophen 975 mg Oral Q8H [address]   cefuroxime 500 mg Oral Q12H [address]   donepezil 10 mg Oral HS   heparin (porcine) 5,000 Units Subcutaneous Q8H [address]   lidocaine 1 patch Transdermal Daily   memantine 10 mg Oral HS   potassium chloride 20 mEq Oral Daily With Breakfast   senna-docusate sodium 1 tablet Oral BID        Today, Patient Was Seen By: Lety Mathis MD    ** Please Note: Dragon 360 Dictation voice to text software may have been used in the creation of this document   **

## 2017-11-22 NOTE — PROGRESS NOTES
Progress Note - Prachi Vaz 80 y o  female MRN: [id number]    Unit/Bed#: E4 -01 Encounter: [id number]      Assessment/Plan:  80year old female with:    1  Toxic metabolic encephalopathy/delirium- multifactorial, in the setting of underlying cognitive impairment, fall/trauma/pain, pneumonia, CHF, constipation, sensory impairment  Improved today  Delirium precautions as previously outlined per advanced practitioner  Continued supportive care and management of acute and chronic medical conditions per primary team  Treatment of constipation and pain as below  Avoid deliriogenic medications including tramadol, benadryl, benzodiazepines, sleep aids such as ambien  2  Dementia- moderate, likely Alzheimer vs mixed type per history  Continue supportive care  TSH mildly elevated, free T4 WNL; consider repeat TFTs in 6-8 weeks outpatient  B12 low normal, no macrocytosis on CBC, consider supplementation vs checking MMA level to rule out deficiency  Recommend outpatient follow up at the [facility] for comprehensive geriatric assessment  Continue aricept and namenda, consideration for outpatient taper of these medications  3  Ambulatory dysfunction and fall- PT/OT, fall precautions  Agree with recommendation for rehab at discharge  Agree with scheduled acetaminophen and lidoderm patch for pain control in the setting of rib fractures, patient may not reliably related pain in setting of #2; see geriatric pain order set  4  Constipation- improved, last bowel movement recorded on 11/20; continue senna-docusate, consider PRN miralax if not bowel movement after 2-3 days  5  Deconditioning- Multifactorial, PT/OT, supportive care, nutritional support; agree with rehab at discharge    6  Visual impairment- with history of glaucoma; continue supportive care; frequent verbal reorientation and reassurance    7  Medication review- medications reviewed with recommendations as outlined       Spoke to family friend, Pranay Mares, at bedside; all questions answered  Spoke with nursing at bedside  Will next assess patient on Friday, 11/24/17  Subjective:   Patient seen and examined for geriatric follow up  Started on scheduled acetaminophen and lidoderm patch yesterday in setting of rib fractures  Patient is more alert today, friend Pranay Mares at bedside, was discussing politics/watching [employer] with her earlier today  Good appetite and PO intake  PT/OT recommending rehab  SOB and cough improving, denies fever or chills  Denies pain at present  All other ROS negative  Objective:     Vitals: Blood pressure 121/71, pulse 76, temperature 98 7 °F (37 1 °C), temperature source Temporal, resp  rate 18, height 5' 4" (1 626 m), weight 55 8 kg (123 lb 0 3 oz), SpO2 92 %  ,Body mass index is 21 12 kg/m²  Intake/Output Summary (Last 24 hours) at 11/22/17 1241  Last data filed at 11/21/17 1826   Gross per 24 hour   Intake              180 ml   Output               55 ml   Net              125 ml       Physical Exam:   Awake and alert  Seated upright in bed  NAD  Pleasant, cooperative  NC/AT, MMM, oropharynx clear, neck supple +hirsutism  RRR +S1 +S2  Decreased breath sounds b/l bases, otherwise CTA b/l  Abdomen soft NTND +bowel sounds  Trace pitting edema to the knees b/l  Oriented to person, place (hospital)  No gross neuro deficits, answers questions appropriately and follows commands     Invasive Devices     Peripheral Intravenous Line            Peripheral IV 11/20/17 Left Forearm 1 day                Lab, Imaging and other studies: I have personally reviewed pertinent reports

## 2017-11-23 LAB — C DIFF TOX GENS STL QL NAA+PROBE: NORMAL

## 2017-11-23 PROCEDURE — 87493 C DIFF AMPLIFIED PROBE: CPT | Performed by: INTERNAL MEDICINE

## 2017-11-23 RX ADMIN — LIDOCAINE 1 PATCH: 50 PATCH CUTANEOUS at 09:08

## 2017-11-23 RX ADMIN — POTASSIUM CHLORIDE 20 MEQ: 1500 TABLET, EXTENDED RELEASE ORAL at 09:07

## 2017-11-23 RX ADMIN — MEMANTINE 10 MG: 5 TABLET ORAL at 22:21

## 2017-11-23 RX ADMIN — ACETAMINOPHEN 975 MG: 325 TABLET ORAL at 12:00

## 2017-11-23 RX ADMIN — DONEPEZIL HYDROCHLORIDE 10 MG: 5 TABLET, FILM COATED ORAL at 22:20

## 2017-11-23 RX ADMIN — HEPARIN SODIUM 5000 UNITS: 5000 INJECTION, SOLUTION INTRAVENOUS; SUBCUTANEOUS at 22:20

## 2017-11-23 RX ADMIN — HEPARIN SODIUM 5000 UNITS: 5000 INJECTION, SOLUTION INTRAVENOUS; SUBCUTANEOUS at 05:59

## 2017-11-23 RX ADMIN — CEFUROXIME AXETIL 500 MG: 250 TABLET ORAL at 05:59

## 2017-11-23 RX ADMIN — ACETAMINOPHEN 975 MG: 325 TABLET ORAL at 18:31

## 2017-11-23 RX ADMIN — ACETAMINOPHEN 975 MG: 325 TABLET ORAL at 04:45

## 2017-11-23 RX ADMIN — HEPARIN SODIUM 5000 UNITS: 5000 INJECTION, SOLUTION INTRAVENOUS; SUBCUTANEOUS at 14:34

## 2017-11-23 NOTE — PROGRESS NOTES
[facility] Progress Note  Patient: Chloe De Dios 80 y o  female   MRN: [id number]  PCP: Alber Bullock DO  Unit/Bed#: E4 -01 Encounter: [id number]  Date Of Visit: 11/23/17    Assessment:    Principal Problem:    Acute CHF (congestive heart failure) ([facility] )  Active Problems:    Hypertension    CHF (congestive heart failure) ([place] [address] )    Alzheimer disease    Urinary retention      Plan:    · Acute CHF probably diastolic component but possibly in relation to more acute symptoms of right middle and lower lobe pneumonia had 3 day course of Zithromax ceftriaxone with resolution of leukocytosis  changed to p o  Ceftin but will DC today with onset of diarrhea results of 2D echocardiogram done over weekend noted 55% EF with no regional wall motion abnormalities and wall thickness upper limits with mildly dilated atrium appears euvolemic and Cardiology is discontinue diuretics    · Right lower lobe pneumonia continue c leukocytosis is trended down and approaching normalization remains afebrile  changed to oral antibiotics but will be discontinued due to onset of diarrhea  · Essential hypertension on no meds  · Alzheimers dementia on combination Namenda and Aricept more confused since admission possibly as result of environmental issues and her inflammatory process tiarra Graham Simpler -service saw and suggestions noted B12 low normal levels also will need rehab as has significant ambulatory dysfunction and multiple falls with multiple rib fractures of late  · Ambulatory dysfunction with multiple fractures including to the manubrium coracoid process right rib fractures some which are subacute others unable to determine T2 vertebral fracture all this points for need for rehab as the patient is at significant further risk from present living arrangement awaiting disposition to short-term rehab  · Diarrhea possibly induced by recent course of antibiotics will discontinue cephalexin at this point and check for C diff and stop      VTE Pharmacologic Prophylaxis:   Pharmacologic: Heparin  Mechanical VTE Prophylaxis in Place: Yes    Discussions with Specialists or Other Care Team Provider:  No    Time Spent for Care: 30 minutes  More than 50% of total time spent on counseling and coordination of care as described above  Subjective:   Poor historian mobile Ng into uninterpretable and tangential thoughts in no acute distress however no respiratory distress now off O2 without apparent dyspnea  Staff noting onset of diarrhea without reference of abdominal pain  Objective:     Vitals:   Temp (24hrs), Av 7 °F (37 1 °C), Min:97 8 °F (36 6 °C), Max:99 6 °F (37 6 °C)    HR:  [73-84] 76  Resp:  [18-22] 22  BP: (104-125)/(51-83) 125/83  SpO2:  [92 %-93 %] 93 %  Body mass index is 20 85 kg/m²  Input and Output Summary (last 24 hours):        Intake/Output Summary (Last 24 hours) at 17 1002  Last data filed at 17 1801   Gross per 24 hour   Intake              240 ml   Output              194 ml   Net               46 ml       Physical Exam:     Physical Exam:   General appearance: alert, appears stated age, cooperative and distracted  Head: Normocephalic, without obvious abnormality, atraumatic  Lungs: rhonchi RLL  Heart: regular rate and rhythm  Abdomen: soft, non-tender; bowel sounds normal; no masses,  no organomegaly  Back: negative  Extremities: extremities normal, atraumatic, no cyanosis or edema  Neurologic: Grossly normal      Additional Data:     Labs:      Results from last 7 days  Lab Units 17  0642   WBC Thousand/uL 13 32*   HEMOGLOBIN g/dL 10 4*   HEMATOCRIT % 33 4*   PLATELETS Thousands/uL 397*   NEUTROS PCT % 80*   LYMPHS PCT % 13*   MONOS PCT % 5   EOS PCT % 2       Results from last 7 days  Lab Units 17  0538  17  1429   SODIUM mmol/L 136  < > 138   POTASSIUM mmol/L 4 2  < > 4 0   CHLORIDE mmol/L 103  < > 103   CO2 mmol/L 25  < > 28   BUN mg/dL 21  < > 23   CREATININE mg/dL 0 68  < > 0  79   CALCIUM mg/dL 8 3  < > 8 4   TOTAL PROTEIN g/dL  --   --  6 6   BILIRUBIN TOTAL mg/dL  --   --  0 31   ALK PHOS U/L  --   --  109   ALT U/L  --   --  24   AST U/L  --   --  36   GLUCOSE RANDOM mg/dL 96  < > 102   < > = values in this interval not displayed  Results from last 7 days  Lab Units 11/17/17  1429   INR  1 16       * I Have Reviewed All Lab Data Listed Above  * Additional Pertinent Lab Tests Reviewed: All Labs For Current Hospital Admission Reviewed    Imaging:  Xr Chest 2 Views    Result Date: 11/17/2017  Narrative: CHEST INDICATION:  Cough, weakness  History taken directly from the electronic ordering system  COMPARISON:  None VIEWS:  Frontal and lateral projections IMAGES:  2 FINDINGS:     Normal cardiac silhouette  Increased interstitial and vascular congestion  Patchy infiltrates right mid to lower lung field  Increased soft tissue density also noted in the left infrahilar region  Probable small effusions  No pneumothorax  Visualized osseous structures appear within normal limits for the patient's age  Impression: Increased interstitial and vascular congestion  Patchy infiltrates right mid to lower lung field  Increased soft tissue density also noted in the left infrahilar region  Probable small effusions  Consider CT evaluation  Workstation performed: [id number]     Ct Head Without Contrast    Result Date: 11/17/2017  Narrative: CT BRAIN - WITHOUT CONTRAST INDICATION:  Fall, weakness  COMPARISON:  None  TECHNIQUE:  CT examination of the brain was performed  In addition to axial images, coronal reformatted images were created and submitted for interpretation  Radiation dose length product (DLP) for this visit:  1048 mGy-cm   This examination, like all CT scans performed in the [facility], was performed utilizing techniques to minimize radiation dose exposure, including the use of iterative reconstruction and automated exposure control    IMAGE QUALITY: Diagnostic  FINDINGS:  PARENCHYMA:  Decreased attenuation is noted in the supratentorial white matter demonstrating an appearance most consistent with mild microangiopathic change  No intracranial mass, mass effect or midline shift  No CT signs of acute infarction  There is no parenchymal hemorrhage  VENTRICLES AND EXTRA-AXIAL SPACES:  Normal for patient's age  VISUALIZED ORBITS AND PARANASAL SINUSES:  Unremarkable  CALVARIUM AND EXTRACRANIAL SOFT TISSUES:   Normal      Impression: No acute intracranial abnormality  Workstation performed: [id number]     Ct Chest W Contrast    Result Date: 11/18/2017  Narrative: CT CHEST WITH IV CONTRAST INDICATION:  Follow-up right lung opacity on chest x-ray COMPARISON: Chest x-ray from 11/17/2017 TECHNIQUE: CT examination of the chest was performed  Reformatted images were created in axial, sagittal, and coronal planes  Radiation dose length product (DLP) for this visit:  206 mGy-cm   This examination, like all CT scans performed in the [facility], was performed utilizing techniques to minimize radiation dose exposure, including the use of iterative reconstruction and automated exposure control  IV Contrast:  85 mL of iohexol (OMNIPAQUE)     FINDINGS: LUNGS:  There are emphysematous changes at the lung apex  There is patchy airspace opacity and consolidation throughout the right lung, particularly in the right middle and lower lobes  Infrahilar opacity on x-ray corresponds to this airspace disease  There is mild dependent atelectasis in the left lower lobe  There is a 2 mm left lower lobe lung nodule on series 601, image 55, unrelated to the airspace disease  PLEURA:  Unremarkable  HEART/GREAT VESSELS:  Unremarkable for patient's age  There is an aberrant right subclavian artery  MEDIASTINUM AND FOZIA:  Unremarkable  CHEST WALL AND LOWER NECK:       Normal  VISUALIZED STRUCTURES IN THE UPPER ABDOMEN:  Unremarkable   OSSEOUS STRUCTURES:  There is an age-indeterminate fracture of the right coracoid process  While there is no evidence of healing, multiple healing right rib fractures are also identified  There is a displaced fracture of the manubrium and distal sternum as well as a fracture of the T2 vertebral body  Correlation for recent trauma recommended  Impression: 1  Right-sided airspace opacity with middle and lower lobe consolidation, concerning for pneumonia  This corresponds to the infrahilar opacity seen on chest x-ray  2   Multiple fractures including the manubrium, sternum, coracoid process of the right shoulder, T2 vertebral body, and right ribs  Correlation for recent trauma is recommended  Note that the right rib fractures demonstrate evidence of healing and are subacute while additional fractures are age-indeterminate  3   Centrilobular emphysema with 2 mm left lower lobe nodule  Based on current Fleischner Society 2017 Guidelines on incidental pulmonary nodule, because the patient is considered high risk for lung cancer, 12 month follow-up non-contrast chest CT is recommended  Considerations related to the patient's age and/or comorbidities may be used to alter these recommendations  Workstation performed: [id number]     Imaging Reports Reviewed Today Include:  Reviewed chest x-rays CT imaging  Imaging Personally Reviewed by Myself Includes:    Procedure: Xr Chest 2 Views    Result Date: 11/17/2017  Narrative: CHEST INDICATION:  Cough, weakness  History taken directly from the electronic ordering system  COMPARISON:  None VIEWS:  Frontal and lateral projections IMAGES:  2 FINDINGS:     Normal cardiac silhouette  Increased interstitial and vascular congestion  Patchy infiltrates right mid to lower lung field  Increased soft tissue density also noted in the left infrahilar region  Probable small effusions  No pneumothorax  Visualized osseous structures appear within normal limits for the patient's age       Impression: Increased interstitial and vascular congestion  Patchy infiltrates right mid to lower lung field  Increased soft tissue density also noted in the left infrahilar region  Probable small effusions  Consider CT evaluation  Workstation performed: [id number]     Procedure: Ct Head Without Contrast    Result Date: 11/17/2017  Narrative: CT BRAIN - WITHOUT CONTRAST INDICATION:  Fall, weakness  COMPARISON:  None  TECHNIQUE:  CT examination of the brain was performed  In addition to axial images, coronal reformatted images were created and submitted for interpretation  Radiation dose length product (DLP) for this visit:  1048 mGy-cm   This examination, like all CT scans performed in the [facility], was performed utilizing techniques to minimize radiation dose exposure, including the use of iterative reconstruction and automated exposure control  IMAGE QUALITY:  Diagnostic  FINDINGS:  PARENCHYMA:  Decreased attenuation is noted in the supratentorial white matter demonstrating an appearance most consistent with mild microangiopathic change  No intracranial mass, mass effect or midline shift  No CT signs of acute infarction  There is no parenchymal hemorrhage  VENTRICLES AND EXTRA-AXIAL SPACES:  Normal for patient's age  VISUALIZED ORBITS AND PARANASAL SINUSES:  Unremarkable  CALVARIUM AND EXTRACRANIAL SOFT TISSUES:   Normal      Impression: No acute intracranial abnormality  Workstation performed: [id number]     Procedure: Ct Chest W Contrast    Result Date: 11/18/2017  Narrative: CT CHEST WITH IV CONTRAST INDICATION:  Follow-up right lung opacity on chest x-ray COMPARISON: Chest x-ray from 11/17/2017 TECHNIQUE: CT examination of the chest was performed  Reformatted images were created in axial, sagittal, and coronal planes  Radiation dose length product (DLP) for this visit:  206 mGy-cm     This examination, like all CT scans performed in the [facility], was performed utilizing techniques to minimize radiation dose exposure, including the use of iterative reconstruction and automated exposure control  IV Contrast:  85 mL of iohexol (OMNIPAQUE)     FINDINGS: LUNGS:  There are emphysematous changes at the lung apex  There is patchy airspace opacity and consolidation throughout the right lung, particularly in the right middle and lower lobes  Infrahilar opacity on x-ray corresponds to this airspace disease  There is mild dependent atelectasis in the left lower lobe  There is a 2 mm left lower lobe lung nodule on series 601, image 55, unrelated to the airspace disease  PLEURA:  Unremarkable  HEART/GREAT VESSELS:  Unremarkable for patient's age  There is an aberrant right subclavian artery  MEDIASTINUM AND FOZIA:  Unremarkable  CHEST WALL AND LOWER NECK:       Normal  VISUALIZED STRUCTURES IN THE UPPER ABDOMEN:  Unremarkable  OSSEOUS STRUCTURES:  There is an age-indeterminate fracture of the right coracoid process  While there is no evidence of healing, multiple healing right rib fractures are also identified  There is a displaced fracture of the manubrium and distal sternum as well as a fracture of the T2 vertebral body  Correlation for recent trauma recommended  Impression: 1  Right-sided airspace opacity with middle and lower lobe consolidation, concerning for pneumonia  This corresponds to the infrahilar opacity seen on chest x-ray  2   Multiple fractures including the manubrium, sternum, coracoid process of the right shoulder, T2 vertebral body, and right ribs  Correlation for recent trauma is recommended  Note that the right rib fractures demonstrate evidence of healing and are subacute while additional fractures are age-indeterminate  3   Centrilobular emphysema with 2 mm left lower lobe nodule           Based on current Fleischner Society 2017 Guidelines on incidental pulmonary nodule, because the patient is considered high risk for lung cancer, 12 month follow-up non-contrast chest CT is recommended  Considerations related to the patient's age and/or comorbidities may be used to alter these recommendations  Workstation performed: [id number]        Recent Cultures (last 7 days):       Results from last 7 days  Lab Units 11/17/17  1731 11/17/17  1429   BLOOD CULTURE  No Growth After 5 Days  No Growth After 5 Days  Last 24 Hours Medication List:     acetaminophen 975 mg Oral Q8H [address]   cefuroxime 500 mg Oral Q12H [address]   donepezil 10 mg Oral HS   heparin (porcine) 5,000 Units Subcutaneous Q8H [address]   lidocaine 1 patch Transdermal Daily   memantine 10 mg Oral HS   potassium chloride 20 mEq Oral Daily With Breakfast   senna-docusate sodium 1 tablet Oral BID        Today, Patient Was Seen By: Fredy Gilmore MD    ** Please Note: Dragon 360 Dictation voice to text software may have been used in the creation of this document   **

## 2017-11-24 LAB
ANION GAP SERPL CALCULATED.3IONS-SCNC: 6 MMOL/L (ref 4–13)
BASOPHILS # BLD AUTO: 0.06 THOUSANDS/ΜL (ref 0–0.1)
BASOPHILS NFR BLD AUTO: 1 % (ref 0–1)
BUN SERPL-MCNC: 22 MG/DL (ref 5–25)
CALCIUM SERPL-MCNC: 8.5 MG/DL (ref 8.3–10.1)
CHLORIDE SERPL-SCNC: 103 MMOL/L (ref 100–108)
CO2 SERPL-SCNC: 27 MMOL/L (ref 21–32)
CREAT SERPL-MCNC: 0.69 MG/DL (ref 0.6–1.3)
EOSINOPHIL # BLD AUTO: 0.2 THOUSAND/ΜL (ref 0–0.61)
EOSINOPHIL NFR BLD AUTO: 2 % (ref 0–6)
ERYTHROCYTE [DISTWIDTH] IN BLOOD BY AUTOMATED COUNT: 15.9 % (ref 11.6–15.1)
GFR SERPL CREATININE-BSD FRML MDRD: 80 ML/MIN/1.73SQ M
GLUCOSE SERPL-MCNC: 97 MG/DL (ref 65–140)
HCT VFR BLD AUTO: 33.1 % (ref 34.8–46.1)
HGB BLD-MCNC: 10.4 G/DL (ref 11.5–15.4)
LYMPHOCYTES # BLD AUTO: 1.52 THOUSANDS/ΜL (ref 0.6–4.47)
LYMPHOCYTES NFR BLD AUTO: 15 % (ref 14–44)
MCH RBC QN AUTO: 27.7 PG (ref 26.8–34.3)
MCHC RBC AUTO-ENTMCNC: 31.4 G/DL (ref 31.4–37.4)
MCV RBC AUTO: 88 FL (ref 82–98)
MONOCYTES # BLD AUTO: 0.56 THOUSAND/ΜL (ref 0.17–1.22)
MONOCYTES NFR BLD AUTO: 5 % (ref 4–12)
NEUTROPHILS # BLD AUTO: 8.06 THOUSANDS/ΜL (ref 1.85–7.62)
NEUTS SEG NFR BLD AUTO: 77 % (ref 43–75)
NRBC BLD AUTO-RTO: 0 /100 WBCS
PLATELET # BLD AUTO: 439 THOUSANDS/UL (ref 149–390)
PMV BLD AUTO: 9 FL (ref 8.9–12.7)
POTASSIUM SERPL-SCNC: 4.7 MMOL/L (ref 3.5–5.3)
RBC # BLD AUTO: 3.75 MILLION/UL (ref 3.81–5.12)
SODIUM SERPL-SCNC: 136 MMOL/L (ref 136–145)
WBC # BLD AUTO: 10.4 THOUSAND/UL (ref 4.31–10.16)

## 2017-11-24 PROCEDURE — 97110 THERAPEUTIC EXERCISES: CPT

## 2017-11-24 PROCEDURE — 97116 GAIT TRAINING THERAPY: CPT

## 2017-11-24 PROCEDURE — 85025 COMPLETE CBC W/AUTO DIFF WBC: CPT | Performed by: INTERNAL MEDICINE

## 2017-11-24 PROCEDURE — 97535 SELF CARE MNGMENT TRAINING: CPT

## 2017-11-24 PROCEDURE — 97530 THERAPEUTIC ACTIVITIES: CPT

## 2017-11-24 PROCEDURE — 80048 BASIC METABOLIC PNL TOTAL CA: CPT | Performed by: INTERNAL MEDICINE

## 2017-11-24 RX ORDER — NICOTINE 21 MG/24HR
14 PATCH, TRANSDERMAL 24 HOURS TRANSDERMAL DAILY
Status: DISCONTINUED | OUTPATIENT
Start: 2017-11-24 | End: 2017-11-28 | Stop reason: HOSPADM

## 2017-11-24 RX ADMIN — ACETAMINOPHEN 975 MG: 325 TABLET ORAL at 02:48

## 2017-11-24 RX ADMIN — HEPARIN SODIUM 5000 UNITS: 5000 INJECTION, SOLUTION INTRAVENOUS; SUBCUTANEOUS at 06:25

## 2017-11-24 RX ADMIN — DONEPEZIL HYDROCHLORIDE 10 MG: 5 TABLET, FILM COATED ORAL at 21:03

## 2017-11-24 RX ADMIN — HEPARIN SODIUM 5000 UNITS: 5000 INJECTION, SOLUTION INTRAVENOUS; SUBCUTANEOUS at 21:02

## 2017-11-24 RX ADMIN — NICOTINE 14 MG: 14 PATCH TRANSDERMAL at 12:29

## 2017-11-24 RX ADMIN — ACETAMINOPHEN 975 MG: 325 TABLET ORAL at 12:29

## 2017-11-24 RX ADMIN — MEMANTINE 10 MG: 5 TABLET ORAL at 21:04

## 2017-11-24 RX ADMIN — HEPARIN SODIUM 5000 UNITS: 5000 INJECTION, SOLUTION INTRAVENOUS; SUBCUTANEOUS at 16:30

## 2017-11-24 RX ADMIN — ACETAMINOPHEN 975 MG: 325 TABLET ORAL at 19:12

## 2017-11-24 RX ADMIN — LIDOCAINE 1 PATCH: 50 PATCH CUTANEOUS at 08:13

## 2017-11-24 NOTE — PROGRESS NOTES
Progress Note - Lawyer Ray 80 y o  female MRN: [id number]    Unit/Bed#: E4 -01 Encounter: [id number]      Assessment/Plan:  80year old female with:     1  Toxic metabolic encephalopathy/delirium- multifactorial, waxing and waning with agitation today  Delirium precautions as previously outlined  Continued supportive care and management of acute and chronic medical conditions per primary team     Avoid deliriogenic medications including tramadol, benadryl, benzodiazepines, sleep aids such as ambien  Continued reassurance and redirection, avoid use of restraints, recommend bedside sitter rather than restraints  If behavioral symptoms not controlled by nonpharmacologic measures, could consider use of Haldol 0 5 IM Q6h PRN  Ensure adequate nutrition and hydration in the setting of diarrhea      2  Dementia- moderate, likely Alzheimer vs mixed type per history  Continue supportive care  TSH mildly elevated, free T4 WNL; consider repeat TFTs in 6-8 weeks outpatient  Recommend [facility] at the [facility]iatric assessment  Continue aricept and namenda, consideration for outpatient taper of these medications  Patient requires 24/7 care for safety       3  Ambulatory dysfunction and fall- PT/OT, fall precautions  Agree with recommendation for rehab at discharge  Continue scheduled acetaminophen and lidoderm patch for pain control in the setting of rib fractures, patient may not reliably related pain in setting of #2; see geriatric pain order set       4  Constipation- resolved, no with loose stool/diarrhea; agree with holding docusate-senna; monitor, ensure adequate nutrition and hydration     5  Deconditioning- Multifactorial, PT/OT, supportive care, nutritional support; agree with rehab at discharge     6  Visual impairment- with history of glaucoma; continue supportive care; frequent verbal reorientation and reassurance     7   Medication review- medications reviewed with recommendations as outlined  Will next assess patient on Monday, 11/27  Subjective:   Patient seen and examined for geriatric follow up  Agitated today, "I want a cigarette" and also complaining of being cold  Nursing aid at bedside for hygiene and personal care, patient is cooperating with her at present  Noted to have episodes of loose stool, Cdiff negative  Remaining ROS unobtainable due to agitation  Objective:     Vitals: Blood pressure 147/80, pulse 76, temperature 97 9 °F (36 6 °C), temperature source Tympanic, resp  rate 20, height 5' 4" (1 626 m), weight 56 2 kg (123 lb 14 4 oz), SpO2 95 %  ,Body mass index is 21 27 kg/m²  Intake/Output Summary (Last 24 hours) at 11/24/17 1005  Last data filed at 11/23/17 1700   Gross per 24 hour   Intake              600 ml   Output             1000 ml   Net             -400 ml       Physical Exam:   Awake and alert  Reclined in bed  +agitation (verbal)  NC/AT, MMM, oropharynx clear, missing/poor dentition  No noted respiratory distress, no significant lower extremity edema  Oriented to person only, +agitation    Invasive Devices     Peripheral Intravenous Line            Peripheral IV 11/20/17 Left Forearm 3 days                Lab, Imaging and other studies: I have personally reviewed pertinent reports

## 2017-11-24 NOTE — PLAN OF CARE
Problem: Potential for Falls  Goal: Patient will remain free of falls  INTERVENTIONS:  - Assess patient frequently for physical needs  -  Identify cognitive and physical deficits and behaviors that affect risk of falls    -  [place] fall precautions as indicated by assessment   - Educate patient/family on patient safety including physical limitations  - Instruct patient to call for assistance with activity based on assessment  - Modify environment to reduce risk of injury  - Consider OT/PT consult to assist with strengthening/mobility   Outcome: Progressing      Problem: Prexisting or High Potential for Compromised Skin Integrity  Goal: Skin integrity is maintained or improved  INTERVENTIONS:  - Identify patients at risk for skin breakdown  - Assess and monitor skin integrity  - Assess and monitor nutrition and hydration status  - Monitor labs (i e  albumin)  - Assess for incontinence   - Turn and reposition patient  - Assist with mobility/ambulation  - Relieve pressure over bony prominences  - Avoid friction and shearing  - Provide appropriate hygiene as needed including keeping skin clean and dry  - Evaluate need for skin moisturizer/barrier cream  - Collaborate with interdisciplinary team (i e  Nutrition, Rehabilitation, etc )   - Patient/family teaching   Outcome: Progressing      Problem: PAIN - ADULT  Goal: Verbalizes/displays adequate comfort level or baseline comfort level  Interventions:  - Encourage patient to monitor pain and request assistance  - Assess pain using appropriate pain scale  - Administer analgesics based on type and severity of pain and evaluate response  - Implement non-pharmacological measures as appropriate and evaluate response  - Consider cultural and social influences on pain and pain management  - Notify physician/advanced practitioner if interventions unsuccessful or patient reports new pain   Outcome: Progressing      Problem: INFECTION - ADULT  Goal: Absence or prevention of progression during hospitalization  INTERVENTIONS:  - Assess and monitor for signs and symptoms of infection  - Monitor lab/diagnostic results  - Monitor all insertion sites, i e  indwelling lines, tubes, and drains  - Monitor endotracheal (as able) and nasal secretions for changes in amount and color  - [place] appropriate cooling/warming therapies per order  - Administer medications as ordered  - Instruct and encourage patient and family to use good hand hygiene technique  - Identify and instruct in appropriate isolation precautions for identified infection/condition   Outcome: Progressing    Goal: Absence of fever/infection during neutropenic period  INTERVENTIONS:  - Monitor WBC  - Implement neutropenic guidelines   Outcome: Progressing      Problem: SAFETY ADULT  Goal: Maintain or return to baseline ADL function  INTERVENTIONS:  -  Assess patient's ability to carry out ADLs; assess patient's baseline for ADL function and identify physical deficits which impact ability to perform ADLs (bathing, care of mouth/teeth, toileting, grooming, dressing, etc )  - Assess/evaluate cause of self-care deficits   - Assess range of motion  - Assess patient's mobility; develop plan if impaired  - Assess patient's need for assistive devices and provide as appropriate  - Encourage maximum independence but intervene and supervise when necessary  ¯ Involve family in performance of ADLs  ¯ Assess for home care needs following discharge   ¯ Request OT consult to assist with ADL evaluation and planning for discharge  ¯ Provide patient education as appropriate   Outcome: Progressing    Goal: Maintain or return mobility status to optimal level  INTERVENTIONS:  - Assess patient's baseline mobility status (ambulation, transfers, stairs, etc )    - Identify cognitive and physical deficits and behaviors that affect mobility  - Identify mobility aids required to assist with transfers and/or ambulation (gait belt, sit-to-stand, lift, walker, cane, etc )  - [place] fall precautions as indicated by assessment  - Record patient progress and toleration of activity level on Mobility SBAR; progress patient to next Phase/Stage  - Instruct patient to call for assistance with activity based on assessment  - Request Rehabilitation consult to assist with strengthening/weightbearing, etc    Outcome: Progressing      Problem: DISCHARGE PLANNING  Goal: Discharge to home or other facility with appropriate resources  INTERVENTIONS:  - Identify barriers to discharge w/patient and caregiver  - Arrange for needed discharge resources and transportation as appropriate  - Identify discharge learning needs (meds, wound care, etc )  - Arrange for interpretive services to assist at discharge as needed  - Refer to Case Management Department for coordinating discharge planning if the patient needs post-hospital services based on physician/advanced practitioner order or complex needs related to functional status, cognitive ability, or social support system   Outcome: Progressing      Problem: Knowledge Deficit  Goal: Patient/family/caregiver demonstrates understanding of disease process, treatment plan, medications, and discharge instructions  Complete learning assessment and assess knowledge base    Interventions:  - Provide teaching at level of understanding  - Provide teaching via preferred learning methods   Outcome: Progressing      Problem: DISCHARGE PLANNING - CARE MANAGEMENT  Goal: Discharge to post-acute care or home with appropriate resources  INTERVENTIONS:  - Conduct assessment to determine patient/family and health care team treatment goals, and need for post-acute services based on payer coverage, community resources, and patient preferences, and barriers to discharge  - Address psychosocial, clinical, and financial barriers to discharge as identified in assessment in conjunction with the patient/family and health care team  - Arrange appropriate level of post-acute services according to patients   needs and preference and payer coverage in collaboration with the physician and health care team  - Communicate with and update the patient/family, physician, and health care team regarding progress on the discharge plan  - Arrange appropriate transportation to post-acute venues   Outcome: Progressing

## 2017-11-24 NOTE — PLAN OF CARE
Problem: PHYSICAL THERAPY ADULT  Goal: Performs mobility at highest level of function for planned discharge setting  See evaluation for individualized goals  Treatment/Interventions: Functional transfer training, LE strengthening/ROM, Therapeutic exercise, Endurance training, Cognitive reorientation, Patient/family training, Equipment eval/education, Bed mobility, Gait training, Spoke to nursing, OT          See flowsheet documentation for full assessment, interventions and recommendations  Outcome: Progressing  Prognosis: Fair  Problem List: Decreased strength, Decreased endurance, Impaired balance, Decreased mobility, Decreased cognition, Decreased safety awareness, Impaired judgement, Impaired vision  Assessment: PT much more awake and alert t/o entire session  Improved focus to task and command following  Pt  Performed static standing with mod assist x1 with verbal cues to extend b/l knees and stand tall  Pt progressed with ambulation distances to 10'x1  Pt requires constant cues for sequencing, increased step lengths and b/l knee extension  Pt remains at increased risk for falls  Pt is easily fatigued and requires seated rests  PT remained seated out of bed in recliner after PT session  Chair alarm activated  Recommend rehab at d/c due to increased assistance needed for all aspects of mobility  Barriers to Discharge: Decreased caregiver support (pt needs max assist at present)     Recommendation: Post acute IP rehab     PT - OK to Discharge: Yes (To rehab)    See flowsheet documentation for full assessment

## 2017-11-24 NOTE — WOUND OSTOMY CARE
Progress Note - Wound   Domo Jorgensendict 80 y o  female MRN: [id number]  Unit/Bed#: E4 -01 Encounter: [id number]      Assessment:   Patient seen for wound care follow-up  Patient not cooperative for thorough assessment  Patient continues to be incontinent  Sacral sitting in recliner  Denies pain  Findings:  1  Present on admission stage 2 pressure injury of right buttock--improving  2   Present on admission stage 2 pressure injury of left buttock--RESOLVED  Area with fragile epithelization, dry peeling edges      Bilateral buttocks are erythematous and blanchable around open areas  Bilateral heels and skin folds are WNL  Plan:   1  Turn and reposition patient every 2 hours  2   Elevate heels off of bed on pillows to offload  3   Moisturize skin daily with nourishing lotion  4   Apply Hydraguard lotion to b/l heels BID & PRN  5   Dust open areas to right buttock with stomahesive powder, cover with calazime paste and apply Hydraguard lotion to left buttock TID & PRN  6   Sofcare cushion when OOB to chair  7   Wound care team to follow weekly      Plan of care reviewed with primary RN, Danilo Martínez  Objective:    Vitals: Blood pressure 147/80, pulse 76, temperature 97 9 °F (36 6 °C), temperature source Tympanic, resp  rate 20, height 5' 4" (1 626 m), weight 56 2 kg (123 lb 14 4 oz), SpO2 95 %  ,Body mass index is 21 27 kg/m²        Pressure Ulcer 11/17/17 Buttocks Right (Active)   Staging Stage II 11/24/2017 11:00 AM   Wound Description Clean;Dry;Pink 11/24/2017 11:00 AM   Debi-wound Assessment Clean;Dry;Erythema 11/24/2017 11:00 AM   Shape round 11/19/2017  8:03 AM   Wound Length (cm) 0 7 cm 11/24/2017 11:00 AM   Wound Width (cm) 1 cm 11/24/2017 11:00 AM   Wound Depth (cm) 0 1 11/24/2017 11:00 AM   Calculated Wound Area (cm^2) 0 7 cm^2 11/24/2017 11:00 AM   Calculated Wound Volume (cm^3) 0 07 cm^3 11/24/2017 11:00 AM   Change in Wound Size % 30 11/24/2017 11:00 AM   Drainage Amount None 11/24/2017 11:00 AM   Drainage Description Bloody 11/20/2017 12:00 PM   Treatment Offload; Turn & reposition 11/24/2017 11:00 AM   Dressing Protective barrier 11/24/2017 11:00 AM   Patient Tolerance Tolerated well 11/24/2017 11:00 AM   Dressing Status Open to air 11/21/2017 11:45 PM     Please contact the wound care team at extension [phone] with any questions    Melchor WOLFN, RN, CCRN

## 2017-11-24 NOTE — SOCIAL WORK
CM met with liadivya Barrientos from [facility]  She was here checking on patient's progress  She reported that patient looked much better and she thought that OT had gotten her up and in the chair  She was going to go back to the facility and discuss the matter with the director and get back to CM  However, family's first choice is [facility], due to distance  It is the closest facility to family  CM sent them an email via [address] to please let CM know status of acceptance

## 2017-11-24 NOTE — PROGRESS NOTES
[facility] Progress Note  Patient: Jt Pérez 80 y o  female   MRN: [id number]  PCP: Katelyn Dinh, DO  Unit/Bed#: E4 -01 Encounter: [id number]  Date Of Visit: 11/24/17    Assessment:    Principal Problem:    Acute CHF (congestive heart failure) ([facility] )  Active Problems:    Hypertension    CHF (congestive heart failure) ([facility] )    Alzheimer disease    Urinary retention      Plan:    · Acute CHF probably diastolic component but possibly in relation to more acute symptoms of right middle and lower lobe pneumonia had 3 day course of Zithromax ceftriaxone with resolution of leukocytosis  changed to p o  Ceftin but will DC  with onset of diarrhea results   ·  2D echocardiogram done over weekend noted 55% EF with no regional wall motion abnormalities and wall thickness upper limits with mildly dilated atrium appears euvolemic and Cardiology is discontinue diuretics    · Right lower lobe pneumonia continue c leukocytosis is trended down and approaching normalization remains afebrile  changed to oral antibiotics but will be discontinued due to onset of diarrhea  · Essential hypertension on no meds  · Alzheimers dementia on combination Namenda and Aricept more confused since admission possibly as result of environmental issues and her inflammatory process tiarra Mcguire Pickup -service saw and suggestions noted B12 low normal levels also will need rehab as has significant ambulatory dysfunction and multiple falls with multiple rib fractures of late  · Ambulatory dysfunction with multiple fractures including to the manubrium coracoid process right rib fractures some which are subacute others unable to determine T2 vertebral fracture all this points for need for rehab as the patient is at significant further risk from present living arrangement awaiting disposition to short-term rehab  · Diarrhea possibly induced by recent course of antibiotics will discontinue cephalexin at this point and check for C diff for was negative so no need for contact isolation further      VTE Pharmacologic Prophylaxis:   Pharmacologic: Heparin  Mechanical VTE Prophylaxis in Place: Yes    Discussions with Specialists or Other Care Team Provider:  No    Time Spent for Care: 30 minutes  More than 50% of total time spent on counseling and coordination of care as described above  Subjective:   Poor historian mobile Ng into uninterpretable and tangential thoughts in no acute distress however no respiratory distress now off O2 without apparent dyspnea  Staff noting onset of diarrhea without reference of abdominal pain  This has seemingly all improved in last 24 hours    Objective:     Vitals:   Temp (24hrs), Av 5 °F (36 9 °C), Min:97 8 °F (36 6 °C), Max:99 6 °F (37 6 °C)    HR:  [76-78] 76  Resp:  [20-22] 20  BP: (118-158)/(78-98) 147/80  SpO2:  [93 %-95 %] 95 %  Body mass index is 21 27 kg/m²  Input and Output Summary (last 24 hours):        Intake/Output Summary (Last 24 hours) at 17 0850  Last data filed at 17 1700   Gross per 24 hour   Intake             1080 ml   Output             1000 ml   Net               80 ml       Physical Exam:     Physical Exam:   General appearance: alert, appears stated age, cooperative and distracted  Head: Normocephalic, without obvious abnormality, atraumatic  Lungs: rhonchi RLL  Heart: regular rate and rhythm  Abdomen: soft, non-tender; bowel sounds normal; no masses,  no organomegaly  Back: negative  Extremities: extremities normal, atraumatic, no cyanosis or edema  Neurologic: Grossly normal      Additional Data:     Labs:      Results from last 7 days  Lab Units 17  0500   WBC Thousand/uL 10 40*   HEMOGLOBIN g/dL 10 4*   HEMATOCRIT % 33 1*   PLATELETS Thousands/uL 439*   NEUTROS PCT % 77*   LYMPHS PCT % 15   MONOS PCT % 5   EOS PCT % 2       Results from last 7 days  Lab Units 17  0500  17  1429   SODIUM mmol/L 136  < > 138   POTASSIUM mmol/L 4 7  < > 4 0   CHLORIDE mmol/L 103  < > 103   CO2 mmol/L 27  < > 28   BUN mg/dL 22  < > 23   CREATININE mg/dL 0 69  < > 0 79   CALCIUM mg/dL 8 5  < > 8 4   TOTAL PROTEIN g/dL  --   --  6 6   BILIRUBIN TOTAL mg/dL  --   --  0 31   ALK PHOS U/L  --   --  109   ALT U/L  --   --  24   AST U/L  --   --  36   GLUCOSE RANDOM mg/dL 97  < > 102   < > = values in this interval not displayed  Results from last 7 days  Lab Units 11/17/17  1429   INR  1 16       * I Have Reviewed All Lab Data Listed Above  * Additional Pertinent Lab Tests Reviewed: All Labs For Current Hospital Admission Reviewed    Imaging:  Xr Chest 2 Views    Result Date: 11/17/2017  Narrative: CHEST INDICATION:  Cough, weakness  History taken directly from the electronic ordering system  COMPARISON:  None VIEWS:  Frontal and lateral projections IMAGES:  2 FINDINGS:     Normal cardiac silhouette  Increased interstitial and vascular congestion  Patchy infiltrates right mid to lower lung field  Increased soft tissue density also noted in the left infrahilar region  Probable small effusions  No pneumothorax  Visualized osseous structures appear within normal limits for the patient's age  Impression: Increased interstitial and vascular congestion  Patchy infiltrates right mid to lower lung field  Increased soft tissue density also noted in the left infrahilar region  Probable small effusions  Consider CT evaluation  Workstation performed: [id number]     Ct Head Without Contrast    Result Date: 11/17/2017  Narrative: CT BRAIN - WITHOUT CONTRAST INDICATION:  Fall, weakness  COMPARISON:  None  TECHNIQUE:  CT examination of the brain was performed  In addition to axial images, coronal reformatted images were created and submitted for interpretation  Radiation dose length product (DLP) for this visit:  1048 mGy-cm     This examination, like all CT scans performed in the [facility], was performed utilizing techniques to minimize radiation dose exposure, including the use of iterative reconstruction and automated exposure control  IMAGE QUALITY:  Diagnostic  FINDINGS:  PARENCHYMA:  Decreased attenuation is noted in the supratentorial white matter demonstrating an appearance most consistent with mild microangiopathic change  No intracranial mass, mass effect or midline shift  No CT signs of acute infarction  There is no parenchymal hemorrhage  VENTRICLES AND EXTRA-AXIAL SPACES:  Normal for patient's age  VISUALIZED ORBITS AND PARANASAL SINUSES:  Unremarkable  CALVARIUM AND EXTRACRANIAL SOFT TISSUES:   Normal      Impression: No acute intracranial abnormality  Workstation performed: [id number]     Ct Chest W Contrast    Result Date: 11/18/2017  Narrative: CT CHEST WITH IV CONTRAST INDICATION:  Follow-up right lung opacity on chest x-ray COMPARISON: Chest x-ray from 11/17/2017 TECHNIQUE: CT examination of the chest was performed  Reformatted images were created in axial, sagittal, and coronal planes  Radiation dose length product (DLP) for this visit:  206 mGy-cm   This examination, like all CT scans performed in the [facility], was performed utilizing techniques to minimize radiation dose exposure, including the use of iterative reconstruction and automated exposure control  IV Contrast:  85 mL of iohexol (OMNIPAQUE)     FINDINGS: LUNGS:  There are emphysematous changes at the lung apex  There is patchy airspace opacity and consolidation throughout the right lung, particularly in the right middle and lower lobes  Infrahilar opacity on x-ray corresponds to this airspace disease  There is mild dependent atelectasis in the left lower lobe  There is a 2 mm left lower lobe lung nodule on series 601, image 55, unrelated to the airspace disease  PLEURA:  Unremarkable  HEART/GREAT VESSELS:  Unremarkable for patient's age  There is an aberrant right subclavian artery  MEDIASTINUM AND FOZIA:  Unremarkable   CHEST WALL AND LOWER NECK:       Normal  VISUALIZED STRUCTURES IN THE UPPER ABDOMEN:  Unremarkable  OSSEOUS STRUCTURES:  There is an age-indeterminate fracture of the right coracoid process  While there is no evidence of healing, multiple healing right rib fractures are also identified  There is a displaced fracture of the manubrium and distal sternum as well as a fracture of the T2 vertebral body  Correlation for recent trauma recommended  Impression: 1  Right-sided airspace opacity with middle and lower lobe consolidation, concerning for pneumonia  This corresponds to the infrahilar opacity seen on chest x-ray  2   Multiple fractures including the manubrium, sternum, coracoid process of the right shoulder, T2 vertebral body, and right ribs  Correlation for recent trauma is recommended  Note that the right rib fractures demonstrate evidence of healing and are subacute while additional fractures are age-indeterminate  3   Centrilobular emphysema with 2 mm left lower lobe nodule  Based on current Fleischner Society 2017 Guidelines on incidental pulmonary nodule, because the patient is considered high risk for lung cancer, 12 month follow-up non-contrast chest CT is recommended  Considerations related to the patient's age and/or comorbidities may be used to alter these recommendations  Workstation performed: [id number]     Imaging Reports Reviewed Today Include:  Reviewed chest x-rays CT imaging  Imaging Personally Reviewed by Myself Includes:    Procedure: Xr Chest 2 Views    Result Date: 11/17/2017  Narrative: CHEST INDICATION:  Cough, weakness  History taken directly from the electronic ordering system  COMPARISON:  None VIEWS:  Frontal and lateral projections IMAGES:  2 FINDINGS:     Normal cardiac silhouette  Increased interstitial and vascular congestion  Patchy infiltrates right mid to lower lung field  Increased soft tissue density also noted in the left infrahilar region  Probable small effusions  No pneumothorax   Visualized osseous structures appear within normal limits for the patient's age  Impression: Increased interstitial and vascular congestion  Patchy infiltrates right mid to lower lung field  Increased soft tissue density also noted in the left infrahilar region  Probable small effusions  Consider CT evaluation  Workstation performed: [id number]     Procedure: Ct Head Without Contrast    Result Date: 11/17/2017  Narrative: CT BRAIN - WITHOUT CONTRAST INDICATION:  Fall, weakness  COMPARISON:  None  TECHNIQUE:  CT examination of the brain was performed  In addition to axial images, coronal reformatted images were created and submitted for interpretation  Radiation dose length product (DLP) for this visit:  1048 mGy-cm   This examination, like all CT scans performed in the [facility], was performed utilizing techniques to minimize radiation dose exposure, including the use of iterative reconstruction and automated exposure control  IMAGE QUALITY:  Diagnostic  FINDINGS:  PARENCHYMA:  Decreased attenuation is noted in the supratentorial white matter demonstrating an appearance most consistent with mild microangiopathic change  No intracranial mass, mass effect or midline shift  No CT signs of acute infarction  There is no parenchymal hemorrhage  VENTRICLES AND EXTRA-AXIAL SPACES:  Normal for patient's age  VISUALIZED ORBITS AND PARANASAL SINUSES:  Unremarkable  CALVARIUM AND EXTRACRANIAL SOFT TISSUES:   Normal      Impression: No acute intracranial abnormality  Workstation performed: [id number]     Procedure: Ct Chest W Contrast    Result Date: 11/18/2017  Narrative: CT CHEST WITH IV CONTRAST INDICATION:  Follow-up right lung opacity on chest x-ray COMPARISON: Chest x-ray from 11/17/2017 TECHNIQUE: CT examination of the chest was performed  Reformatted images were created in axial, sagittal, and coronal planes  Radiation dose length product (DLP) for this visit:  206 mGy-cm     This examination, like all CT scans performed in the [facility], was performed utilizing techniques to minimize radiation dose exposure, including the use of iterative reconstruction and automated exposure control  IV Contrast:  85 mL of iohexol (OMNIPAQUE)     FINDINGS: LUNGS:  There are emphysematous changes at the lung apex  There is patchy airspace opacity and consolidation throughout the right lung, particularly in the right middle and lower lobes  Infrahilar opacity on x-ray corresponds to this airspace disease  There is mild dependent atelectasis in the left lower lobe  There is a 2 mm left lower lobe lung nodule on series 601, image 55, unrelated to the airspace disease  PLEURA:  Unremarkable  HEART/GREAT VESSELS:  Unremarkable for patient's age  There is an aberrant right subclavian artery  MEDIASTINUM AND FOZIA:  Unremarkable  CHEST WALL AND LOWER NECK:       Normal  VISUALIZED STRUCTURES IN THE UPPER ABDOMEN:  Unremarkable  OSSEOUS STRUCTURES:  There is an age-indeterminate fracture of the right coracoid process  While there is no evidence of healing, multiple healing right rib fractures are also identified  There is a displaced fracture of the manubrium and distal sternum as well as a fracture of the T2 vertebral body  Correlation for recent trauma recommended  Impression: 1  Right-sided airspace opacity with middle and lower lobe consolidation, concerning for pneumonia  This corresponds to the infrahilar opacity seen on chest x-ray  2   Multiple fractures including the manubrium, sternum, coracoid process of the right shoulder, T2 vertebral body, and right ribs  Correlation for recent trauma is recommended  Note that the right rib fractures demonstrate evidence of healing and are subacute while additional fractures are age-indeterminate  3   Centrilobular emphysema with 2 mm left lower lobe nodule           Based on current Fleischner Society 2017 Guidelines on incidental pulmonary nodule, because the patient is considered high risk for lung cancer, 12 month follow-up non-contrast chest CT is recommended  Considerations related to the patient's age and/or comorbidities may be used to alter these recommendations  Workstation performed: [id number]        Recent Cultures (last 7 days):       Results from last 7 days  Lab Units 11/23/17  1051 11/17/17  1731 11/17/17  1429   BLOOD CULTURE   --  No Growth After 5 Days  No Growth After 5 Days  C DIFF TOXIN B  NEGATIVE for C difficle toxin by PCR    --   --        Last 24 Hours Medication List:     acetaminophen 975 mg Oral Q8H [address]   donepezil 10 mg Oral HS   heparin (porcine) 5,000 Units Subcutaneous Q8H [address]   lidocaine 1 patch Transdermal Daily   memantine 10 mg Oral HS   potassium chloride 20 mEq Oral Daily With Breakfast        Today, Patient Was Seen By: Babatunde Arriaga MD    ** Please Note: Dragon 360 Dictation voice to text software may have been used in the creation of this document   **

## 2017-11-24 NOTE — OCCUPATIONAL THERAPY NOTE
Occupational Therapy Treatment Note:         11/24/17 1233   Restrictions/Precautions   Weight Bearing Precautions Per Order No   Other Precautions Fall Risk;Pain; Chair Alarm; Bed Alarm;Cognitive   Pain Assessment   Pain Assessment No/denies pain   Pain Score No Pain   Pain Location Generalized   Pain Orientation Bilateral   Diversional Activities Television   ADL   Where Assessed Chair   Grooming Assistance 4  Minimal Assistance   Grooming Deficit Setup;Verbal cueing;Supervision/safety; Increased time to complete;Wash/dry hands; Wash/dry face;Brushing hair   Grooming Comments cues to engage in activity required  LB Dressing Assistance 2  Maximal Assistance   LB Dressing Deficit Setup;Steadying; Requires assistive device for steadying;Verbal cueing;Supervision/safety; Increased time to complete; Don/doff R sock; Don/doff L sock; Thread LLE into underwear; Thread RLE into underwear;Pull up over hips   LB Dressing Comments Limited functional balance noted  Toileting Assistance  2  Maximal Assistance   Toileting Deficit Setup;Steadying;Verbal cueing;Supervison/safety; Increased time to complete; Bedside commode;Clothing management up;Clothing management down;Perineal hygiene   Toileting Comments pt requested self toileting  Pt able to have BM and urinate with A provided  Functional Standing Tolerance   Time 2 mins  Activity dynamic stand balance activity  Comments Increased A required to matain safe balance due to cognitive deficits and noted weakness  Transfers   Sit to Stand 3  Moderate assistance   Additional items Assist x 1; Armrests; Increased time required;Verbal cues   Stand to Sit 4  Minimal assistance   Additional items Assist x 1; Armrests; Increased time required;Verbal cues   Additional Comments Improved stand tolerance noted this tx session      Cognition   Overall Cognitive Status Impaired   Arousal/Participation Alert   Attention Attends with cues to redirect   Orientation Level Oriented to person;Oriented to place   Memory Decreased short term memory;Decreased recall of recent events;Decreased recall of precautions   Following Commands Follows one step commands with increased time or repetition   Comments Pt able to follow simple commands with f carry over  Cognition Assessment Tools Other (Comment)  (functional observation  )   Additional Activities   Additional Activities Other (Comment)  (reviewed current plan of care/basic orientation)   Additional Activities Comments Pt reports F uderstanding inspite of noted confusion  Activity Tolerance   Activity Tolerance Patient limited by fatigue   Medical Staff Made Aware reported all findings to nursing staff  Assessment   [facility] of basic orientation, self toileting, sit to stand transfers and review of current plan of care  Pt may benefit from further rehab with focus on achieving optimal performance levels with all functional tasks  Plan   Treatment Interventions ADL retraining;Functional transfer training; Endurance training;Cognitive reorientation;UE strengthening/ROM   Goal Expiration Date 11/28/17   Treatment Day 2   OT Frequency 3-5x/wk   Recommendation   OT Discharge Recommendation Short Term Rehab   ISAIAH Stein

## 2017-11-24 NOTE — PLAN OF CARE
Problem: OCCUPATIONAL THERAPY ADULT  Goal: Performs self-care activities at highest level of function for planned discharge setting  See evaluation for individualized goals  Treatment Interventions: ADL retraining, Functional transfer training, Endurance training, UE strengthening/ROM, Cognitive reorientation, Patient/family training, Equipment evaluation/education, Compensatory technique education          See flowsheet documentation for full assessment, interventions and recommendations  Outcome: Progressing  Limitation: Decreased ADL status, Decreased UE ROM, Decreased UE strength, Decreased Safe judgement during ADL, Decreased cognition, Decreased endurance, Decreased high-level ADLs  Prognosis: Fair  Assessment: Pt was seen for skilled OT with focsu on completion of basic orientation, self toileting, sit to stand transfers and review of current plan of care  Pt may benefit from further rehab with focus on achieving optimal performance levels with all functional tasks        OT Discharge Recommendation: Short Term Rehab         Comments: ISAIAH Hand

## 2017-11-25 RX ADMIN — HEPARIN SODIUM 5000 UNITS: 5000 INJECTION, SOLUTION INTRAVENOUS; SUBCUTANEOUS at 21:49

## 2017-11-25 RX ADMIN — LIDOCAINE 1 PATCH: 50 PATCH CUTANEOUS at 09:00

## 2017-11-25 RX ADMIN — HEPARIN SODIUM 5000 UNITS: 5000 INJECTION, SOLUTION INTRAVENOUS; SUBCUTANEOUS at 15:00

## 2017-11-25 RX ADMIN — MEMANTINE 10 MG: 5 TABLET ORAL at 21:56

## 2017-11-25 RX ADMIN — ACETAMINOPHEN 975 MG: 325 TABLET ORAL at 03:00

## 2017-11-25 RX ADMIN — ACETAMINOPHEN 975 MG: 325 TABLET ORAL at 21:48

## 2017-11-25 RX ADMIN — DONEPEZIL HYDROCHLORIDE 10 MG: 5 TABLET, FILM COATED ORAL at 21:48

## 2017-11-25 RX ADMIN — HEPARIN SODIUM 5000 UNITS: 5000 INJECTION, SOLUTION INTRAVENOUS; SUBCUTANEOUS at 05:10

## 2017-11-25 RX ADMIN — NICOTINE 14 MG: 14 PATCH TRANSDERMAL at 09:03

## 2017-11-25 RX ADMIN — ACETAMINOPHEN 975 MG: 325 TABLET ORAL at 11:38

## 2017-11-25 NOTE — PROGRESS NOTES
[facility] Progress Note  Patient: Todd Bloom 80 y o  female   MRN: [id number]  PCP: Harjit Rodriguez DO  Unit/Bed#: E4 -01 Encounter: [id number]  Date Of Visit: 11/25/17    Assessment:    Principal Problem:    Acute CHF (congestive heart failure) ([facility] )  Active Problems:    Hypertension    CHF (congestive heart failure) ([facility] )    Alzheimer disease    Urinary retention      Plan:    · Acute CHF probably diastolic component but possibly in relation to more acute symptoms of right middle and lower lobe pneumonia had 3 day course of Zithromax ceftriaxone with resolution of leukocytosis  changed to p o  Ceftin but will DC  with onset of diarrhea results   ·  2D echocardiogram done over weekend noted 55% EF with no regional wall motion abnormalities and wall thickness upper limits with mildly dilated atrium appears euvolemic and Cardiology is discontinue diuretics    · Right lower lobe pneumonia continue c leukocytosis is trended down and approaching normalization remains afebrile  changed to oral antibiotics but will be discontinued due to onset of diarrhea  · Essential hypertension on no meds  · Alzheimers dementia on combination Namenda and Aricept more confused since admission possibly as result of environmental issues and her inflammatory process tiarra Alberto -service saw and suggestions noted B12 low normal levels also will need rehab as has significant ambulatory dysfunction and multiple falls with multiple rib fractures of late  · Ambulatory dysfunction with multiple fractures including to the manubrium coracoid process right rib fractures some which are subacute others unable to determine T2 vertebral fracture all this points for need for rehab as the patient is at significant further risk from present living arrangement awaiting disposition to short-term rehab  · Diarrhea possibly induced by recent course of antibiotics will discontinue cephalexin at this point the check for C diff was negative so no need for contact isolation further      VTE Pharmacologic Prophylaxis:   Pharmacologic: Heparin  Mechanical VTE Prophylaxis in Place: Yes    Discussions with Specialists or Other Care Team Provider:  No    Time Spent for Care: 30 minutes  More than 50% of total time spent on counseling and coordination of care as described above  Subjective:   Poor historian mobile Ng into uninterpretable and tangential thoughts in no acute distress however no respiratory distress now off O2 without apparent dyspnea     Had periods of agitation yesterday constantly asking for a cigarette and was placed on a nicotine patch her periods of agitation and actually worsened during her hospital stay had did explain to a brother that this is probably in relation to change in environment in setting of her advanced dementia    Objective:     Vitals:   Temp (24hrs), Av 8 °F (36 6 °C), Min:97 °F (36 1 °C), Max:98 5 °F (36 9 °C)    HR:  [78-89] 83  Resp:  [16-20] 18  BP: (120-139)/(68-79) 137/69  SpO2:  [94 %-96 %] 94 %  Body mass index is 22 78 kg/m²  Input and Output Summary (last 24 hours):        Intake/Output Summary (Last 24 hours) at 17 6907  Last data filed at 17 2230   Gross per 24 hour   Intake              490 ml   Output              425 ml   Net               65 ml       Physical Exam:     Physical Exam:   General appearance: appears stated age, cooperative and distracted disoriented to time and place  Head: Normocephalic, without obvious abnormality, atraumatic  Lungs: rhonchi RLL  Heart: regular rate and rhythm  Abdomen: soft, non-tender; bowel sounds normal; no masses,  no organomegaly  Back: negative  Extremities: extremities normal, atraumatic, no cyanosis or edema  Neurologic: Grossly normal      Additional Data:     Labs:      Results from last 7 days  Lab Units 17  0500   WBC Thousand/uL 10 40*   HEMOGLOBIN g/dL 10 4*   HEMATOCRIT % 33 1*   PLATELETS Thousands/uL 439*   NEUTROS PCT % 77*   LYMPHS PCT % 15   MONOS PCT % 5   EOS PCT % 2       Results from last 7 days  Lab Units 11/24/17  0500   SODIUM mmol/L 136   POTASSIUM mmol/L 4 7   CHLORIDE mmol/L 103   CO2 mmol/L 27   BUN mg/dL 22   CREATININE mg/dL 0 69   CALCIUM mg/dL 8 5   GLUCOSE RANDOM mg/dL 97           * I Have Reviewed All Lab Data Listed Above  * Additional Pertinent Lab Tests Reviewed: All Labs For Current Hospital Admission Reviewed    Imaging:  Xr Chest 2 Views    Result Date: 11/17/2017  Narrative: CHEST INDICATION:  Cough, weakness  History taken directly from the electronic ordering system  COMPARISON:  None VIEWS:  Frontal and lateral projections IMAGES:  2 FINDINGS:     Normal cardiac silhouette  Increased interstitial and vascular congestion  Patchy infiltrates right mid to lower lung field  Increased soft tissue density also noted in the left infrahilar region  Probable small effusions  No pneumothorax  Visualized osseous structures appear within normal limits for the patient's age  Impression: Increased interstitial and vascular congestion  Patchy infiltrates right mid to lower lung field  Increased soft tissue density also noted in the left infrahilar region  Probable small effusions  Consider CT evaluation  Workstation performed: [id number]     Ct Head Without Contrast    Result Date: 11/17/2017  Narrative: CT BRAIN - WITHOUT CONTRAST INDICATION:  Fall, weakness  COMPARISON:  None  TECHNIQUE:  CT examination of the brain was performed  In addition to axial images, coronal reformatted images were created and submitted for interpretation  Radiation dose length product (DLP) for this visit:  1048 mGy-cm   This examination, like all CT scans performed in the [facility], was performed utilizing techniques to minimize radiation dose exposure, including the use of iterative reconstruction and automated exposure control  IMAGE QUALITY:  Diagnostic   FINDINGS:  PARENCHYMA:  Decreased attenuation is noted in the supratentorial white matter demonstrating an appearance most consistent with mild microangiopathic change  No intracranial mass, mass effect or midline shift  No CT signs of acute infarction  There is no parenchymal hemorrhage  VENTRICLES AND EXTRA-AXIAL SPACES:  Normal for patient's age  VISUALIZED ORBITS AND PARANASAL SINUSES:  Unremarkable  CALVARIUM AND EXTRACRANIAL SOFT TISSUES:   Normal      Impression: No acute intracranial abnormality  Workstation performed: [id number]     Ct Chest W Contrast    Result Date: 11/18/2017  Narrative: CT CHEST WITH IV CONTRAST INDICATION:  Follow-up right lung opacity on chest x-ray COMPARISON: Chest x-ray from 11/17/2017 TECHNIQUE: CT examination of the chest was performed  Reformatted images were created in axial, sagittal, and coronal planes  Radiation dose length product (DLP) for this visit:  206 mGy-cm   This examination, like all CT scans performed in the [facility], was performed utilizing techniques to minimize radiation dose exposure, including the use of iterative reconstruction and automated exposure control  IV Contrast:  85 mL of iohexol (OMNIPAQUE)     FINDINGS: LUNGS:  There are emphysematous changes at the lung apex  There is patchy airspace opacity and consolidation throughout the right lung, particularly in the right middle and lower lobes  Infrahilar opacity on x-ray corresponds to this airspace disease  There is mild dependent atelectasis in the left lower lobe  There is a 2 mm left lower lobe lung nodule on series 601, image 55, unrelated to the airspace disease  PLEURA:  Unremarkable  HEART/GREAT VESSELS:  Unremarkable for patient's age  There is an aberrant right subclavian artery  MEDIASTINUM AND FOZIA:  Unremarkable  CHEST WALL AND LOWER NECK:       Normal  VISUALIZED STRUCTURES IN THE UPPER ABDOMEN:  Unremarkable   OSSEOUS STRUCTURES:  There is an age-indeterminate fracture of the right coracoid process  While there is no evidence of healing, multiple healing right rib fractures are also identified  There is a displaced fracture of the manubrium and distal sternum as well as a fracture of the T2 vertebral body  Correlation for recent trauma recommended  Impression: 1  Right-sided airspace opacity with middle and lower lobe consolidation, concerning for pneumonia  This corresponds to the infrahilar opacity seen on chest x-ray  2   Multiple fractures including the manubrium, sternum, coracoid process of the right shoulder, T2 vertebral body, and right ribs  Correlation for recent trauma is recommended  Note that the right rib fractures demonstrate evidence of healing and are subacute while additional fractures are age-indeterminate  3   Centrilobular emphysema with 2 mm left lower lobe nodule  Based on current Fleischner Society 2017 Guidelines on incidental pulmonary nodule, because the patient is considered high risk for lung cancer, 12 month follow-up non-contrast chest CT is recommended  Considerations related to the patient's age and/or comorbidities may be used to alter these recommendations  Workstation performed: [id number]     Imaging Reports Reviewed Today Include:  Reviewed chest x-rays CT imaging  Imaging Personally Reviewed by Myself Includes:    Procedure: Xr Chest 2 Views    Result Date: 11/17/2017  Narrative: CHEST INDICATION:  Cough, weakness  History taken directly from the electronic ordering system  COMPARISON:  None VIEWS:  Frontal and lateral projections IMAGES:  2 FINDINGS:     Normal cardiac silhouette  Increased interstitial and vascular congestion  Patchy infiltrates right mid to lower lung field  Increased soft tissue density also noted in the left infrahilar region  Probable small effusions  No pneumothorax  Visualized osseous structures appear within normal limits for the patient's age  Impression: Increased interstitial and vascular congestion    Patchy infiltrates right mid to lower lung field  Increased soft tissue density also noted in the left infrahilar region  Probable small effusions  Consider CT evaluation  Workstation performed: [id number]     Procedure: Ct Head Without Contrast    Result Date: 11/17/2017  Narrative: CT BRAIN - WITHOUT CONTRAST INDICATION:  Fall, weakness  COMPARISON:  None  TECHNIQUE:  CT examination of the brain was performed  In addition to axial images, coronal reformatted images were created and submitted for interpretation  Radiation dose length product (DLP) for this visit:  1048 mGy-cm   This examination, like all CT scans performed in the [facility], was performed utilizing techniques to minimize radiation dose exposure, including the use of iterative reconstruction and automated exposure control  IMAGE QUALITY:  Diagnostic  FINDINGS:  PARENCHYMA:  Decreased attenuation is noted in the supratentorial white matter demonstrating an appearance most consistent with mild microangiopathic change  No intracranial mass, mass effect or midline shift  No CT signs of acute infarction  There is no parenchymal hemorrhage  VENTRICLES AND EXTRA-AXIAL SPACES:  Normal for patient's age  VISUALIZED ORBITS AND PARANASAL SINUSES:  Unremarkable  CALVARIUM AND EXTRACRANIAL SOFT TISSUES:   Normal      Impression: No acute intracranial abnormality  Workstation performed: [id number]     Procedure: Ct Chest W Contrast    Result Date: 11/18/2017  Narrative: CT CHEST WITH IV CONTRAST INDICATION:  Follow-up right lung opacity on chest x-ray COMPARISON: Chest x-ray from 11/17/2017 TECHNIQUE: CT examination of the chest was performed  Reformatted images were created in axial, sagittal, and coronal planes  Radiation dose length product (DLP) for this visit:  206 mGy-cm     This examination, like all CT scans performed in the [facility], was performed utilizing techniques to minimize radiation dose exposure, including the use of iterative reconstruction and automated exposure control  IV Contrast:  85 mL of iohexol (OMNIPAQUE)     FINDINGS: LUNGS:  There are emphysematous changes at the lung apex  There is patchy airspace opacity and consolidation throughout the right lung, particularly in the right middle and lower lobes  Infrahilar opacity on x-ray corresponds to this airspace disease  There is mild dependent atelectasis in the left lower lobe  There is a 2 mm left lower lobe lung nodule on series 601, image 55, unrelated to the airspace disease  PLEURA:  Unremarkable  HEART/GREAT VESSELS:  Unremarkable for patient's age  There is an aberrant right subclavian artery  MEDIASTINUM AND FOZIA:  Unremarkable  CHEST WALL AND LOWER NECK:       Normal  VISUALIZED STRUCTURES IN THE UPPER ABDOMEN:  Unremarkable  OSSEOUS STRUCTURES:  There is an age-indeterminate fracture of the right coracoid process  While there is no evidence of healing, multiple healing right rib fractures are also identified  There is a displaced fracture of the manubrium and distal sternum as well as a fracture of the T2 vertebral body  Correlation for recent trauma recommended  Impression: 1  Right-sided airspace opacity with middle and lower lobe consolidation, concerning for pneumonia  This corresponds to the infrahilar opacity seen on chest x-ray  2   Multiple fractures including the manubrium, sternum, coracoid process of the right shoulder, T2 vertebral body, and right ribs  Correlation for recent trauma is recommended  Note that the right rib fractures demonstrate evidence of healing and are subacute while additional fractures are age-indeterminate  3   Centrilobular emphysema with 2 mm left lower lobe nodule  Based on current Fleischner Society 2017 Guidelines on incidental pulmonary nodule, because the patient is considered high risk for lung cancer, 12 month follow-up non-contrast chest CT is recommended   Considerations related to the patient's age and/or comorbidities may be used to alter these recommendations  Workstation performed: [id number]        Recent Cultures (last 7 days):       Results from last 7 days  Lab Units 11/23/17  1051   C DIFF TOXIN B  NEGATIVE for C difficle toxin by PCR  Last 24 Hours Medication List:     acetaminophen 975 mg Oral Q8H [address]   donepezil 10 mg Oral HS   heparin (porcine) 5,000 Units Subcutaneous Q8H [address]   lidocaine 1 patch Transdermal Daily   memantine 10 mg Oral HS   nicotine 14 mg Transdermal Daily        Today, Patient Was Seen By: Oma Hobbs MD    ** Please Note: Dragon 360 Dictation voice to text software may have been used in the creation of this document   **

## 2017-11-25 NOTE — PLAN OF CARE
Problem: Prexisting or High Potential for Compromised Skin Integrity  Goal: Skin integrity is maintained or improved  INTERVENTIONS:  - Identify patients at risk for skin breakdown  - Assess and monitor skin integrity  - Assess and monitor nutrition and hydration status  - Monitor labs (i e  albumin)  - Assess for incontinence   - Turn and reposition patient  - Assist with mobility/ambulation  - Relieve pressure over bony prominences  - Avoid friction and shearing  - Provide appropriate hygiene as needed including keeping skin clean and dry  - Evaluate need for skin moisturizer/barrier cream  - Collaborate with interdisciplinary team (i e  Nutrition, Rehabilitation, etc )   - Patient/family teaching   Outcome: Progressing      Problem: PAIN - ADULT  Goal: Verbalizes/displays adequate comfort level or baseline comfort level  Interventions:  - Encourage patient to monitor pain and request assistance  - Assess pain using appropriate pain scale  - Administer analgesics based on type and severity of pain and evaluate response  - Implement non-pharmacological measures as appropriate and evaluate response  - Consider cultural and social influences on pain and pain management  - Notify physician/advanced practitioner if interventions unsuccessful or patient reports new pain   Outcome: Progressing      Problem: INFECTION - ADULT  Goal: Absence or prevention of progression during hospitalization  INTERVENTIONS:  - Assess and monitor for signs and symptoms of infection  - Monitor lab/diagnostic results  - Monitor all insertion sites, i e  indwelling lines, tubes, and drains  - Monitor endotracheal (as able) and nasal secretions for changes in amount and color  - [place] appropriate cooling/warming therapies per order  - Administer medications as ordered  - Instruct and encourage patient and family to use good hand hygiene technique  - Identify and instruct in appropriate isolation precautions for identified infection/condition   Outcome: Progressing    Goal: Absence of fever/infection during neutropenic period  INTERVENTIONS:  - Monitor WBC  - Implement neutropenic guidelines   Outcome: Progressing      Problem: SAFETY ADULT  Goal: Maintain or return to baseline ADL function  INTERVENTIONS:  -  Assess patient's ability to carry out ADLs; assess patient's baseline for ADL function and identify physical deficits which impact ability to perform ADLs (bathing, care of mouth/teeth, toileting, grooming, dressing, etc )  - Assess/evaluate cause of self-care deficits   - Assess range of motion  - Assess patient's mobility; develop plan if impaired  - Assess patient's need for assistive devices and provide as appropriate  - Encourage maximum independence but intervene and supervise when necessary  ¯ Involve family in performance of ADLs  ¯ Assess for home care needs following discharge   ¯ Request OT consult to assist with ADL evaluation and planning for discharge  ¯ Provide patient education as appropriate   Outcome: Progressing    Goal: Maintain or return mobility status to optimal level  INTERVENTIONS:  - Assess patient's baseline mobility status (ambulation, transfers, stairs, etc )    - Identify cognitive and physical deficits and behaviors that affect mobility  - Identify mobility aids required to assist with transfers and/or ambulation (gait belt, sit-to-stand, lift, walker, cane, etc )  - [place] fall precautions as indicated by assessment  - Record patient progress and toleration of activity level on Mobility SBAR; progress patient to next Phase/Stage  - Instruct patient to call for assistance with activity based on assessment  - Request Rehabilitation consult to assist with strengthening/weightbearing, etc    Outcome: Progressing      Problem: DISCHARGE PLANNING  Goal: Discharge to home or other facility with appropriate resources  INTERVENTIONS:  - Identify barriers to discharge w/patient and caregiver  - Arrange for needed discharge resources and transportation as appropriate  - Identify discharge learning needs (meds, wound care, etc )  - Arrange for interpretive services to assist at discharge as needed  - Refer to Case Management Department for coordinating discharge planning if the patient needs post-hospital services based on physician/advanced practitioner order or complex needs related to functional status, cognitive ability, or social support system   Outcome: Progressing      Problem: Knowledge Deficit  Goal: Patient/family/caregiver demonstrates understanding of disease process, treatment plan, medications, and discharge instructions  Complete learning assessment and assess knowledge base    Interventions:  - Provide teaching at level of understanding  - Provide teaching via preferred learning methods   Outcome: Progressing      Problem: DISCHARGE PLANNING - CARE MANAGEMENT  Goal: Discharge to post-acute care or home with appropriate resources  INTERVENTIONS:  - Conduct assessment to determine patient/family and health care team treatment goals, and need for post-acute services based on payer coverage, community resources, and patient preferences, and barriers to discharge  - Address psychosocial, clinical, and financial barriers to discharge as identified in assessment in conjunction with the patient/family and health care team  - Arrange appropriate level of post-acute services according to patients   needs and preference and payer coverage in collaboration with the physician and health care team  - Communicate with and update the patient/family, physician, and health care team regarding progress on the discharge plan  - Arrange appropriate transportation to post-acute venues   Outcome: Progressing

## 2017-11-26 RX ADMIN — HEPARIN SODIUM 5000 UNITS: 5000 INJECTION, SOLUTION INTRAVENOUS; SUBCUTANEOUS at 06:10

## 2017-11-26 RX ADMIN — LIDOCAINE 1 PATCH: 50 PATCH CUTANEOUS at 09:54

## 2017-11-26 RX ADMIN — NICOTINE 14 MG: 14 PATCH TRANSDERMAL at 09:54

## 2017-11-26 RX ADMIN — HEPARIN SODIUM 5000 UNITS: 5000 INJECTION, SOLUTION INTRAVENOUS; SUBCUTANEOUS at 22:34

## 2017-11-26 RX ADMIN — ACETAMINOPHEN 975 MG: 325 TABLET ORAL at 10:00

## 2017-11-26 RX ADMIN — DONEPEZIL HYDROCHLORIDE 10 MG: 5 TABLET, FILM COATED ORAL at 22:35

## 2017-11-26 RX ADMIN — ACETAMINOPHEN 975 MG: 325 TABLET ORAL at 18:25

## 2017-11-26 RX ADMIN — ACETAMINOPHEN 975 MG: 325 TABLET ORAL at 06:10

## 2017-11-26 RX ADMIN — MEMANTINE 10 MG: 5 TABLET ORAL at 22:35

## 2017-11-26 RX ADMIN — HEPARIN SODIUM 5000 UNITS: 5000 INJECTION, SOLUTION INTRAVENOUS; SUBCUTANEOUS at 13:38

## 2017-11-26 NOTE — PROGRESS NOTES
[facility] Progress Note  Patient: Jori Katz 80 y o  female   MRN: [id number]  PCP: Marc Mcginnis DO  Unit/Bed#: E4 -01 Encounter: [id number]  Date Of Visit: 11/26/17    Assessment:    Principal Problem:    Acute CHF (congestive heart failure) ([facility] )  Active Problems:    Hypertension    CHF (congestive heart failure) ([facility] )    Alzheimer disease    Urinary retention      Plan:    · Acute CHF probably diastolic component but possibly in relation to more acute symptoms of right middle and lower lobe pneumonia had 3 day course of Zithromax ceftriaxone with resolution of leukocytosis  changed to p o  Ceftin but will DC  with onset of diarrhea results   ·  2D echocardiogram done over weekend noted 55% EF with no regional wall motion abnormalities and wall thickness upper limits with mildly dilated atrium appears euvolemic and Cardiology is discontinue diuretics    · Right lower lobe pneumonia continue c leukocytosis is trended down and approaching normalization remains afebrile  changed to oral antibiotics but was discontinued due to onset of diarrhea  · Essential hypertension on no meds  · Alzheimers dementia on combination Namenda and Aricept more confused since admission possibly as result of environmental issues and her pain issues with fractures seen by Sheri Pickering -service  suggestions noted B12 low normal levels also will need rehab as has significant ambulatory dysfunction and multiple falls with multiple rib fractures of late  · Ambulatory dysfunction with multiple fractures including to the manubrium coracoid process right rib fractures some which are subacute others unable to determine T2 vertebral fracture all this points for need for rehab as the patient is at significant further risk from present living arrangement awaiting disposition to short-term rehab  · Diarrhea resolved      VTE Pharmacologic Prophylaxis:   Pharmacologic: Heparin  Mechanical VTE Prophylaxis in Place: Yes    Discussions with Specialists or Other Care Team Provider:  No    Time Spent for Care: 30 minutes  More than 50% of total time spent on counseling and coordination of care as described above  Subjective:   Poor historian  uninterpretable and tangential thoughts in no acute distress however no respiratory distress now off O2 without apparent dyspnea     Had periods of agitation constantly asking for a cigarette and was placed on a nicotine patch her periods of agitation and actually worsened during her hospital stay had did explain to a brother that this is probably in relation to change in environment in setting of her advanced dementia although needs short-term rehab doubt she can return to a previous living arrangement will follow need long-term care    Objective:     Vitals:   Temp (24hrs), Av 3 °F (36 8 °C), Min:98 °F (36 7 °C), Max:98 7 °F (37 1 °C)    HR:  [76-94] 76  Resp:  [18] 18  BP: (101-138)/(61-77) 138/71  SpO2:  [94 %-95 %] 95 %  Body mass index is 21 27 kg/m²  Input and Output Summary (last 24 hours):        Intake/Output Summary (Last 24 hours) at 17 0825  Last data filed at 17 1000   Gross per 24 hour   Intake              120 ml   Output                0 ml   Net              120 ml       Physical Exam:     Physical Exam:   General appearance: appears stated age, cooperative and distracted disoriented to time and place  Head: Normocephalic, without obvious abnormality, atraumatic  Lungs: rhonchi RLL  Heart: regular rate and rhythm  Abdomen: soft, non-tender; bowel sounds normal; no masses,  no organomegaly  Back: negative  Extremities: extremities normal, atraumatic, no cyanosis or edema  Neurologic: Grossly normal      Additional Data:     Labs:      Results from last 7 days  Lab Units 17  0500   WBC Thousand/uL 10 40*   HEMOGLOBIN g/dL 10 4*   HEMATOCRIT % 33 1*   PLATELETS Thousands/uL 439*   NEUTROS PCT % 77*   LYMPHS PCT % 15   MONOS PCT % 5   EOS PCT % 2 Results from last 7 days  Lab Units 11/24/17  0500   SODIUM mmol/L 136   POTASSIUM mmol/L 4 7   CHLORIDE mmol/L 103   CO2 mmol/L 27   BUN mg/dL 22   CREATININE mg/dL 0 69   CALCIUM mg/dL 8 5   GLUCOSE RANDOM mg/dL 97           * I Have Reviewed All Lab Data Listed Above  * Additional Pertinent Lab Tests Reviewed: All Labs For Current Hospital Admission Reviewed    Imaging:  Xr Chest 2 Views    Result Date: 11/17/2017  Narrative: CHEST INDICATION:  Cough, weakness  History taken directly from the electronic ordering system  COMPARISON:  None VIEWS:  Frontal and lateral projections IMAGES:  2 FINDINGS:     Normal cardiac silhouette  Increased interstitial and vascular congestion  Patchy infiltrates right mid to lower lung field  Increased soft tissue density also noted in the left infrahilar region  Probable small effusions  No pneumothorax  Visualized osseous structures appear within normal limits for the patient's age  Impression: Increased interstitial and vascular congestion  Patchy infiltrates right mid to lower lung field  Increased soft tissue density also noted in the left infrahilar region  Probable small effusions  Consider CT evaluation  Workstation performed: [id number]     Ct Head Without Contrast    Result Date: 11/17/2017  Narrative: CT BRAIN - WITHOUT CONTRAST INDICATION:  Fall, weakness  COMPARISON:  None  TECHNIQUE:  CT examination of the brain was performed  In addition to axial images, coronal reformatted images were created and submitted for interpretation  Radiation dose length product (DLP) for this visit:  1048 mGy-cm   This examination, like all CT scans performed in the [facility], was performed utilizing techniques to minimize radiation dose exposure, including the use of iterative reconstruction and automated exposure control  IMAGE QUALITY:  Diagnostic   FINDINGS:  PARENCHYMA:  Decreased attenuation is noted in the supratentorial white matter demonstrating an appearance most consistent with mild microangiopathic change  No intracranial mass, mass effect or midline shift  No CT signs of acute infarction  There is no parenchymal hemorrhage  VENTRICLES AND EXTRA-AXIAL SPACES:  Normal for patient's age  VISUALIZED ORBITS AND PARANASAL SINUSES:  Unremarkable  CALVARIUM AND EXTRACRANIAL SOFT TISSUES:   Normal      Impression: No acute intracranial abnormality  Workstation performed: [id number]     Ct Chest W Contrast    Result Date: 11/18/2017  Narrative: CT CHEST WITH IV CONTRAST INDICATION:  Follow-up right lung opacity on chest x-ray COMPARISON: Chest x-ray from 11/17/2017 TECHNIQUE: CT examination of the chest was performed  Reformatted images were created in axial, sagittal, and coronal planes  Radiation dose length product (DLP) for this visit:  206 mGy-cm   This examination, like all CT scans performed in the [facility], was performed utilizing techniques to minimize radiation dose exposure, including the use of iterative reconstruction and automated exposure control  IV Contrast:  85 mL of iohexol (OMNIPAQUE)     FINDINGS: LUNGS:  There are emphysematous changes at the lung apex  There is patchy airspace opacity and consolidation throughout the right lung, particularly in the right middle and lower lobes  Infrahilar opacity on x-ray corresponds to this airspace disease  There is mild dependent atelectasis in the left lower lobe  There is a 2 mm left lower lobe lung nodule on series 601, image 55, unrelated to the airspace disease  PLEURA:  Unremarkable  HEART/GREAT VESSELS:  Unremarkable for patient's age  There is an aberrant right subclavian artery  MEDIASTINUM AND FOZIA:  Unremarkable  CHEST WALL AND LOWER NECK:       Normal  VISUALIZED STRUCTURES IN THE UPPER ABDOMEN:  Unremarkable  OSSEOUS STRUCTURES:  There is an age-indeterminate fracture of the right coracoid process   While there is no evidence of healing, multiple healing right rib fractures are also identified  There is a displaced fracture of the manubrium and distal sternum as well as a fracture of the T2 vertebral body  Correlation for recent trauma recommended  Impression: 1  Right-sided airspace opacity with middle and lower lobe consolidation, concerning for pneumonia  This corresponds to the infrahilar opacity seen on chest x-ray  2   Multiple fractures including the manubrium, sternum, coracoid process of the right shoulder, T2 vertebral body, and right ribs  Correlation for recent trauma is recommended  Note that the right rib fractures demonstrate evidence of healing and are subacute while additional fractures are age-indeterminate  3   Centrilobular emphysema with 2 mm left lower lobe nodule  Based on current Fleischner Society 2017 Guidelines on incidental pulmonary nodule, because the patient is considered high risk for lung cancer, 12 month follow-up non-contrast chest CT is recommended  Considerations related to the patient's age and/or comorbidities may be used to alter these recommendations  Workstation performed: [id number]     Imaging Reports Reviewed Today Include:  Reviewed chest x-rays CT imaging  Imaging Personally Reviewed by Myself Includes:    Procedure: Xr Chest 2 Views    Result Date: 11/17/2017  Narrative: CHEST INDICATION:  Cough, weakness  History taken directly from the electronic ordering system  COMPARISON:  None VIEWS:  Frontal and lateral projections IMAGES:  2 FINDINGS:     Normal cardiac silhouette  Increased interstitial and vascular congestion  Patchy infiltrates right mid to lower lung field  Increased soft tissue density also noted in the left infrahilar region  Probable small effusions  No pneumothorax  Visualized osseous structures appear within normal limits for the patient's age  Impression: Increased interstitial and vascular congestion  Patchy infiltrates right mid to lower lung field    Increased soft tissue density also noted in the left infrahilar region  Probable small effusions  Consider CT evaluation  Workstation performed: [id number]     Procedure: Ct Head Without Contrast    Result Date: 11/17/2017  Narrative: CT BRAIN - WITHOUT CONTRAST INDICATION:  Fall, weakness  COMPARISON:  None  TECHNIQUE:  CT examination of the brain was performed  In addition to axial images, coronal reformatted images were created and submitted for interpretation  Radiation dose length product (DLP) for this visit:  1048 mGy-cm   This examination, like all CT scans performed in the [facility], was performed utilizing techniques to minimize radiation dose exposure, including the use of iterative reconstruction and automated exposure control  IMAGE QUALITY:  Diagnostic  FINDINGS:  PARENCHYMA:  Decreased attenuation is noted in the supratentorial white matter demonstrating an appearance most consistent with mild microangiopathic change  No intracranial mass, mass effect or midline shift  No CT signs of acute infarction  There is no parenchymal hemorrhage  VENTRICLES AND EXTRA-AXIAL SPACES:  Normal for patient's age  VISUALIZED ORBITS AND PARANASAL SINUSES:  Unremarkable  CALVARIUM AND EXTRACRANIAL SOFT TISSUES:   Normal      Impression: No acute intracranial abnormality  Workstation performed: [id number]     Procedure: Ct Chest W Contrast    Result Date: 11/18/2017  Narrative: CT CHEST WITH IV CONTRAST INDICATION:  Follow-up right lung opacity on chest x-ray COMPARISON: Chest x-ray from 11/17/2017 TECHNIQUE: CT examination of the chest was performed  Reformatted images were created in axial, sagittal, and coronal planes  Radiation dose length product (DLP) for this visit:  206 mGy-cm     This examination, like all CT scans performed in the [facility], was performed utilizing techniques to minimize radiation dose exposure, including the use of iterative reconstruction and automated exposure control  IV Contrast:  85 mL of iohexol (OMNIPAQUE)     FINDINGS: LUNGS:  There are emphysematous changes at the lung apex  There is patchy airspace opacity and consolidation throughout the right lung, particularly in the right middle and lower lobes  Infrahilar opacity on x-ray corresponds to this airspace disease  There is mild dependent atelectasis in the left lower lobe  There is a 2 mm left lower lobe lung nodule on series 601, image 55, unrelated to the airspace disease  PLEURA:  Unremarkable  HEART/GREAT VESSELS:  Unremarkable for patient's age  There is an aberrant right subclavian artery  MEDIASTINUM AND FOZIA:  Unremarkable  CHEST WALL AND LOWER NECK:       Normal  VISUALIZED STRUCTURES IN THE UPPER ABDOMEN:  Unremarkable  OSSEOUS STRUCTURES:  There is an age-indeterminate fracture of the right coracoid process  While there is no evidence of healing, multiple healing right rib fractures are also identified  There is a displaced fracture of the manubrium and distal sternum as well as a fracture of the T2 vertebral body  Correlation for recent trauma recommended  Impression: 1  Right-sided airspace opacity with middle and lower lobe consolidation, concerning for pneumonia  This corresponds to the infrahilar opacity seen on chest x-ray  2   Multiple fractures including the manubrium, sternum, coracoid process of the right shoulder, T2 vertebral body, and right ribs  Correlation for recent trauma is recommended  Note that the right rib fractures demonstrate evidence of healing and are subacute while additional fractures are age-indeterminate  3   Centrilobular emphysema with 2 mm left lower lobe nodule  Based on current Fleischner Society 2017 Guidelines on incidental pulmonary nodule, because the patient is considered high risk for lung cancer, 12 month follow-up non-contrast chest CT is recommended   Considerations related to the patient's age and/or comorbidities may be used to alter these recommendations  Workstation performed: [id number]        Recent Cultures (last 7 days):       Results from last 7 days  Lab Units 11/23/17  1051   C DIFF TOXIN B  NEGATIVE for C difficle toxin by PCR  Last 24 Hours Medication List:     acetaminophen 975 mg Oral Q8H [facility]   donepezil 10 mg Oral HS   heparin (porcine) 5,000 Units Subcutaneous Q8H [facility]   lidocaine 1 patch Transdermal Daily   memantine 10 mg Oral HS   nicotine 14 mg Transdermal Daily        Today, Patient Was Seen By: Clarisse Howard MD    ** Please Note: Dragon 360 Dictation voice to text software may have been used in the creation of this document   **

## 2017-11-26 NOTE — PLAN OF CARE
Problem: Potential for Falls  Goal: Patient will remain free of falls  INTERVENTIONS:  - Assess patient frequently for physical needs  -  Identify cognitive and physical deficits and behaviors that affect risk of falls    -  [place] fall precautions as indicated by assessment   - Educate patient/family on patient safety including physical limitations  - Instruct patient to call for assistance with activity based on assessment  - Modify environment to reduce risk of injury  - Consider OT/PT consult to assist with strengthening/mobility   Outcome: Progressing

## 2017-11-26 NOTE — PLAN OF CARE
Problem: Potential for Falls  Goal: Patient will remain free of falls  INTERVENTIONS:  - Assess patient frequently for physical needs  -  Identify cognitive and physical deficits and behaviors that affect risk of falls    -  [place] fall precautions as indicated by assessment   - Educate patient/family on patient safety including physical limitations  - Instruct patient to call for assistance with activity based on assessment  - Modify environment to reduce risk of injury  - Consider OT/PT consult to assist with strengthening/mobility   Outcome: Progressing

## 2017-11-27 PROBLEM — T07.XXXA MULTIPLE FRACTURES: Status: ACTIVE | Noted: 2017-11-27

## 2017-11-27 PROBLEM — I50.31 ACUTE DIASTOLIC CONGESTIVE HEART FAILURE (HCC): Status: ACTIVE | Noted: 2017-11-17

## 2017-11-27 PROBLEM — R26.2 AMBULATORY DYSFUNCTION: Status: ACTIVE | Noted: 2017-11-27

## 2017-11-27 PROBLEM — J18.9 PNEUMONIA OF RIGHT LOWER LOBE DUE TO INFECTIOUS ORGANISM: Status: ACTIVE | Noted: 2017-11-27

## 2017-11-27 LAB
25(OH)D2 SERPL-MCNC: <1 NG/ML
25(OH)D3 SERPL-MCNC: 3.4 NG/ML
25(OH)D3+25(OH)D2 SERPL-MCNC: 3.4 NG/ML

## 2017-11-27 PROCEDURE — G8979 MOBILITY GOAL STATUS: HCPCS

## 2017-11-27 PROCEDURE — 97110 THERAPEUTIC EXERCISES: CPT

## 2017-11-27 PROCEDURE — 97535 SELF CARE MNGMENT TRAINING: CPT

## 2017-11-27 PROCEDURE — 97164 PT RE-EVAL EST PLAN CARE: CPT

## 2017-11-27 PROCEDURE — G8978 MOBILITY CURRENT STATUS: HCPCS

## 2017-11-27 PROCEDURE — 97532 HB COGNITIVE SKILLS DEVELOPMENT: CPT

## 2017-11-27 RX ADMIN — HEPARIN SODIUM 5000 UNITS: 5000 INJECTION, SOLUTION INTRAVENOUS; SUBCUTANEOUS at 21:20

## 2017-11-27 RX ADMIN — MEMANTINE 10 MG: 5 TABLET ORAL at 21:18

## 2017-11-27 RX ADMIN — HEPARIN SODIUM 5000 UNITS: 5000 INJECTION, SOLUTION INTRAVENOUS; SUBCUTANEOUS at 06:47

## 2017-11-27 RX ADMIN — ACETAMINOPHEN 975 MG: 325 TABLET ORAL at 06:00

## 2017-11-27 RX ADMIN — DONEPEZIL HYDROCHLORIDE 10 MG: 5 TABLET, FILM COATED ORAL at 21:20

## 2017-11-27 RX ADMIN — ACETAMINOPHEN 975 MG: 325 TABLET ORAL at 12:03

## 2017-11-27 RX ADMIN — ACETAMINOPHEN 975 MG: 325 TABLET ORAL at 16:57

## 2017-11-27 RX ADMIN — HEPARIN SODIUM 5000 UNITS: 5000 INJECTION, SOLUTION INTRAVENOUS; SUBCUTANEOUS at 13:51

## 2017-11-27 RX ADMIN — LIDOCAINE 1 PATCH: 50 PATCH CUTANEOUS at 09:16

## 2017-11-27 RX ADMIN — NICOTINE 14 MG: 14 PATCH TRANSDERMAL at 09:18

## 2017-11-27 NOTE — PHYSICAL THERAPY NOTE
PT Re-Evaluation (25min)  (11:25-11:50)    Past Medical History:   Diagnosis Date    Alzheimer disease     CHF (congestive heart failure) ([address] )     Hypertension         11/27/17 1150   Note Type   Note type Re-eval   Pain Assessment   Pain Assessment No/denies pain   Home Living   Type of Home House  (in-law suite)   Home Layout Ramped entrance   [address] chair; Toilet raiser;Grab bars in [facility] Walker;Cane;Wheelchair-manual   Prior Function   Level of [place] Needs assistance with ADLs and functional mobility   Lives With Family  (brother + RENEE)   Receives Help From Home health  (2hrs/day)   ADL Assistance Needs assistance   Falls in the last 6 months >10   Restrictions/Precautions   Other Precautions Cognitive; Chair Alarm; Bed Alarm; Fall Risk;Hard of hearing   General   Family/Caregiver Present No   Cognition   Orientation Level Oriented to person   RUE Assessment   RUE Assessment WFL   RUE Strength   RUE Overall Strength Within Functional Limits - able to perform ADL tasks with strength   LUE Assessment   LUE Assessment WFL   LUE Strength   LUE Overall Strength Within Functional Limits - able to perform ADL tasks with strength   RLE Assessment   RLE Assessment WFL  (4/5)   LLE Assessment   LLE Assessment WFL  (4/5)   Coordination   Sensation WFL   Transfers   Sit to Stand 4  Minimal assistance   Additional items Assist x 1; Armrests; Verbal cues; Increased time required   Stand to Sit 4  Minimal assistance   Additional items Assist x 1;Verbal cues;Armrests   Ambulation/Elevation   Gait pattern Narrow PJ; Forward Flexion;Decreased foot clearance; Excessively slow; Shuffling   Gait Assistance 4  Minimal assist   Additional items Assist x 1;Verbal cues; Tactile cues  ((A) c RW management 2* impaired vision)   Assistive Device Rolling walker   Distance 10' + 5' + 40'x2 c seated rests   Balance   Static Sitting Good   Dynamic Sitting Fair +   Static Standing Fair -   Dynamic Standing Poor +   Ambulatory Poor +  (loss of balance noted requiring steadying (A) to correct)   Activity Tolerance   Activity Tolerance Patient limited by fatigue   Nurse Made Aware Sondra   Assessment   Prognosis Fair   Problem List Decreased strength; Impaired balance;Decreased endurance;Decreased mobility; Decreased cognition; Impaired judgement;Decreased safety awareness; Impaired vision   Assessment pt is an 86y/o f who presented to [facility] c increased lethargy + weakness  dx: PNA  demonstrates improvements in functional mobility since initial eval  performs mobility tasks min (A)x1 c increased cueing for technique + safety  ambulates distances of 40'x2 c RW c mod verbal cues for RW management + impaired vision  minor loss of balance noted while ambulating out of bathroom requiring steadying (A) to correct  cont to demonstrate deficits in strength, balance, gait quality, + activity tolerance  would benefit from cont skilled PT to maximize functional mobility + improve quality of life  upon d/c, recommend STR  Barriers to Discharge Decreased caregiver support   Goals   Patient Goals "to get better"  [facility] Expiration Date 12/07/17   Short Term Goal #1 1  increase strength 1/2 grade, 2 perform transfers safely c (S), 3  ambulate 150' c (S) + RW s overt loss of balance to improve quality of life   Plan   Treatment/Interventions Functional transfer training;LE strengthening/ROM; Therapeutic exercise; Endurance training;Patient/family training;Bed mobility;Gait training;Spoke to nursing;OT   PT Frequency 5x/wk   Recommendation   Recommendation Post acute IP rehab   PT - OK to Discharge Yes  (to STR)   Barthel Index   Feeding 5   Bathing 0   Grooming Score 0   Dressing Score 5   Bladder Score 5   Bowels Score 5   Toilet Use Score 5   Transfers (Bed/Chair) Score 10   Mobility (Level Surface) Score 0   Stairs Score 0   Barthel Index Score 35     Marco William, PT

## 2017-11-27 NOTE — OCCUPATIONAL THERAPY NOTE
Occupational Therapy Treatment Note:         11/27/17 1148   Restrictions/Precautions   Weight Bearing Precautions Per Order No   Other Precautions Fall Risk;Pain;Cognitive; Bed Alarm; Chair Alarm   Pain Assessment   Pain Assessment No/denies pain   Pain Score No Pain   Pain Type Chronic pain   ADL   Where Assessed Chair   Grooming Assistance 4  Minimal Assistance   Grooming Deficit Setup;Steadying;Supervision/safety;Verbal cueing; Increased time to complete;Wash/dry hands;Brushing hair   Grooming Comments hands on A required with standing at the sink  UB Bathing Assistance 4  Minimal Assistance   UB Bathing Deficit Setup;Verbal cueing;Supervision/safety   LB Bathing Assistance 3  Moderate Assistance   LB Bathing Deficit Setup;Steadying;Verbal cueing;Supervision/safety; Increased time to complete;Perineal area; Buttocks;Right lower leg including foot; Left lower leg including foot   LB Bathing Comments cues for safe hand placement with support of RW with functional reach required  UB Dressing Assistance 4  Minimal Assistance   UB Dressing Deficit Setup   LB Dressing Assistance 3  Moderate Assistance   LB Dressing Deficit Setup;Steadying; Requires assistive device for steadying;Verbal cueing;Supervision/safety; Increased time to complete; Don/doff L sock; Don/doff R sock; Thread LLE into underwear; Thread RLE into underwear;Pull up over hips   LB Dressing Comments Limited safety awareness noted  Limited functional reach noted  Toileting Assistance  3  Moderate Assistance   Toileting Deficit Steadying;Setup;Verbal cueing;Supervison/safety; Increased time to complete;Clothing management up;Clothing management down;Perineal hygiene   Toileting Comments Pt will benefit from hands on A with activity  Functional Standing Tolerance   Time 3 mins  Activity dynamic stand balance activity  Comments Improved functional balance noted  Poor safety awareness      Transfers   Sit to Stand 4  Minimal assistance   Additional items Assist x 1; Increased time required;Armrests; Verbal cues   Stand to Sit 4  Minimal assistance   Additional items Assist x 1; Increased time required;Armrests; Verbal cues   Stand pivot 4  Minimal assistance   Additional items Assist x 1; Armrests; Increased time required;Verbal cues   Toilet transfer 4  Minimal assistance   Additional items Assist x 1; Armrests; Increased time required;Verbal cues;Standard toilet   Additional Comments cues for safety required  Improved focus to tasks noted  Functional Mobility   Functional Mobility 4  Minimal assistance   Additional Comments x1   Additional items Rolling walker   Cognition   Overall Cognitive Status Impaired   Arousal/Participation Alert   Attention Attends with cues to redirect   Orientation Level Oriented to person;Oriented to place   Memory Decreased short term memory;Decreased recall of precautions;Decreased recall of recent events   Following Commands Follows one step commands without difficulty   Comments Pt with improved focus to tasks  Simple commands noted  Activity Tolerance   Activity Tolerance Patient limited by fatigue   Medical Staff Made Aware reported all findings to nursing staff  Assessment   Assessment Pt was seen for skilled OT with focus on completion of self care tasks, functional transfers and review of current plan of care, basic orientation  See above levels of A required for all functional tasks  Pt with visual deficits for sometime but improved focus to tasks  Able to follow simple commands with hands on A provided  Pt will benefit from further rehab with focus on achieving optimal performance levels with all functional tasks  Plan   Treatment Interventions ADL retraining;Functional transfer training;Cognitive reorientation; Endurance training;UE strengthening/ROM   Goal Expiration Date 11/28/17   Treatment Day 3   OT Frequency 3-5x/wk   Recommendation   OT Discharge Recommendation Short Term Rehab   ISAIAH Chen

## 2017-11-27 NOTE — PROGRESS NOTES
[address] Internal Medicine Progress Note  Patient: Shawn Stallings 80 y o  female   MRN: [id number]  PCP: Parvin Porter DO  Unit/Bed#: E4 -01 Encounter: [id number]  Date Of Visit: 11/27/17    Assessment and plan:    Principal Problem:    Acute diastolic congestive heart failure ([address] )- precipitated by right lower lobe pneumonia  Resolved  Appears euvolemic now  Has not required further diuretics  Active Problems:    Dementia without behavioral disturbance- Alzheimer's versus mixed type  Continue Aricept and Namenda  Watch for delirium  [facility]  Multiple fractures-secondary to ambulatory dysfunction  Currently pain-free  Avoid narcotics due to dementia    Hypertension-suspect this was reactive secondary to pain and delirium  Blood pressure has been stable without any medications  Pneumonia of right lower lobe due to infectious organism (HCC)-resolved  Antibiotics completed  Ambulatory dysfunction-awaiting short-term rehab placement  Diarrhea-resolved  C diff negative        VTE Pharmacologic Prophylaxis:   Pharmacologic: Heparin  Mechanical VTE Prophylaxis in Place: No    Discussions with Specialists or Other Care Team Provider:  Hospital course reviewed    Education and Discussions with Family / Patient:  Spoke with brother Chano Rich and gave update    Time Spent for Care: 30 minutes  More than 50% of total time spent on counseling and coordination of care as described above  Current Length of Stay: 10 day(s)    Current Patient Status: Inpatient   Certification Statement: The patient will continue to require additional inpatient hospital stay due to Placement    Discharge Plan: To rehab when bed is available    Code Status: Level 3 - DNAR and DNI      Subjective: The patient denies any pain in her chest   She denies any diarrhea  She still feels sleepy and wants to sleep some more this morning  She still thinks she is in [place]  No events overnight per nursing staff    Objective:     Vitals:   Temp (24hrs), Av 8 °F (37 1 °C), Min:98 5 °F (36 9 °C), Max:99 1 °F (37 3 °C)    HR:  [73-84] 75  Resp:  [18] 18  BP: (115-124)/(60-73) 124/73  SpO2:  [94 %-96 %] 94 %  Body mass index is 21 23 kg/m²  Input and Output Summary (last 24 hours): Intake/Output Summary (Last 24 hours) at 17 0810  Last data filed at 17 0900   Gross per 24 hour   Intake              560 ml   Output                0 ml   Net              560 ml       Physical Exam:     Physical Exam   Constitutional: She appears well-developed  No distress  HENT:   Head: Normocephalic  Mouth/Throat: No oropharyngeal exudate  Eyes: No scleral icterus  Neck: No JVD present  Cardiovascular: Regular rhythm  Exam reveals no friction rub  No murmur heard  Pulmonary/Chest: Effort normal  No stridor  No respiratory distress  She has no wheezes  Abdominal: Soft  She exhibits no distension  There is no tenderness  Musculoskeletal: She exhibits no edema  Neurological: She is alert  Skin: She is not diaphoretic  Additional Data:     Labs:      Results from last 7 days  Lab Units 17  0500   WBC Thousand/uL 10 40*   HEMOGLOBIN g/dL 10 4*   HEMATOCRIT % 33 1*   PLATELETS Thousands/uL 439*   NEUTROS PCT % 77*   LYMPHS PCT % 15   MONOS PCT % 5   EOS PCT % 2       Results from last 7 days  Lab Units 17  0500   SODIUM mmol/L 136   POTASSIUM mmol/L 4 7   CHLORIDE mmol/L 103   CO2 mmol/L 27   BUN mg/dL 22   CREATININE mg/dL 0 69   CALCIUM mg/dL 8 5   GLUCOSE RANDOM mg/dL 97           * I Have Reviewed All Lab Data Listed Above  * Additional Pertinent Lab Tests Reviewed: No New Labs Available For Today    Imaging:    Imaging Reports Reviewed Today Include:  Echocardiogram and CT chest      Recent Cultures (last 7 days):       Results from last 7 days  Lab Units 17  1051   C DIFF TOXIN B  NEGATIVE for C difficle toxin by PCR          Last 24 Hours Medication List:     acetaminophen 975 mg Oral Q8H [address]   donepezil 10 mg Oral HS   heparin (porcine) 5,000 Units Subcutaneous Q8H [address]   lidocaine 1 patch Transdermal Daily   memantine 10 mg Oral HS   nicotine 14 mg Transdermal Daily        Today, Patient Was Seen By: Magali Her MD    ** Please Note: Dragon 360 Dictation voice to text software may have been used in the creation of this document   **

## 2017-11-27 NOTE — PLAN OF CARE
Problem: OCCUPATIONAL THERAPY ADULT  Goal: Performs self-care activities at highest level of function for planned discharge setting  See evaluation for individualized goals  Treatment Interventions: ADL retraining, Functional transfer training, Endurance training, UE strengthening/ROM, Cognitive reorientation, Patient/family training, Equipment evaluation/education, Compensatory technique education          See flowsheet documentation for full assessment, interventions and recommendations  Outcome: Progressing  Limitation: Decreased ADL status, Decreased UE ROM, Decreased UE strength, Decreased Safe judgement during ADL, Decreased cognition, Decreased endurance, Decreased high-level ADLs  Prognosis: Fair  Assessment: Pt was seen for skilled OT with focus on completion of self care tasks, functional transfers and review of current plan of care, basic orientation  See above levels of A required for all functional tasks  Pt with visual deficits for sometime but improved focus to tasks  Able to follow simple commands with hands on A provided  Pt will benefit from further rehab with focus on achieving optimal performance levels with all functional tasks        OT Discharge Recommendation: Short Term Rehab         Comments: ISAIAH Dejesus

## 2017-11-27 NOTE — PHYSICAL THERAPY NOTE
PT Progress Note (10min)  (11:50-12:00)       11/27/17 1200   Pain Assessment   Pain Assessment No/denies pain   Restrictions/Precautions   Other Precautions Cognitive; Bed Alarm; Chair Alarm; Fall Risk;Hard of hearing   General   Chart Reviewed Yes   Response to Previous Treatment Patient with no complaints from previous session  Family/Caregiver Present No   Cognition   Orientation Level Oriented to person   Subjective   Subjective pt agreeable to ther ex education  "I hope they don't leave me alone tonight  I hope my family comes to see me"  Activity Tolerance   Activity Tolerance Patient limited by fatigue   Nurse Made Aware Sondra   Exercises   Quad Sets Supine;15 reps;AROM; Bilateral   Heelslides Sitting;15 reps;AROM; Bilateral   Glute Sets Sitting;15 reps;AROM; Bilateral   Hip Abduction Sitting;15 reps;AROM; Bilateral   Knee AROM Long Arc Quad Sitting;15 reps;AROM; Bilateral   Ankle Pumps Sitting;15 reps;AROM; Bilateral   Marching Sitting;15 reps;AROM; Bilateral   Assessment   Prognosis Fair   Problem List Decreased strength;Decreased endurance; Impaired balance;Decreased mobility; Decreased cognition; Impaired judgement;Decreased safety awareness; Impaired vision; Impaired hearing   Assessment pt agreeable to ther ex education  performed seated AROM ther ex B/L LE x15 reps c min verbal cues for technique  quad sets performed in supine  pt encouraged to sit OOB in recliner for meals c good verbal understanding  will cont skilled PT to further maximize functional mobility + improve quality of life  upon d/c, recommend STR  Barriers to Discharge Decreased caregiver support   Goals   Patient Goals "to get better"  [facility] Expiration Date 12/07/17   Treatment Day 1   Plan   Treatment/Interventions Functional transfer training;LE strengthening/ROM; Therapeutic exercise; Endurance training;Patient/family training;Bed mobility;Gait training;Spoke to nursing;OT   Progress Progressing toward goals   PT Frequency 5x/wk Recommendation   Recommendation Short-term skilled PT   PT - OK to Discharge Yes  (to STR)     James Chamorro, PT

## 2017-11-27 NOTE — SOCIAL WORK
CM faxed over all the documentation for insurance to provide an authorization; waiting now on Jazmín Huynh from [employer]  Received insurance authorization from [facility] for rehab  Number is [id number]  It is good for a Level I SNF for 7 days, to be reauth on 12/4 with Radhames Cotton who can be reached at   Insurance auth for [employer] transport is [id number]  Transport was scheduled with [email or web address] for 10:30 am tomorrow, 11/28/17  Informed: attending, RN, unit clerk, facility via [facility]  Spoke directly to brother, who is patient's POA  He also signed the IMM letter

## 2017-11-27 NOTE — PLAN OF CARE
Problem: PHYSICAL THERAPY ADULT  Goal: Performs mobility at highest level of function for planned discharge setting  See evaluation for individualized goals  Treatment/Interventions: Functional transfer training, LE strengthening/ROM, Therapeutic exercise, Endurance training, Cognitive reorientation, Patient/family training, Equipment eval/education, Bed mobility, Gait training, Spoke to nursing, OT          See flowsheet documentation for full assessment, interventions and recommendations  Outcome: Progressing  Prognosis: Fair  Problem List: Decreased strength, Impaired balance, Decreased endurance, Decreased mobility, Decreased cognition, Impaired judgement, Decreased safety awareness, Impaired vision  Assessment: pt is an 84y/o f who presented to [facility] c increased lethargy + weakness  dx: PNA  demonstrates improvements in functional mobility since initial eval  performs mobility tasks min (A)x1 c increased cueing for technique + safety  ambulates distances of 40'x2 c RW c mod verbal cues for RW management + impaired vision  minor loss of balance noted while ambulating out of bathroom requiring steadying (A) to correct  cont to demonstrate deficits in strength, balance, gait quality, + activity tolerance  would benefit from cont skilled PT to maximize functional mobility + improve quality of life  upon d/c, recommend STR  Barriers to Discharge: Decreased caregiver support     Recommendation: Post acute IP rehab     PT - OK to Discharge: Yes (to STR)    See flowsheet documentation for full assessment

## 2017-11-27 NOTE — PLAN OF CARE
Problem: PHYSICAL THERAPY ADULT  Goal: Performs mobility at highest level of function for planned discharge setting  See evaluation for individualized goals  Treatment/Interventions: Functional transfer training, LE strengthening/ROM, Therapeutic exercise, Endurance training, Cognitive reorientation, Patient/family training, Equipment eval/education, Bed mobility, Gait training, Spoke to nursing, OT          See flowsheet documentation for full assessment, interventions and recommendations  Outcome: Progressing  Prognosis: Fair  Problem List: Decreased strength, Decreased endurance, Impaired balance, Decreased mobility, Decreased cognition, Impaired judgement, Decreased safety awareness, Impaired vision, Impaired hearing  Assessment: pt agreeable to ther ex education  performed seated AROM ther ex B/L LE x15 reps c min verbal cues for technique  quad sets performed in supine  pt encouraged to sit OOB in recliner for meals c good verbal understanding  will cont skilled PT to further maximize functional mobility + improve quality of life  upon d/c, recommend STR  Barriers to Discharge: Decreased caregiver support     Recommendation: Short-term skilled PT     PT - OK to Discharge: Yes (to STR)    See flowsheet documentation for full assessment

## 2017-11-28 ENCOUNTER — GENERIC CONVERSION - ENCOUNTER (OUTPATIENT)
Dept: OTHER | Facility: OTHER | Age: 82
End: 2017-11-28

## 2017-11-28 VITALS
BODY MASS INDEX: 21 KG/M2 | HEART RATE: 76 BPM | WEIGHT: 123.02 LBS | SYSTOLIC BLOOD PRESSURE: 126 MMHG | TEMPERATURE: 99.7 F | OXYGEN SATURATION: 91 % | DIASTOLIC BLOOD PRESSURE: 66 MMHG | RESPIRATION RATE: 18 BRPM | HEIGHT: 64 IN

## 2017-11-28 PROBLEM — J18.9 PNEUMONIA OF RIGHT LOWER LOBE DUE TO INFECTIOUS ORGANISM: Status: RESOLVED | Noted: 2017-11-27 | Resolved: 2017-11-28

## 2017-11-28 PROBLEM — I50.31 ACUTE DIASTOLIC CONGESTIVE HEART FAILURE (HCC): Status: RESOLVED | Noted: 2017-11-17 | Resolved: 2017-11-28

## 2017-11-28 RX ORDER — NICOTINE 21 MG/24HR
1 PATCH, TRANSDERMAL 24 HOURS TRANSDERMAL DAILY
Qty: 28 PATCH | Refills: 0
Start: 2017-11-29 | End: 2018-01-01

## 2017-11-28 RX ORDER — LIDOCAINE 50 MG/G
1 PATCH TOPICAL DAILY
Qty: 30 PATCH | Refills: 0
Start: 2017-11-29 | End: 2018-01-01

## 2017-11-28 RX ADMIN — HEPARIN SODIUM 5000 UNITS: 5000 INJECTION, SOLUTION INTRAVENOUS; SUBCUTANEOUS at 05:01

## 2017-11-28 RX ADMIN — NICOTINE 14 MG: 14 PATCH TRANSDERMAL at 08:07

## 2017-11-28 RX ADMIN — LIDOCAINE 1 PATCH: 50 PATCH CUTANEOUS at 08:08

## 2017-11-28 RX ADMIN — ACETAMINOPHEN 975 MG: 325 TABLET ORAL at 03:37

## 2017-11-28 NOTE — DISCHARGE SUMMARY
Discharge Summary - [address] Internal Medicine    Patient Information: Karen Wise 80 y o  female MRN: [id number]  Unit/Bed#: E4 -01 Encounter: [id number]    Discharging Physician / Practitioner: Tracy Fletcher MD  PCP: Garfield Russell DO  Admission Date: 11/17/2017  Discharge Date: 11/28/17    Reason for Admission:  Weakness and somnolence    Discharge Diagnoses:     Principal Problem:    Dementia without behavioral disturbance  Active Problems:    Multiple fractures    Hypertension    Ambulatory dysfunction  Resolved Problems:    Acute diastolic congestive heart failure ([address] )    Pneumonia of right lower lobe due to infectious organism [facility])      Consultations During Hospital Stay:  · Geriatric Dr Lola Lares  · Cardiology Dr De Luna Major    Procedures Performed:     · None    Significant Findings:     · CT chest- right middle lobe and lower lobe consolidation concerning for pneumonia  Centrilobular emphysema  Multiple rib fractures involving the manubrium, sternum, coracoid process of the right shoulder, T2 vertebral body and right ribs    Incidental Findings:   · 2 mm left lower lobe nodule     Test Results Pending at Discharge (will require follow up): · None     Outpatient Tests Requested:  · None    Complications:  None    Hospital Course:     Karen Wise is a 80 y o  female patient who originally presented to the hospital on 11/17/2017 due to weakness and sleepiness  Her family and caregiver noted that she has had productive cough and diminished urine output prior to her admission  During her stay she spiked a fever of 101 4  CT of the chest confirmed right middle lobe and right lower lobe consolidation consistent with pneumonia  In addition she also had an elevated BNP  · Right lower lobe and right middle lobe pneumonia- the patient finished 7 days antibiotics  She was initially on ceftriaxone and Zithromax  She finished 3 days of Zithromax, and 7 days of ceftriaxone/cefuroxime  · Elevated BNP- this was precipitated by her pneumonia  Echocardiogram showed an EF of 55% without wall motion abnormality  Wall thickness was upper limits of normal   She did not require aggressive diuresis  Lasix was discontinued on her med rec  · Hypertension-blood pressure was stable off Norvasc  · Dementia-she remained on Namenda and Aricept  She was seen by geriatric service who [facility]  · Multiple fractures-due to falls and frailty  She had minimal pain  Lidoderm patch was placed on her lower back and she will be going to [place] [facility] for rehab    Condition at Discharge: good     Discharge Day Visit / Exam:     Subjective:  Poor historian due to dementia  She is concerned about her clothes  Vitals: Blood Pressure: 126/66 (11/28/17 0720)  Pulse: 76 (11/28/17 0720)  Temperature: 99 7 °F (37 6 °C) (11/28/17 0720)  Temp Source: Temporal (11/28/17 0720)  Respirations: 18 (11/28/17 0720)  Height: 5' 4" (162 6 cm) (11/17/17 1814)  Weight - Scale: 55 8 kg (123 lb 0 3 oz) (11/28/17 0600)  SpO2: 91 % (11/28/17 0720)  Exam:   Physical Exam   Constitutional: She appears well-developed  No distress  HENT:   Head: Normocephalic  Mouth/Throat: No oropharyngeal exudate  Eyes: No scleral icterus  Neck: No JVD present  Cardiovascular: Regular rhythm  Exam reveals no friction rub  No murmur heard  Pulmonary/Chest: Effort normal  No stridor  No respiratory distress  She has no wheezes  Abdominal: Soft  She exhibits no distension  There is no tenderness  Musculoskeletal: She exhibits no edema  Neurological: She is alert  Skin: Skin is warm and dry  She is not diaphoretic  Psychiatric: She has a normal mood and affect  Discharge instructions/Information to patient and family:   See after visit summary for information provided to patient and family        Provisions for Follow-Up Care:  See after visit summary for information related to follow-up care and any pertinent home health orders  Disposition:     Other [place] [facility] Readmission: none     Discharge Statement:  I spent >30 minutes discharging the patient  This time was spent on the day of discharge  I had direct contact with the patient on the day of discharge  Greater than 50% of the total time was spent examining patient, answering all patient questions, arranging and discussing plan of care with patient as well as directly providing post-discharge instructions  Additional time then spent on discharge activities  Spoke with Ghulam Miles, daughter in law and gave update  Discharge Medications:  See after visit summary for reconciled discharge medications provided to patient and family  ** Please Note: Dragon 360 Dictation voice to text software may have been used in the creation of this document   **

## 2017-11-28 NOTE — DISCHARGE INSTRUCTIONS
Fall precautions  Delirium precautions  Apply lidoderm to lower back  Lasix and norvasc discontinued   BP stable and volume status stable

## 2017-11-28 NOTE — NURSING NOTE
Report called to [facility], spoke to [employer]  IV removed previously by other nurse  Chart copy and AVS sent with transport  Patient did not have any personal items other then dentures sent with her

## 2017-12-01 ENCOUNTER — GENERIC CONVERSION - ENCOUNTER (OUTPATIENT)
Dept: OTHER | Facility: OTHER | Age: 82
End: 2017-12-01

## 2018-01-01 ENCOUNTER — HOSPITAL ENCOUNTER (EMERGENCY)
Facility: HOSPITAL | Age: 83
Discharge: NURSING, REHAB OR LONG-TERM CARE | End: 2018-07-10
Attending: EMERGENCY MEDICINE | Admitting: EMERGENCY MEDICINE
Payer: COMMERCIAL

## 2018-01-01 ENCOUNTER — IN HOME VISIT (OUTPATIENT)
Dept: GERIATRICS | Age: 83
End: 2018-01-01
Payer: COMMERCIAL

## 2018-01-01 ENCOUNTER — APPOINTMENT (OUTPATIENT)
Dept: RADIOLOGY | Facility: HOSPITAL | Age: 83
DRG: 689 | End: 2018-01-01
Payer: COMMERCIAL

## 2018-01-01 ENCOUNTER — HOSPITAL ENCOUNTER (INPATIENT)
Facility: HOSPITAL | Age: 83
LOS: 2 days | DRG: 689 | End: 2018-12-08
Attending: EMERGENCY MEDICINE | Admitting: INTERNAL MEDICINE
Payer: COMMERCIAL

## 2018-01-01 ENCOUNTER — APPOINTMENT (EMERGENCY)
Dept: RADIOLOGY | Facility: HOSPITAL | Age: 83
DRG: 689 | End: 2018-01-01
Payer: COMMERCIAL

## 2018-01-01 ENCOUNTER — HOSPITAL ENCOUNTER (EMERGENCY)
Facility: HOSPITAL | Age: 83
Discharge: HOME | End: 2018-08-28
Attending: EMERGENCY MEDICINE | Admitting: EMERGENCY MEDICINE
Payer: COMMERCIAL

## 2018-01-01 ENCOUNTER — APPOINTMENT (EMERGENCY)
Dept: RADIOLOGY | Facility: HOSPITAL | Age: 83
End: 2018-01-01
Payer: COMMERCIAL

## 2018-01-01 ENCOUNTER — HOSPITAL ENCOUNTER (EMERGENCY)
Facility: HOSPITAL | Age: 83
Discharge: HOME | End: 2018-04-05
Attending: EMERGENCY MEDICINE | Admitting: EMERGENCY MEDICINE
Payer: COMMERCIAL

## 2018-01-01 ENCOUNTER — GENERIC CONVERSION - ENCOUNTER (OUTPATIENT)
Dept: OTHER | Facility: OTHER | Age: 83
End: 2018-01-01

## 2018-01-01 VITALS
OXYGEN SATURATION: 95 % | HEIGHT: 66 IN | RESPIRATION RATE: 15 BRPM | WEIGHT: 111.6 LBS | BODY MASS INDEX: 17.94 KG/M2 | DIASTOLIC BLOOD PRESSURE: 62 MMHG | SYSTOLIC BLOOD PRESSURE: 128 MMHG | HEART RATE: 98 BPM | TEMPERATURE: 97.5 F

## 2018-01-01 VITALS
DIASTOLIC BLOOD PRESSURE: 78 MMHG | TEMPERATURE: 99.1 F | HEART RATE: 88 BPM | SYSTOLIC BLOOD PRESSURE: 135 MMHG | RESPIRATION RATE: 13 BRPM

## 2018-01-01 VITALS
OXYGEN SATURATION: 96 % | HEART RATE: 94 BPM | SYSTOLIC BLOOD PRESSURE: 144 MMHG | BODY MASS INDEX: 21.11 KG/M2 | DIASTOLIC BLOOD PRESSURE: 75 MMHG | WEIGHT: 123 LBS | TEMPERATURE: 97.7 F | RESPIRATION RATE: 24 BRPM

## 2018-01-01 VITALS
SYSTOLIC BLOOD PRESSURE: 110 MMHG | OXYGEN SATURATION: 96 % | HEART RATE: 74 BPM | TEMPERATURE: 98.1 F | RESPIRATION RATE: 17 BRPM | DIASTOLIC BLOOD PRESSURE: 60 MMHG

## 2018-01-01 VITALS
DIASTOLIC BLOOD PRESSURE: 65 MMHG | RESPIRATION RATE: 16 BRPM | SYSTOLIC BLOOD PRESSURE: 140 MMHG | TEMPERATURE: 98.2 F | HEART RATE: 88 BPM

## 2018-01-01 VITALS
DIASTOLIC BLOOD PRESSURE: 78 MMHG | RESPIRATION RATE: 18 BRPM | SYSTOLIC BLOOD PRESSURE: 163 MMHG | OXYGEN SATURATION: 95 % | HEART RATE: 87 BPM | WEIGHT: 123.02 LBS | TEMPERATURE: 97.4 F | HEIGHT: 64 IN | BODY MASS INDEX: 21 KG/M2

## 2018-01-01 VITALS
HEIGHT: 62 IN | RESPIRATION RATE: 20 BRPM | OXYGEN SATURATION: 95 % | HEART RATE: 71 BPM | SYSTOLIC BLOOD PRESSURE: 110 MMHG | TEMPERATURE: 97.7 F | DIASTOLIC BLOOD PRESSURE: 60 MMHG

## 2018-01-01 VITALS
TEMPERATURE: 97.4 F | SYSTOLIC BLOOD PRESSURE: 120 MMHG | HEIGHT: 62 IN | RESPIRATION RATE: 17 BRPM | HEART RATE: 63 BPM | WEIGHT: 137 LBS | DIASTOLIC BLOOD PRESSURE: 60 MMHG | OXYGEN SATURATION: 95 % | BODY MASS INDEX: 25.21 KG/M2

## 2018-01-01 VITALS — HEART RATE: 84 BPM | RESPIRATION RATE: 16 BRPM | TEMPERATURE: 97.6 F

## 2018-01-01 VITALS
HEART RATE: 90 BPM | RESPIRATION RATE: 18 BRPM | TEMPERATURE: 98.1 F | OXYGEN SATURATION: 97 % | SYSTOLIC BLOOD PRESSURE: 138 MMHG | DIASTOLIC BLOOD PRESSURE: 66 MMHG

## 2018-01-01 VITALS
OXYGEN SATURATION: 93 % | SYSTOLIC BLOOD PRESSURE: 120 MMHG | WEIGHT: 136 LBS | TEMPERATURE: 98.4 F | DIASTOLIC BLOOD PRESSURE: 60 MMHG | HEART RATE: 65 BPM | BODY MASS INDEX: 25.03 KG/M2 | HEIGHT: 62 IN

## 2018-01-01 DIAGNOSIS — I71.4 ABDOMINAL AORTIC ANEURYSM WITHOUT RUPTURE (HCC): ICD-10-CM

## 2018-01-01 DIAGNOSIS — F02.80 LATE ONSET ALZHEIMER'S DISEASE WITHOUT BEHAVIORAL DISTURBANCE (HCC): Primary | ICD-10-CM

## 2018-01-01 DIAGNOSIS — L24.5 IRRITANT CONTACT DERMATITIS DUE TO OTHER CHEMICAL PRODUCTS: Primary | ICD-10-CM

## 2018-01-01 DIAGNOSIS — G25.89 EXTRAPYRAMIDAL MOVEMENT DISORDER, DRUG-INDUCED: ICD-10-CM

## 2018-01-01 DIAGNOSIS — N17.9 AKI (ACUTE KIDNEY INJURY) (HCC): ICD-10-CM

## 2018-01-01 DIAGNOSIS — E86.0 MILD DEHYDRATION: Primary | ICD-10-CM

## 2018-01-01 DIAGNOSIS — R33.9 URINARY RETENTION: ICD-10-CM

## 2018-01-01 DIAGNOSIS — M35.3 PMR (POLYMYALGIA RHEUMATICA) (HCC): Primary | ICD-10-CM

## 2018-01-01 DIAGNOSIS — F02.80 LATE ONSET ALZHEIMER'S DISEASE WITHOUT BEHAVIORAL DISTURBANCE (HCC): ICD-10-CM
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
## 2018-01-01 DIAGNOSIS — E55.9 VITAMIN D DEFICIENCY: ICD-10-CM

## 2018-01-01 DIAGNOSIS — M35.3 PMR (POLYMYALGIA RHEUMATICA) (HCC): ICD-10-CM

## 2018-01-01 DIAGNOSIS — N30.00 ACUTE CYSTITIS WITHOUT HEMATURIA: ICD-10-CM

## 2018-01-01 DIAGNOSIS — G30.1 LATE ONSET ALZHEIMER'S DISEASE WITHOUT BEHAVIORAL DISTURBANCE (HCC): ICD-10-CM

## 2018-01-01 DIAGNOSIS — L24.5 IRRITANT CONTACT DERMATITIS DUE TO OTHER CHEMICAL PRODUCTS: ICD-10-CM

## 2018-01-01 DIAGNOSIS — S85.141D: Primary | ICD-10-CM

## 2018-01-01 DIAGNOSIS — T50.905A EXTRAPYRAMIDAL MOVEMENT DISORDER, DRUG-INDUCED: ICD-10-CM

## 2018-01-01 DIAGNOSIS — N39.0 UTI (URINARY TRACT INFECTION): Primary | ICD-10-CM

## 2018-01-01 DIAGNOSIS — F03.91 DEMENTIA WITH BEHAVIORAL DISTURBANCE, UNSPECIFIED DEMENTIA TYPE (HCC): Primary | ICD-10-CM

## 2018-01-01 DIAGNOSIS — R26.2 AMBULATORY DYSFUNCTION: ICD-10-CM

## 2018-01-01 DIAGNOSIS — N39.0 URINARY TRACT INFECTION: Primary | ICD-10-CM

## 2018-01-01 DIAGNOSIS — J20.9 ACUTE BRONCHITIS, UNSPECIFIED ORGANISM: ICD-10-CM

## 2018-01-01 DIAGNOSIS — G30.1 LATE ONSET ALZHEIMER'S DISEASE WITHOUT BEHAVIORAL DISTURBANCE (HCC): Primary | ICD-10-CM
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
## 2018-01-01 DIAGNOSIS — F02.81 LATE ONSET ALZHEIMER'S DISEASE WITH BEHAVIORAL DISTURBANCE (HCC): ICD-10-CM

## 2018-01-01 DIAGNOSIS — G30.1 LATE ONSET ALZHEIMER'S DISEASE WITH BEHAVIORAL DISTURBANCE (HCC): ICD-10-CM

## 2018-01-01 DIAGNOSIS — S81.811A LACERATION OF RIGHT LOWER EXTREMITY: Primary | ICD-10-CM

## 2018-01-01 DIAGNOSIS — G25.2 COARSE TREMORS: ICD-10-CM

## 2018-01-01 DIAGNOSIS — L08.9 LOCAL SKIN INFECTION: ICD-10-CM
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
## 2018-01-01 LAB
ALBUMIN SERPL BCP-MCNC: 2.5 G/DL (ref 3.5–5)
ALBUMIN SERPL BCP-MCNC: 2.6 G/DL (ref 3.5–5)
ALBUMIN SERPL BCP-MCNC: 2.7 G/DL (ref 3.5–5)
ALP SERPL-CCNC: 64 U/L (ref 46–116)
ALP SERPL-CCNC: 74 U/L (ref 46–116)
ALP SERPL-CCNC: 90 U/L (ref 46–116)
ALT SERPL W P-5'-P-CCNC: 17 U/L (ref 12–78)
ALT SERPL W P-5'-P-CCNC: 21 U/L (ref 12–78)
ALT SERPL W P-5'-P-CCNC: 25 U/L (ref 12–78)
ANION GAP SERPL CALCULATED.3IONS-SCNC: 4 MMOL/L (ref 4–13)
ANION GAP SERPL CALCULATED.3IONS-SCNC: 5 MMOL/L (ref 4–13)
ANION GAP SERPL CALCULATED.3IONS-SCNC: 6 MMOL/L (ref 4–13)
ANION GAP SERPL CALCULATED.3IONS-SCNC: 7 MMOL/L (ref 4–13)
ANION GAP SERPL CALCULATED.3IONS-SCNC: 7 MMOL/L (ref 4–13)
ANION GAP SERPL CALCULATED.3IONS-SCNC: 8 MMOL/L (ref 4–13)
ANION GAP SERPL CALCULATED.3IONS-SCNC: 8 MMOL/L (ref 4–13)
ANISOCYTOSIS BLD QL SMEAR: PRESENT
AST SERPL W P-5'-P-CCNC: 21 U/L (ref 5–45)
AST SERPL W P-5'-P-CCNC: 21 U/L (ref 5–45)
AST SERPL W P-5'-P-CCNC: 25 U/L (ref 5–45)
ATRIAL RATE: 89 BPM
ATRIAL RATE: 93 BPM
ATRIAL RATE: 95 BPM
BACTERIA UR CULT: ABNORMAL
BACTERIA UR QL AUTO: ABNORMAL /HPF
BASOPHILS # BLD AUTO: 0.02 THOUSANDS/ΜL (ref 0–0.1)
BASOPHILS # BLD AUTO: 0.02 THOUSANDS/ΜL (ref 0–0.1)
BASOPHILS # BLD AUTO: 0.04 THOUSANDS/ΜL (ref 0–0.1)
BASOPHILS # BLD AUTO: 0.04 THOUSANDS/ΜL (ref 0–0.1)
BASOPHILS # BLD AUTO: 0.05 THOUSANDS/ΜL (ref 0–0.1)
BASOPHILS # BLD AUTO: 0.05 THOUSANDS/ΜL (ref 0–0.1)
BASOPHILS # BLD MANUAL: 0 THOUSAND/UL (ref 0–0.1)
BASOPHILS NFR BLD AUTO: 0 % (ref 0–1)
BASOPHILS NFR MAR MANUAL: 0 % (ref 0–1)
BILIRUB SERPL-MCNC: 0.29 MG/DL (ref 0.2–1)
BILIRUB SERPL-MCNC: 0.4 MG/DL (ref 0.2–1)
BILIRUB SERPL-MCNC: 0.55 MG/DL (ref 0.2–1)
BILIRUB UR QL STRIP: NEGATIVE
BUN SERPL-MCNC: 33 MG/DL (ref 5–25)
BUN SERPL-MCNC: 35 MG/DL (ref 5–25)
BUN SERPL-MCNC: 36 MG/DL (ref 5–25)
BUN SERPL-MCNC: 38 MG/DL (ref 5–25)
BUN SERPL-MCNC: 39 MG/DL (ref 5–25)
BUN SERPL-MCNC: 47 MG/DL (ref 5–25)
BUN SERPL-MCNC: 53 MG/DL (ref 5–25)
CALCIUM SERPL-MCNC: 8.7 MG/DL (ref 8.3–10.1)
CALCIUM SERPL-MCNC: 8.8 MG/DL (ref 8.3–10.1)
CALCIUM SERPL-MCNC: 9 MG/DL (ref 8.3–10.1)
CALCIUM SERPL-MCNC: 9.2 MG/DL (ref 8.3–10.1)
CALCIUM SERPL-MCNC: 9.4 MG/DL (ref 8.3–10.1)
CHLORIDE SERPL-SCNC: 103 MMOL/L (ref 100–108)
CHLORIDE SERPL-SCNC: 103 MMOL/L (ref 100–108)
CHLORIDE SERPL-SCNC: 104 MMOL/L (ref 100–108)
CHLORIDE SERPL-SCNC: 106 MMOL/L (ref 100–108)
CHLORIDE SERPL-SCNC: 108 MMOL/L (ref 100–108)
CHLORIDE SERPL-SCNC: 109 MMOL/L (ref 100–108)
CHLORIDE SERPL-SCNC: 109 MMOL/L (ref 100–108)
CLARITY UR: ABNORMAL
CLARITY UR: CLEAR
CLARITY UR: CLEAR
CO2 SERPL-SCNC: 24 MMOL/L (ref 21–32)
CO2 SERPL-SCNC: 27 MMOL/L (ref 21–32)
CO2 SERPL-SCNC: 27 MMOL/L (ref 21–32)
CO2 SERPL-SCNC: 29 MMOL/L (ref 21–32)
CO2 SERPL-SCNC: 30 MMOL/L (ref 21–32)
COLOR UR: YELLOW
COLOR, POC: NORMAL
CREAT SERPL-MCNC: 0.91 MG/DL (ref 0.6–1.3)
CREAT SERPL-MCNC: 1 MG/DL (ref 0.6–1.3)
CREAT SERPL-MCNC: 1.07 MG/DL (ref 0.6–1.3)
CREAT SERPL-MCNC: 1.09 MG/DL (ref 0.6–1.3)
CREAT SERPL-MCNC: 1.1 MG/DL (ref 0.6–1.3)
CREAT SERPL-MCNC: 1.15 MG/DL (ref 0.6–1.3)
CREAT SERPL-MCNC: 1.32 MG/DL (ref 0.6–1.3)
EOSINOPHIL # BLD AUTO: 0 THOUSAND/ΜL (ref 0–0.61)
EOSINOPHIL # BLD AUTO: 0.02 THOUSAND/ΜL (ref 0–0.61)
EOSINOPHIL # BLD AUTO: 0.02 THOUSAND/ΜL (ref 0–0.61)
EOSINOPHIL # BLD AUTO: 0.06 THOUSAND/ΜL (ref 0–0.61)
EOSINOPHIL # BLD AUTO: 0.07 THOUSAND/ΜL (ref 0–0.61)
EOSINOPHIL # BLD AUTO: 0.09 THOUSAND/ΜL (ref 0–0.61)
EOSINOPHIL # BLD MANUAL: 0 THOUSAND/UL (ref 0–0.4)
EOSINOPHIL NFR BLD AUTO: 0 % (ref 0–6)
EOSINOPHIL NFR BLD AUTO: 1 % (ref 0–6)
EOSINOPHIL NFR BLD MANUAL: 0 % (ref 0–6)
ERYTHROCYTE [DISTWIDTH] IN BLOOD BY AUTOMATED COUNT: 16 % (ref 11.6–15.1)
ERYTHROCYTE [DISTWIDTH] IN BLOOD BY AUTOMATED COUNT: 16.2 % (ref 11.6–15.1)
ERYTHROCYTE [DISTWIDTH] IN BLOOD BY AUTOMATED COUNT: 16.2 % (ref 11.6–15.1)
ERYTHROCYTE [DISTWIDTH] IN BLOOD BY AUTOMATED COUNT: 16.3 % (ref 11.6–15.1)
ERYTHROCYTE [DISTWIDTH] IN BLOOD BY AUTOMATED COUNT: 16.4 % (ref 11.6–15.1)
ERYTHROCYTE [DISTWIDTH] IN BLOOD BY AUTOMATED COUNT: 17 % (ref 11.6–15.1)
ERYTHROCYTE [DISTWIDTH] IN BLOOD BY AUTOMATED COUNT: 17.3 % (ref 11.6–15.1)
EXT NITRITE, UA: NORMAL
GFR SERPL CREATININE-BSD FRML MDRD: 37 ML/MIN/1.73SQ M
GFR SERPL CREATININE-BSD FRML MDRD: 43 ML/MIN/1.73SQ M
GFR SERPL CREATININE-BSD FRML MDRD: 46 ML/MIN/1.73SQ M
GFR SERPL CREATININE-BSD FRML MDRD: 46 ML/MIN/1.73SQ M
GFR SERPL CREATININE-BSD FRML MDRD: 47 ML/MIN/1.73SQ M
GFR SERPL CREATININE-BSD FRML MDRD: 51 ML/MIN/1.73SQ M
GFR SERPL CREATININE-BSD FRML MDRD: 58 ML/MIN/1.73SQ M
GLUCOSE P FAST SERPL-MCNC: 90 MG/DL (ref 65–99)
GLUCOSE SERPL-MCNC: 102 MG/DL (ref 65–140)
GLUCOSE SERPL-MCNC: 121 MG/DL (ref 65–140)
GLUCOSE SERPL-MCNC: 155 MG/DL (ref 65–140)
GLUCOSE SERPL-MCNC: 88 MG/DL (ref 65–140)
GLUCOSE SERPL-MCNC: 90 MG/DL (ref 65–140)
GLUCOSE SERPL-MCNC: 92 MG/DL (ref 65–140)
GLUCOSE SERPL-MCNC: 95 MG/DL (ref 65–140)
GLUCOSE UR STRIP-MCNC: NEGATIVE MG/DL
HCT VFR BLD AUTO: 32.4 % (ref 34.8–46.1)
HCT VFR BLD AUTO: 34.7 % (ref 34.8–46.1)
HCT VFR BLD AUTO: 35.5 % (ref 34.8–46.1)
HCT VFR BLD AUTO: 36.2 % (ref 34.8–46.1)
HCT VFR BLD AUTO: 37.5 % (ref 34.8–46.1)
HCT VFR BLD AUTO: 38.4 % (ref 34.8–46.1)
HCT VFR BLD AUTO: 43.7 % (ref 34.8–46.1)
HGB BLD-MCNC: 10.4 G/DL (ref 11.5–15.4)
HGB BLD-MCNC: 11 G/DL (ref 11.5–15.4)
HGB BLD-MCNC: 11.1 G/DL (ref 11.5–15.4)
HGB BLD-MCNC: 11.5 G/DL (ref 11.5–15.4)
HGB BLD-MCNC: 11.6 G/DL (ref 11.5–15.4)
HGB BLD-MCNC: 12.9 G/DL (ref 11.5–15.4)
HGB BLD-MCNC: 9.7 G/DL (ref 11.5–15.4)
HGB UR QL STRIP.AUTO: ABNORMAL
HGB UR QL STRIP.AUTO: NEGATIVE
HGB UR QL STRIP.AUTO: NEGATIVE
HYALINE CASTS #/AREA URNS LPF: ABNORMAL /LPF
HYALINE CASTS #/AREA URNS LPF: ABNORMAL /LPF
IMM GRANULOCYTES # BLD AUTO: 0.04 THOUSAND/UL (ref 0–0.2)
IMM GRANULOCYTES # BLD AUTO: 0.05 THOUSAND/UL (ref 0–0.2)
IMM GRANULOCYTES # BLD AUTO: 0.1 THOUSAND/UL (ref 0–0.2)
IMM GRANULOCYTES # BLD AUTO: 0.1 THOUSAND/UL (ref 0–0.2)
IMM GRANULOCYTES # BLD AUTO: 0.12 THOUSAND/UL (ref 0–0.2)
IMM GRANULOCYTES NFR BLD AUTO: 0 % (ref 0–2)
IMM GRANULOCYTES NFR BLD AUTO: 0 % (ref 0–2)
IMM GRANULOCYTES NFR BLD AUTO: 1 % (ref 0–2)
KETONES UR STRIP-MCNC: NEGATIVE MG/DL
LEUKOCYTE ESTERASE UR QL STRIP: ABNORMAL
LEUKOCYTE ESTERASE UR QL STRIP: ABNORMAL
LEUKOCYTE ESTERASE UR QL STRIP: NEGATIVE
LIPASE SERPL-CCNC: 61 U/L (ref 73–393)
LYMPHOCYTES # BLD AUTO: 0.46 THOUSAND/UL (ref 0.6–4.47)
LYMPHOCYTES # BLD AUTO: 1.13 THOUSANDS/ΜL (ref 0.6–4.47)
LYMPHOCYTES # BLD AUTO: 1.25 THOUSANDS/ΜL (ref 0.6–4.47)
LYMPHOCYTES # BLD AUTO: 1.25 THOUSANDS/ΜL (ref 0.6–4.47)
LYMPHOCYTES # BLD AUTO: 1.35 THOUSANDS/ΜL (ref 0.6–4.47)
LYMPHOCYTES # BLD AUTO: 1.37 THOUSANDS/ΜL (ref 0.6–4.47)
LYMPHOCYTES # BLD AUTO: 1.51 THOUSANDS/ΜL (ref 0.6–4.47)
LYMPHOCYTES # BLD AUTO: 2 % (ref 14–44)
LYMPHOCYTES NFR BLD AUTO: 12 % (ref 14–44)
LYMPHOCYTES NFR BLD AUTO: 13 % (ref 14–44)
LYMPHOCYTES NFR BLD AUTO: 13 % (ref 14–44)
LYMPHOCYTES NFR BLD AUTO: 6 % (ref 14–44)
LYMPHOCYTES NFR BLD AUTO: 8 % (ref 14–44)
LYMPHOCYTES NFR BLD AUTO: 9 % (ref 14–44)
MAGNESIUM SERPL-MCNC: 2.5 MG/DL (ref 1.6–2.6)
MCH RBC QN AUTO: 28 PG (ref 26.8–34.3)
MCH RBC QN AUTO: 28 PG (ref 26.8–34.3)
MCH RBC QN AUTO: 28.9 PG (ref 26.8–34.3)
MCH RBC QN AUTO: 29 PG (ref 26.8–34.3)
MCH RBC QN AUTO: 29.4 PG (ref 26.8–34.3)
MCH RBC QN AUTO: 29.4 PG (ref 26.8–34.3)
MCH RBC QN AUTO: 29.6 PG (ref 26.8–34.3)
MCHC RBC AUTO-ENTMCNC: 29.3 G/DL (ref 31.4–37.4)
MCHC RBC AUTO-ENTMCNC: 29.5 G/DL (ref 31.4–37.4)
MCHC RBC AUTO-ENTMCNC: 29.9 G/DL (ref 31.4–37.4)
MCHC RBC AUTO-ENTMCNC: 29.9 G/DL (ref 31.4–37.4)
MCHC RBC AUTO-ENTMCNC: 30 G/DL (ref 31.4–37.4)
MCHC RBC AUTO-ENTMCNC: 31.3 G/DL (ref 31.4–37.4)
MCHC RBC AUTO-ENTMCNC: 32 G/DL (ref 31.4–37.4)
MCV RBC AUTO: 100 FL (ref 82–98)
MCV RBC AUTO: 101 FL (ref 82–98)
MCV RBC AUTO: 87 FL (ref 82–98)
MCV RBC AUTO: 94 FL (ref 82–98)
MCV RBC AUTO: 94 FL (ref 82–98)
MCV RBC AUTO: 97 FL (ref 82–98)
MCV RBC AUTO: 97 FL (ref 82–98)
MONOCYTES # BLD AUTO: 0.69 THOUSAND/UL (ref 0–1.22)
MONOCYTES # BLD AUTO: 0.7 THOUSAND/ΜL (ref 0.17–1.22)
MONOCYTES # BLD AUTO: 0.7 THOUSAND/ΜL (ref 0.17–1.22)
MONOCYTES # BLD AUTO: 0.78 THOUSAND/ΜL (ref 0.17–1.22)
MONOCYTES # BLD AUTO: 0.8 THOUSAND/ΜL (ref 0.17–1.22)
MONOCYTES # BLD AUTO: 0.9 THOUSAND/ΜL (ref 0.17–1.22)
MONOCYTES # BLD AUTO: 0.96 THOUSAND/ΜL (ref 0.17–1.22)
MONOCYTES NFR BLD AUTO: 5 % (ref 4–12)
MONOCYTES NFR BLD AUTO: 5 % (ref 4–12)
MONOCYTES NFR BLD AUTO: 6 % (ref 4–12)
MONOCYTES NFR BLD AUTO: 6 % (ref 4–12)
MONOCYTES NFR BLD AUTO: 7 % (ref 4–12)
MONOCYTES NFR BLD AUTO: 8 % (ref 4–12)
MONOCYTES NFR BLD: 3 % (ref 4–12)
NEUTROPHILS # BLD AUTO: 12.11 THOUSANDS/ΜL (ref 1.85–7.62)
NEUTROPHILS # BLD AUTO: 13.49 THOUSANDS/ΜL (ref 1.85–7.62)
NEUTROPHILS # BLD AUTO: 16.9 THOUSANDS/ΜL (ref 1.85–7.62)
NEUTROPHILS # BLD AUTO: 8.05 THOUSANDS/ΜL (ref 1.85–7.62)
NEUTROPHILS # BLD AUTO: 8.98 THOUSANDS/ΜL (ref 1.85–7.62)
NEUTROPHILS # BLD AUTO: 9.64 THOUSANDS/ΜL (ref 1.85–7.62)
NEUTROPHILS # BLD MANUAL: 21.96 THOUSAND/UL (ref 1.85–7.62)
NEUTS BAND NFR BLD MANUAL: 9 % (ref 0–8)
NEUTS SEG NFR BLD AUTO: 78 % (ref 43–75)
NEUTS SEG NFR BLD AUTO: 80 % (ref 43–75)
NEUTS SEG NFR BLD AUTO: 80 % (ref 43–75)
NEUTS SEG NFR BLD AUTO: 84 % (ref 43–75)
NEUTS SEG NFR BLD AUTO: 86 % (ref 43–75)
NEUTS SEG NFR BLD AUTO: 86 % (ref 43–75)
NEUTS SEG NFR BLD AUTO: 88 % (ref 43–75)
NITRITE UR QL STRIP: NEGATIVE
NITRITE UR QL STRIP: NEGATIVE
NITRITE UR QL STRIP: POSITIVE
NON-SQ EPI CELLS URNS QL MICRO: ABNORMAL /HPF
NRBC BLD AUTO-RTO: 0 /100 WBCS
NT-PROBNP SERPL-MCNC: 9919 PG/ML
P AXIS: 53 DEGREES
P AXIS: 60 DEGREES
P AXIS: 78 DEGREES
PH UR STRIP.AUTO: 5.5 [PH] (ref 4.5–8)
PH UR STRIP.AUTO: 7 [PH] (ref 4.5–8)
PH UR STRIP.AUTO: 7 [PH] (ref 4.5–8)
PHOSPHATE SERPL-MCNC: 2.9 MG/DL (ref 2.3–4.1)
PLATELET # BLD AUTO: 178 THOUSANDS/UL (ref 149–390)
PLATELET # BLD AUTO: 235 THOUSANDS/UL (ref 149–390)
PLATELET # BLD AUTO: 251 THOUSANDS/UL (ref 149–390)
PLATELET # BLD AUTO: 256 THOUSANDS/UL (ref 149–390)
PLATELET # BLD AUTO: 275 THOUSANDS/UL (ref 149–390)
PLATELET # BLD AUTO: 277 THOUSANDS/UL (ref 149–390)
PLATELET # BLD AUTO: 285 THOUSANDS/UL (ref 149–390)
PLATELET # BLD AUTO: 290 THOUSANDS/UL (ref 149–390)
PLATELET BLD QL SMEAR: ADEQUATE
PMV BLD AUTO: 10.2 FL (ref 8.9–12.7)
PMV BLD AUTO: 11.2 FL (ref 8.9–12.7)
PMV BLD AUTO: 11.6 FL (ref 8.9–12.7)
PMV BLD AUTO: 9.1 FL (ref 8.9–12.7)
PMV BLD AUTO: 9.2 FL (ref 8.9–12.7)
PMV BLD AUTO: 9.4 FL (ref 8.9–12.7)
PMV BLD AUTO: 9.7 FL (ref 8.9–12.7)
PMV BLD AUTO: 9.7 FL (ref 8.9–12.7)
POLYCHROMASIA BLD QL SMEAR: PRESENT
POTASSIUM SERPL-SCNC: 3.6 MMOL/L (ref 3.5–5.3)
POTASSIUM SERPL-SCNC: 3.8 MMOL/L (ref 3.5–5.3)
POTASSIUM SERPL-SCNC: 3.8 MMOL/L (ref 3.5–5.3)
POTASSIUM SERPL-SCNC: 3.9 MMOL/L (ref 3.5–5.3)
POTASSIUM SERPL-SCNC: 4.3 MMOL/L (ref 3.5–5.3)
POTASSIUM SERPL-SCNC: 4.4 MMOL/L (ref 3.5–5.3)
POTASSIUM SERPL-SCNC: 4.7 MMOL/L (ref 3.5–5.3)
PR INTERVAL: 144 MS
PR INTERVAL: 148 MS
PR INTERVAL: 150 MS
PROCALCITONIN SERPL-MCNC: 12.79 NG/ML
PROCALCITONIN SERPL-MCNC: 2.78 NG/ML
PROCALCITONIN SERPL-MCNC: 23.81 NG/ML
PROCALCITONIN SERPL-MCNC: 25.97 NG/ML
PROCALCITONIN SERPL-MCNC: 6.23 NG/ML
PROT SERPL-MCNC: 6.1 G/DL (ref 6.4–8.2)
PROT SERPL-MCNC: 6.1 G/DL (ref 6.4–8.2)
PROT SERPL-MCNC: 6.6 G/DL (ref 6.4–8.2)
PROT UR STRIP-MCNC: ABNORMAL MG/DL
QRS AXIS: -45 DEGREES
QRS AXIS: -49 DEGREES
QRS AXIS: -55 DEGREES
QRSD INTERVAL: 100 MS
QRSD INTERVAL: 96 MS
QRSD INTERVAL: 98 MS
QT INTERVAL: 330 MS
QT INTERVAL: 334 MS
QT INTERVAL: 366 MS
QTC INTERVAL: 401 MS
QTC INTERVAL: 419 MS
QTC INTERVAL: 455 MS
RBC # BLD AUTO: 3.46 MILLION/UL (ref 3.81–5.12)
RBC # BLD AUTO: 3.59 MILLION/UL (ref 3.81–5.12)
RBC # BLD AUTO: 3.71 MILLION/UL (ref 3.81–5.12)
RBC # BLD AUTO: 3.78 MILLION/UL (ref 3.81–5.12)
RBC # BLD AUTO: 3.98 MILLION/UL (ref 3.81–5.12)
RBC # BLD AUTO: 4.14 MILLION/UL (ref 3.81–5.12)
RBC # BLD AUTO: 4.39 MILLION/UL (ref 3.81–5.12)
RBC #/AREA URNS AUTO: ABNORMAL /HPF
RBC MORPH BLD: PRESENT
SODIUM SERPL-SCNC: 136 MMOL/L (ref 136–145)
SODIUM SERPL-SCNC: 137 MMOL/L (ref 136–145)
SODIUM SERPL-SCNC: 138 MMOL/L (ref 136–145)
SODIUM SERPL-SCNC: 138 MMOL/L (ref 136–145)
SODIUM SERPL-SCNC: 139 MMOL/L (ref 136–145)
SODIUM SERPL-SCNC: 140 MMOL/L (ref 136–145)
SODIUM SERPL-SCNC: 144 MMOL/L (ref 136–145)
SP GR UR STRIP.AUTO: 1.01 (ref 1–1.03)
SP GR UR STRIP.AUTO: 1.02 (ref 1–1.03)
SP GR UR STRIP.AUTO: >=1.03 (ref 1–1.03)
T WAVE AXIS: 119 DEGREES
T WAVE AXIS: 76 DEGREES
T WAVE AXIS: 94 DEGREES
TROPONIN I SERPL-MCNC: 0.04 NG/ML
TROPONIN I SERPL-MCNC: 0.05 NG/ML
TSH SERPL DL<=0.05 MIU/L-ACNC: 2.24 UIU/ML (ref 0.36–3.74)
UROBILINOGEN UR QL STRIP.AUTO: 0.2 E.U./DL
UROBILINOGEN UR QL STRIP.AUTO: 0.2 E.U./DL
UROBILINOGEN UR QL STRIP.AUTO: 1 E.U./DL
VENTRICULAR RATE: 89 BPM
VENTRICULAR RATE: 93 BPM
VENTRICULAR RATE: 95 BPM
WBC # BLD AUTO: 10.33 THOUSAND/UL (ref 4.31–10.16)
WBC # BLD AUTO: 11.37 THOUSAND/UL (ref 4.31–10.16)
WBC # BLD AUTO: 11.94 THOUSAND/UL (ref 4.31–10.16)
WBC # BLD AUTO: 14.38 THOUSAND/UL (ref 4.31–10.16)
WBC # BLD AUTO: 15.61 THOUSAND/UL (ref 4.31–10.16)
WBC # BLD AUTO: 19.17 THOUSAND/UL (ref 4.31–10.16)
WBC # BLD AUTO: 23.12 THOUSAND/UL (ref 4.31–10.16)
WBC #/AREA URNS AUTO: ABNORMAL /HPF

## 2018-01-01 PROCEDURE — 84443 ASSAY THYROID STIM HORMONE: CPT | Performed by: INTERNAL MEDICINE

## 2018-01-01 PROCEDURE — 85025 COMPLETE CBC W/AUTO DIFF WBC: CPT | Performed by: PHYSICIAN ASSISTANT

## 2018-01-01 PROCEDURE — 93005 ELECTROCARDIOGRAM TRACING: CPT

## 2018-01-01 PROCEDURE — 84145 PROCALCITONIN (PCT): CPT | Performed by: PHYSICIAN ASSISTANT

## 2018-01-01 PROCEDURE — 99285 EMERGENCY DEPT VISIT HI MDM: CPT

## 2018-01-01 PROCEDURE — 80048 BASIC METABOLIC PNL TOTAL CA: CPT | Performed by: EMERGENCY MEDICINE

## 2018-01-01 PROCEDURE — 99336 PR DOM/R-HOME E/M EST PT MOD HI SEVERITY 40 MINUTES: CPT | Performed by: FAMILY MEDICINE

## 2018-01-01 PROCEDURE — G8988 SELF CARE GOAL STATUS: HCPCS

## 2018-01-01 PROCEDURE — 99232 SBSQ HOSP IP/OBS MODERATE 35: CPT | Performed by: INTERNAL MEDICINE

## 2018-01-01 PROCEDURE — 36415 COLL VENOUS BLD VENIPUNCTURE: CPT | Performed by: EMERGENCY MEDICINE

## 2018-01-01 PROCEDURE — 83735 ASSAY OF MAGNESIUM: CPT | Performed by: INTERNAL MEDICINE

## 2018-01-01 PROCEDURE — 99284 EMERGENCY DEPT VISIT MOD MDM: CPT

## 2018-01-01 PROCEDURE — 84484 ASSAY OF TROPONIN QUANT: CPT | Performed by: INTERNAL MEDICINE

## 2018-01-01 PROCEDURE — 99239 HOSP IP/OBS DSCHRG MGMT >30: CPT | Performed by: NURSE PRACTITIONER

## 2018-01-01 PROCEDURE — 85025 COMPLETE CBC W/AUTO DIFF WBC: CPT | Performed by: EMERGENCY MEDICINE

## 2018-01-01 PROCEDURE — 87077 CULTURE AEROBIC IDENTIFY: CPT

## 2018-01-01 PROCEDURE — 73590 X-RAY EXAM OF LOWER LEG: CPT

## 2018-01-01 PROCEDURE — 80053 COMPREHEN METABOLIC PANEL: CPT | Performed by: EMERGENCY MEDICINE

## 2018-01-01 PROCEDURE — 99223 1ST HOSP IP/OBS HIGH 75: CPT | Performed by: INTERNAL MEDICINE

## 2018-01-01 PROCEDURE — 99232 SBSQ HOSP IP/OBS MODERATE 35: CPT | Performed by: PHYSICIAN ASSISTANT

## 2018-01-01 PROCEDURE — 99283 EMERGENCY DEPT VISIT LOW MDM: CPT

## 2018-01-01 PROCEDURE — 97163 PT EVAL HIGH COMPLEX 45 MIN: CPT

## 2018-01-01 PROCEDURE — 71045 X-RAY EXAM CHEST 1 VIEW: CPT

## 2018-01-01 PROCEDURE — 81002 URINALYSIS NONAUTO W/O SCOPE: CPT | Performed by: EMERGENCY MEDICINE

## 2018-01-01 PROCEDURE — 99220 PR INITIAL OBSERVATION CARE/DAY 70 MINUTES: CPT | Performed by: INTERNAL MEDICINE

## 2018-01-01 PROCEDURE — 99335 PR DOM/R-HOME E/M EST PT LW MOD SEVERITY 25 MINUTES: CPT | Performed by: FAMILY MEDICINE

## 2018-01-01 PROCEDURE — 87040 BLOOD CULTURE FOR BACTERIA: CPT | Performed by: INTERNAL MEDICINE

## 2018-01-01 PROCEDURE — 80048 BASIC METABOLIC PNL TOTAL CA: CPT | Performed by: PHYSICIAN ASSISTANT

## 2018-01-01 PROCEDURE — 85025 COMPLETE CBC W/AUTO DIFF WBC: CPT | Performed by: INTERNAL MEDICINE

## 2018-01-01 PROCEDURE — 80053 COMPREHEN METABOLIC PANEL: CPT | Performed by: INTERNAL MEDICINE

## 2018-01-01 PROCEDURE — 99205 OFFICE O/P NEW HI 60 MIN: CPT | Performed by: INTERNAL MEDICINE

## 2018-01-01 PROCEDURE — 70450 CT HEAD/BRAIN W/O DYE: CPT

## 2018-01-01 PROCEDURE — G8978 MOBILITY CURRENT STATUS: HCPCS

## 2018-01-01 PROCEDURE — G8987 SELF CARE CURRENT STATUS: HCPCS

## 2018-01-01 PROCEDURE — 96365 THER/PROPH/DIAG IV INF INIT: CPT

## 2018-01-01 PROCEDURE — 97167 OT EVAL HIGH COMPLEX 60 MIN: CPT

## 2018-01-01 PROCEDURE — 96360 HYDRATION IV INFUSION INIT: CPT

## 2018-01-01 PROCEDURE — 99254 IP/OBS CNSLTJ NEW/EST MOD 60: CPT | Performed by: FAMILY MEDICINE

## 2018-01-01 PROCEDURE — 87086 URINE CULTURE/COLONY COUNT: CPT

## 2018-01-01 PROCEDURE — G8979 MOBILITY GOAL STATUS: HCPCS

## 2018-01-01 PROCEDURE — 81001 URINALYSIS AUTO W/SCOPE: CPT

## 2018-01-01 PROCEDURE — 84100 ASSAY OF PHOSPHORUS: CPT | Performed by: INTERNAL MEDICINE

## 2018-01-01 PROCEDURE — 90715 TDAP VACCINE 7 YRS/> IM: CPT | Performed by: EMERGENCY MEDICINE

## 2018-01-01 PROCEDURE — 93010 ELECTROCARDIOGRAM REPORT: CPT | Performed by: INTERNAL MEDICINE

## 2018-01-01 PROCEDURE — 87186 SC STD MICRODIL/AGAR DIL: CPT

## 2018-01-01 PROCEDURE — 99219 PR INITIAL OBSERVATION CARE/DAY 50 MINUTES: CPT | Performed by: PHYSICIAN ASSISTANT

## 2018-01-01 PROCEDURE — 83880 ASSAY OF NATRIURETIC PEPTIDE: CPT | Performed by: INTERNAL MEDICINE

## 2018-01-01 PROCEDURE — 99225 PR SBSQ OBSERVATION CARE/DAY 25 MINUTES: CPT | Performed by: PHYSICIAN ASSISTANT

## 2018-01-01 PROCEDURE — 99233 SBSQ HOSP IP/OBS HIGH 50: CPT | Performed by: INTERNAL MEDICINE

## 2018-01-01 PROCEDURE — 84145 PROCALCITONIN (PCT): CPT | Performed by: INTERNAL MEDICINE

## 2018-01-01 PROCEDURE — 85049 AUTOMATED PLATELET COUNT: CPT | Performed by: INTERNAL MEDICINE

## 2018-01-01 PROCEDURE — 84484 ASSAY OF TROPONIN QUANT: CPT | Performed by: EMERGENCY MEDICINE

## 2018-01-01 PROCEDURE — 90471 IMMUNIZATION ADMIN: CPT

## 2018-01-01 PROCEDURE — 85027 COMPLETE CBC AUTOMATED: CPT | Performed by: EMERGENCY MEDICINE

## 2018-01-01 PROCEDURE — 76770 US EXAM ABDO BACK WALL COMP: CPT

## 2018-01-01 PROCEDURE — 85007 BL SMEAR W/DIFF WBC COUNT: CPT | Performed by: EMERGENCY MEDICINE

## 2018-01-01 PROCEDURE — 83690 ASSAY OF LIPASE: CPT | Performed by: EMERGENCY MEDICINE

## 2018-01-01 RX ORDER — WHITE PETROLATUM,ZINC OXIDE 57; 17 G/100G; G/100G
PASTE TOPICAL
Qty: 113 G | Refills: 11 | Status: SHIPPED | OUTPATIENT
Start: 2018-01-01 | End: 2018-12-09 | Stop reason: HOSPADM

## 2018-01-01 RX ORDER — DOCUSATE SODIUM 100 MG/1
100 CAPSULE, LIQUID FILLED ORAL 2 TIMES DAILY
Status: DISCONTINUED | OUTPATIENT
Start: 2018-01-01 | End: 2018-01-01

## 2018-01-01 RX ORDER — BACITRACIN, NEOMYCIN, POLYMYXIN B 400; 3.5; 5 [USP'U]/G; MG/G; [USP'U]/G
1 OINTMENT TOPICAL ONCE
Status: COMPLETED | OUTPATIENT
Start: 2018-01-01 | End: 2018-01-01

## 2018-01-01 RX ORDER — PROPRANOLOL HYDROCHLORIDE 20 MG/1
20 TABLET ORAL EVERY 12 HOURS SCHEDULED
Status: DISCONTINUED | OUTPATIENT
Start: 2018-01-01 | End: 2018-01-01

## 2018-01-01 RX ORDER — ONDANSETRON 2 MG/ML
4 INJECTION INTRAMUSCULAR; INTRAVENOUS EVERY 6 HOURS PRN
Status: DISCONTINUED | OUTPATIENT
Start: 2018-01-01 | End: 2018-12-09 | Stop reason: HOSPADM

## 2018-01-01 RX ORDER — CHOLECALCIFEROL (VITAMIN D3) 125 MCG
50000 TABLET ORAL
COMMUNITY
End: 2018-01-01

## 2018-01-01 RX ORDER — QUETIAPINE FUMARATE 25 MG/1
12.5 TABLET, FILM COATED ORAL
Qty: 15 TABLET | Refills: 5 | Status: SHIPPED | OUTPATIENT
Start: 2018-01-01 | End: 2018-01-01

## 2018-01-01 RX ORDER — LORAZEPAM 2 MG/ML
0.5 INJECTION INTRAMUSCULAR EVERY 4 HOURS PRN
Status: DISCONTINUED | OUTPATIENT
Start: 2018-01-01 | End: 2018-12-09 | Stop reason: HOSPADM

## 2018-01-01 RX ORDER — PREDNISONE 10 MG/1
10 TABLET ORAL DAILY
Status: DISCONTINUED | OUTPATIENT
Start: 2018-01-01 | End: 2018-01-01

## 2018-01-01 RX ORDER — QUETIAPINE FUMARATE 25 MG/1
25 TABLET, FILM COATED ORAL 2 TIMES DAILY
COMMUNITY
End: 2018-01-01 | Stop reason: ALTCHOICE

## 2018-01-01 RX ORDER — PREDNISONE 1 MG/1
5 TABLET ORAL DAILY
Status: DISCONTINUED | OUTPATIENT
Start: 2018-01-01 | End: 2018-01-01

## 2018-01-01 RX ORDER — PREDNISONE 10 MG/1
10 TABLET ORAL DAILY
Qty: 30 TABLET | Refills: 1 | Status: SHIPPED | OUTPATIENT
Start: 2018-01-01 | End: 2018-01-01 | Stop reason: SDUPTHER

## 2018-01-01 RX ORDER — ERGOCALCIFEROL 1.25 MG/1
50000 CAPSULE ORAL
Status: DISCONTINUED | OUTPATIENT
Start: 2018-12-31 | End: 2018-01-01

## 2018-01-01 RX ORDER — DIVALPROEX SODIUM 125 MG/1
125 CAPSULE, COATED PELLETS ORAL
Status: DISCONTINUED | OUTPATIENT
Start: 2018-01-01 | End: 2018-01-01

## 2018-01-01 RX ORDER — LIDOCAINE HYDROCHLORIDE AND EPINEPHRINE 10; 10 MG/ML; UG/ML
10 INJECTION, SOLUTION INFILTRATION; PERINEURAL ONCE
Status: COMPLETED | OUTPATIENT
Start: 2018-01-01 | End: 2018-01-01

## 2018-01-01 RX ORDER — CEPHALEXIN 500 MG/1
500 CAPSULE ORAL EVERY 12 HOURS SCHEDULED
Qty: 14 CAPSULE | Refills: 0 | Status: SHIPPED | OUTPATIENT
Start: 2018-01-01 | End: 2018-01-01 | Stop reason: ALTCHOICE

## 2018-01-01 RX ORDER — PREDNISONE 10 MG/1
TABLET ORAL
Qty: 42 TABLET | Refills: 1 | Status: SHIPPED | OUTPATIENT
Start: 2018-01-01 | End: 2018-01-01 | Stop reason: SDUPTHER

## 2018-01-01 RX ORDER — DEXTROSE AND SODIUM CHLORIDE 5; .9 G/100ML; G/100ML
50 INJECTION, SOLUTION INTRAVENOUS CONTINUOUS
Status: DISCONTINUED | OUTPATIENT
Start: 2018-01-01 | End: 2018-01-01

## 2018-01-01 RX ORDER — ACETAMINOPHEN 325 MG/1
975 TABLET ORAL EVERY 8 HOURS SCHEDULED
Status: DISCONTINUED | OUTPATIENT
Start: 2018-01-01 | End: 2018-01-01

## 2018-01-01 RX ORDER — PREDNISONE 10 MG/1
TABLET ORAL
Qty: 42 TABLET | Refills: 1 | Status: SHIPPED | OUTPATIENT
Start: 2018-01-01 | End: 2018-01-01

## 2018-01-01 RX ORDER — ACETAMINOPHEN 325 MG/1
650 TABLET ORAL EVERY 8 HOURS SCHEDULED
Status: DISCONTINUED | OUTPATIENT
Start: 2018-01-01 | End: 2018-01-01

## 2018-01-01 RX ORDER — CEPHALEXIN 250 MG/1
500 CAPSULE ORAL ONCE
Status: COMPLETED | OUTPATIENT
Start: 2018-01-01 | End: 2018-01-01

## 2018-01-01 RX ORDER — PROPRANOLOL HYDROCHLORIDE 20 MG/1
20 TABLET ORAL EVERY 12 HOURS SCHEDULED
Qty: 56 TABLET | Refills: 5 | Status: SHIPPED | OUTPATIENT
Start: 2018-01-01 | End: 2018-12-09 | Stop reason: HOSPADM

## 2018-01-01 RX ORDER — LEVOFLOXACIN 250 MG/1
250 TABLET ORAL DAILY
Qty: 7 TABLET | Refills: 0 | Status: SHIPPED | OUTPATIENT
Start: 2018-01-01 | End: 2018-01-01

## 2018-01-01 RX ORDER — SENNOSIDES 8.6 MG
1 TABLET ORAL DAILY
Status: DISCONTINUED | OUTPATIENT
Start: 2018-01-01 | End: 2018-01-01

## 2018-01-01 RX ORDER — WHITE PETROLATUM,ZINC OXIDE 57; 17 G/100G; G/100G
PASTE TOPICAL
Qty: 113 G | Refills: 5 | Status: SHIPPED | OUTPATIENT
Start: 2018-01-01 | End: 2018-01-01 | Stop reason: SDUPTHER

## 2018-01-01 RX ORDER — MAGNESIUM HYDROXIDE/ALUMINUM HYDROXICE/SIMETHICONE 120; 1200; 1200 MG/30ML; MG/30ML; MG/30ML
30 SUSPENSION ORAL EVERY 6 HOURS PRN
Status: DISCONTINUED | OUTPATIENT
Start: 2018-01-01 | End: 2018-12-09 | Stop reason: HOSPADM

## 2018-01-01 RX ORDER — QUETIAPINE FUMARATE 25 MG/1
12.5 TABLET, FILM COATED ORAL
COMMUNITY
End: 2018-01-01

## 2018-01-01 RX ORDER — ACETAMINOPHEN 325 MG/1
650 TABLET ORAL EVERY 6 HOURS PRN
Status: DISCONTINUED | OUTPATIENT
Start: 2018-01-01 | End: 2018-01-01

## 2018-01-01 RX ORDER — DIVALPROEX SODIUM 125 MG/1
125 CAPSULE, COATED PELLETS ORAL
Qty: 28 CAPSULE | Refills: 5 | Status: SHIPPED | OUTPATIENT
Start: 2018-01-01 | End: 2018-12-09 | Stop reason: HOSPADM

## 2018-01-01 RX ORDER — QUETIAPINE FUMARATE 25 MG/1
TABLET, FILM COATED ORAL
Qty: 30 TABLET | Refills: 5 | Status: SHIPPED | OUTPATIENT
Start: 2018-01-01 | End: 2018-01-01

## 2018-01-01 RX ORDER — PREDNISONE 10 MG/1
10 TABLET ORAL DAILY
Qty: 28 TABLET | Refills: 2 | Status: SHIPPED | OUTPATIENT
Start: 2018-01-01 | End: 2018-12-09 | Stop reason: HOSPADM

## 2018-01-01 RX ORDER — CEPHALEXIN 500 MG/1
500 CAPSULE ORAL EVERY 12 HOURS SCHEDULED
Qty: 20 CAPSULE | Refills: 0 | Status: SHIPPED | OUTPATIENT
Start: 2018-01-01 | End: 2018-01-01

## 2018-01-01 RX ORDER — ERGOCALCIFEROL 1.25 MG/1
CAPSULE ORAL
Qty: 2 CAPSULE | Refills: 11 | Status: SHIPPED | OUTPATIENT
Start: 2018-01-01 | End: 2018-12-09 | Stop reason: HOSPADM

## 2018-01-01 RX ORDER — PREDNISONE 10 MG/1
10 TABLET ORAL DAILY
COMMUNITY
End: 2018-01-01 | Stop reason: SDUPTHER

## 2018-01-01 RX ORDER — FUROSEMIDE 10 MG/ML
20 INJECTION INTRAMUSCULAR; INTRAVENOUS
Status: DISCONTINUED | OUTPATIENT
Start: 2018-01-01 | End: 2018-01-01

## 2018-01-01 RX ADMIN — PROPRANOLOL HYDROCHLORIDE 20 MG: 20 TABLET ORAL at 21:58

## 2018-01-01 RX ADMIN — ENOXAPARIN SODIUM 30 MG: 30 INJECTION SUBCUTANEOUS at 09:01

## 2018-01-01 RX ADMIN — PREDNISONE 10 MG: 10 TABLET ORAL at 09:13

## 2018-01-01 RX ADMIN — PROPRANOLOL HYDROCHLORIDE 20 MG: 20 TABLET ORAL at 22:43

## 2018-01-01 RX ADMIN — DOCUSATE SODIUM 100 MG: 100 CAPSULE, LIQUID FILLED ORAL at 08:57

## 2018-01-01 RX ADMIN — PREDNISONE 10 MG: 10 TABLET ORAL at 14:54

## 2018-01-01 RX ADMIN — BACITRACIN, NEOMYCIN, POLYMYXIN B 1 SMALL APPLICATION: 400; 3.5; 5 OINTMENT TOPICAL at 01:09

## 2018-01-01 RX ADMIN — DEXTROSE AND SODIUM CHLORIDE 50 ML/HR: 5; .9 INJECTION, SOLUTION INTRAVENOUS at 22:03

## 2018-01-01 RX ADMIN — ACETAMINOPHEN 650 MG: 325 TABLET, FILM COATED ORAL at 00:23

## 2018-01-01 RX ADMIN — SODIUM CHLORIDE 1000 ML: 0.9 INJECTION, SOLUTION INTRAVENOUS at 15:20

## 2018-01-01 RX ADMIN — PROPRANOLOL HYDROCHLORIDE 20 MG: 20 TABLET ORAL at 14:53

## 2018-01-01 RX ADMIN — LIDOCAINE HYDROCHLORIDE AND EPINEPHRINE 10 ML: 10; 10 INJECTION, SOLUTION INFILTRATION; PERINEURAL at 23:41

## 2018-01-01 RX ADMIN — ACETAMINOPHEN 975 MG: 325 TABLET, FILM COATED ORAL at 14:44

## 2018-01-01 RX ADMIN — ERTAPENEM SODIUM 1000 MG: 1 INJECTION, POWDER, LYOPHILIZED, FOR SOLUTION INTRAMUSCULAR; INTRAVENOUS at 10:55

## 2018-01-01 RX ADMIN — TETANUS TOXOID, REDUCED DIPHTHERIA TOXOID AND ACELLULAR PERTUSSIS VACCINE, ADSORBED 0.5 ML: 5; 2.5; 8; 8; 2.5 SUSPENSION INTRAMUSCULAR at 23:40

## 2018-01-01 RX ADMIN — CEPHALEXIN 500 MG: 250 CAPSULE ORAL at 23:11

## 2018-01-01 RX ADMIN — PREDNISONE 5 MG: 5 TABLET ORAL at 09:01

## 2018-01-01 RX ADMIN — MORPHINE SULFATE 2 MG: 2 INJECTION, SOLUTION INTRAMUSCULAR; INTRAVENOUS at 19:19

## 2018-01-01 RX ADMIN — DIVALPROEX SODIUM 125 MG: 125 CAPSULE, COATED PELLETS ORAL at 22:34

## 2018-01-01 RX ADMIN — SENNOSIDES 8.6 MG: 8.6 TABLET, FILM COATED ORAL at 14:54

## 2018-01-01 RX ADMIN — ACETAMINOPHEN 975 MG: 325 TABLET, FILM COATED ORAL at 06:04

## 2018-01-01 RX ADMIN — ENOXAPARIN SODIUM 30 MG: 30 INJECTION SUBCUTANEOUS at 08:57

## 2018-01-01 RX ADMIN — ENOXAPARIN SODIUM 30 MG: 30 INJECTION SUBCUTANEOUS at 10:45

## 2018-01-01 RX ADMIN — ERTAPENEM SODIUM 1000 MG: 1 INJECTION, POWDER, LYOPHILIZED, FOR SOLUTION INTRAMUSCULAR; INTRAVENOUS at 10:53

## 2018-01-01 RX ADMIN — ACETAMINOPHEN 650 MG: 325 TABLET, FILM COATED ORAL at 22:33

## 2018-01-01 RX ADMIN — ACETAMINOPHEN 650 MG: 325 TABLET, FILM COATED ORAL at 14:02

## 2018-01-01 RX ADMIN — PROPRANOLOL HYDROCHLORIDE 20 MG: 20 TABLET ORAL at 08:58

## 2018-01-01 RX ADMIN — DIVALPROEX SODIUM 125 MG: 125 CAPSULE, COATED PELLETS ORAL at 21:58

## 2018-01-01 RX ADMIN — Medication 1 TABLET: at 08:57

## 2018-01-01 RX ADMIN — DOCUSATE SODIUM 100 MG: 100 CAPSULE, LIQUID FILLED ORAL at 18:02

## 2018-01-01 RX ADMIN — ACETAMINOPHEN 650 MG: 325 TABLET, FILM COATED ORAL at 12:55

## 2018-01-01 RX ADMIN — SENNOSIDES 8.6 MG: 8.6 TABLET, FILM COATED ORAL at 09:00

## 2018-01-01 RX ADMIN — ACETAMINOPHEN 975 MG: 325 TABLET, FILM COATED ORAL at 05:44

## 2018-01-01 RX ADMIN — Medication 1 TABLET: at 09:13

## 2018-01-01 RX ADMIN — ERTAPENEM SODIUM 1000 MG: 1 INJECTION, POWDER, LYOPHILIZED, FOR SOLUTION INTRAMUSCULAR; INTRAVENOUS at 12:52

## 2018-01-01 RX ADMIN — Medication 1 TABLET: at 09:01

## 2018-01-01 RX ADMIN — PROPRANOLOL HYDROCHLORIDE 20 MG: 20 TABLET ORAL at 22:34

## 2018-01-01 RX ADMIN — DOCUSATE SODIUM 100 MG: 100 CAPSULE, LIQUID FILLED ORAL at 14:54

## 2018-01-01 RX ADMIN — ACETAMINOPHEN 975 MG: 325 TABLET, FILM COATED ORAL at 21:58

## 2018-01-01 RX ADMIN — ENOXAPARIN SODIUM 30 MG: 30 INJECTION SUBCUTANEOUS at 09:14

## 2018-01-01 RX ADMIN — SENNOSIDES 8.6 MG: 8.6 TABLET, FILM COATED ORAL at 08:57

## 2018-01-01 RX ADMIN — ACETAMINOPHEN 975 MG: 325 TABLET, FILM COATED ORAL at 22:43

## 2018-01-01 RX ADMIN — PREDNISONE 5 MG: 5 TABLET ORAL at 09:03

## 2018-01-01 RX ADMIN — ENOXAPARIN SODIUM 30 MG: 30 INJECTION SUBCUTANEOUS at 14:53

## 2018-01-01 RX ADMIN — PROPRANOLOL HYDROCHLORIDE 20 MG: 20 TABLET ORAL at 09:01

## 2018-01-01 RX ADMIN — CEFTRIAXONE SODIUM 1000 MG: 10 INJECTION, POWDER, FOR SOLUTION INTRAVENOUS at 07:17

## 2018-01-01 RX ADMIN — DIVALPROEX SODIUM 125 MG: 125 CAPSULE, COATED PELLETS ORAL at 21:43

## 2018-01-01 RX ADMIN — ERTAPENEM SODIUM 1000 MG: 1 INJECTION, POWDER, LYOPHILIZED, FOR SOLUTION INTRAMUSCULAR; INTRAVENOUS at 11:28

## 2018-01-01 RX ADMIN — PROPRANOLOL HYDROCHLORIDE 20 MG: 20 TABLET ORAL at 09:13

## 2018-01-01 RX ADMIN — Medication 1 TABLET: at 14:53

## 2018-01-01 RX ADMIN — DIVALPROEX SODIUM 125 MG: 125 CAPSULE, COATED PELLETS ORAL at 22:43

## 2018-01-01 RX ADMIN — PROPRANOLOL HYDROCHLORIDE 20 MG: 20 TABLET ORAL at 00:58

## 2018-01-12 NOTE — MISCELLANEOUS
History of Present Illness  TCM Communication [facility]: The patient is being contacted for follow-up after hospitalization  She was hospitalized at [facility]  The dates of hospitalization: 11/17/2017 till 11/28/2017, date of admission: 11/17/2017, date of discharge: 11/28/2017, Patient was discharged to [facility] for [address]  Diagnosis: Dementia, CHF, RLL Pneumonia, Multiple Fractures from recurrent falls and hypertension  She was discharged to a rehabilitation center  Medications were not reviewed today  She did not schedule a follow up appointment  Communication performed and completed by [facility]  [place]      Active Problems    1  AAA (abdominal aortic aneurysm) (441 4) (I71 4)   2  Acute pain of left knee (719 46) (M25 562)   3  Ambulatory dysfunction (719 7) (R26 2)   4  At risk for loss of bone density (V49 89) (Z91 89)   5  Bilateral edema of lower extremity (782 3) (R60 0)   6  Cellulitis of right lower leg (682 6) (L03 115)   7  Chronic CHF (congestive heart failure) (428 0) (I50 9)   8  Decubitus ulcer of left buttock (707 05,707 20) (L89 329)   9  Decubitus ulcer of right buttock (707 05,707 20) (L89 319)   10  Dementia (294 20) (F03 90)   11  History of fall (V15 88) (Z91 81)   12  History of recent fall (V15 88) (Z91 81)   13  Hyperglycemia (790 29) (R73 9)   14  Hypertension (401 9) (I10)   15  Long toenail (703 8) (L60 2)   16  Lung density on x-ray (518 89) (J98 4)   17  Medicare annual wellness visit, subsequent (V70 0) (Z00 00)   18  Need for influenza vaccination (V04 81) (Z23)   19  Need for pneumococcal vaccination (V03 82) (Z23)   20  Osteoporosis (733 00) (M81 0)   21  Screening for AAA (abdominal aortic aneurysm) (V81 2) (Z13 6)   22  Sleeps in sitting position due to orthopnea (786 02) (R06 01)   23  Tobacco use disorder (305 1) (F17 200)    Past Medical History    1  History of wheezing (V12 69) (Z87 948)   2  History of Lung crackles (786 7) (R03 98)   3   History of Lung field abnormal finding on examination (793 19) (R91 8)    Surgical History    1  History of Hysterectomy    Family History  Mother    1  Family history of    2  Family history of diabetes mellitus (V18 0) (Z83 3)  Father    3  Family history of    4  Family history of malignant neoplasm (V16 9) (Z80 9)  Brother    5  Family history of     Social History    · Always uses seat belt   · Current every day smoker (305 1) (F17 200)   · Daily caffeine consumption, 2-3 servings a day   · Never    · No alcohol use    Current Meds   1  Calmoseptine 0 44-20 6 % External Ointment; APPLY TO AREA  AFFECTED AREA BID   and prn; Therapy: 39SRU3463 to (Last Rx:17Lxi5946)  Requested for: 73Del8946 Ordered   2  Furosemide 20 MG Oral Tablet; TAKE 1 TABLET DAILY; Therapy: 32Evk3278 to (Evaluate:42Ert9315)  Requested for: 55Wcv2482; Last   Rx:33Qfy3355 Ordered   3  Klor-Con 10 10 MEQ Oral Tablet Extended Release; Take 1 every other day; Therapy: 47Vey3882 to (Last Rx:11Hnf9944)  Requested for: 95HTN6158 Ordered   4  Namzaric 14-10 MG Oral Capsule Extended Release 24 Hour; 1 PO QD; Therapy: 25Fgo1636 to (Last Rx:85Nnj7526)  Requested for: 42Myt7704 Ordered    Allergies    1  No Known Drug Allergies    Message   Recorded as Task   Date: 2017 10:51 AM, Created By: System   Task Name: Hospital [facility]   Assigned To:  Lola Hart   Regarding Patient: Karlo Onofre, Status: Active   Comment:    System - 2017 10:51 AM     Patient discharged from hospital   Patient Name: Ayla Sena  Patient YOB: 1932  Discharge Date: 2017  Facility: [address] [place] [address]   Electronically signed by : Itzel Mcgill RN; 2017  4:41PM EST                       (Author)    Electronically signed by : Merline Rainwater, DO; Dec  4 2017 11:24PM EST                       (Author)

## 2018-01-16 NOTE — PROGRESS NOTES
History of Present Illness  Care Coordination Encounter Information:   Type of Encounter: Telephonic   Contact: Initial Contact    Spoke to Other   Brother Chris Burrell  Care Coordination  Nurse [address] [place] [address] [address]:   The reason for call is to discuss outreach for follow up/needed services  Spoke with pt's brother, Chris Burrell, who had asked for some type of assistance with his sister, the pt  She lves with him and his wife and they are caring for her when she needs care  Pt had a rapid decline and had seen PCP 2 weeks in a row  During those weeks, she had several falls as well  Made call to outreach and after numerous back and forth attempts, brother stated that pt is now showing marked improvement with her health and is clearer and no longer having falls  He is attributing this to her medication that Jonna Kumari had taken her off at the recent visit  He does not request any in home assistance currently as they already have a friend who is a nurse coming into the home once a week  Also, her granddaughter aides her in personal care every day  Record keeping with BP results being done  A wheelchair was obtained through the [facility], he states if they need to use one  Will follow with them in a month  Active Problems    1  AAA (abdominal aortic aneurysm) (441 4) (I71 4)   2  Acute pain of left knee (719 46) (M25 562)   3  Ambulatory dysfunction (719 7) (R26 2)   4  At risk for loss of bone density (V49 89) (Z91 89)   5  Bilateral edema of lower extremity (782 3) (R60 0)   6  Cellulitis of right lower leg (682 6) (L03 115)   7  Chronic CHF (congestive heart failure) (428 0) (I50 9)   8  Decubitus ulcer of left buttock (707 05,707 20) (L89 329)   9  Decubitus ulcer of right buttock (707 05,707 20) (L89 319)   10  Dementia (294 20) (F03 90)   11  History of fall (V15 88) (Z91 81)   12  History of recent fall (V15 88) (Z91 81)   13  Hyperglycemia (790 29) (R73 9)   14  Hypertension (401 9) (I10)   15   Long toenail (703 8) (L60 2) 16  Lung density on x-ray (518 89) (J98 4)   17  Medicare annual wellness visit, subsequent (V70 0) (Z00 00)   18  Need for influenza vaccination (V04 81) (Z23)   19  Need for pneumococcal vaccination (V03 82) (Z23)   20  Osteoporosis (733 00) (M81 0)   21  Screening for AAA (abdominal aortic aneurysm) (V81 2) (Z13 6)   22  Sleeps in sitting position due to orthopnea (786 02) (R06 01)   23  Tobacco use disorder (305 1) (F17 200)    Past Medical History    1  History of wheezing (V12 69) (Z87 898)   2  History of Lung crackles (786 7) (R09 89)   3  History of Lung field abnormal finding on examination (793 19) (R91 8)    Surgical History    1  History of Hysterectomy    Family History  Mother    1  Family history of    2  Family history of diabetes mellitus (V18 0) (Z83 3)  Father    3  Family history of    4  Family history of malignant neoplasm (V16 9) (Z80 9)  Brother    5  Family history of     Social History    · Always uses seat belt   · Current every day smoker (305 1) (F17 200)   · Daily caffeine consumption, 2-3 servings a day   · Never    · No alcohol use    Current Meds    1  Furosemide 20 MG Oral Tablet; TAKE 1 TABLET DAILY; Therapy: 34Pcm9476 to (Evaluate:69Yvt8425)  Requested for: 74Ucf1352; Last   Rx:15Pow5201 Ordered   2  Klor-Con 10 10 MEQ Oral Tablet Extended Release; Take 1 every other day; Therapy: 75Vst0530 to (Last Rx:17Cwk0613)  Requested for: 49HGZ1478 Ordered    3  Entresto 24-26 MG Oral Tablet; wean off over 2 weeks; Therapy: 37Tyz0285 to (Sharyn Kamara)  Requested for: 15Gkh2049; Last   Rx:70Zys4038 Ordered    4  Calmoseptine 0 44-20 6 % External Ointment; APPLY TO AREA  AFFECTED AREA BID   and prn; Therapy: 73GWD0583 to (Last Rx:56Waj3441)  Requested for: 06Onv6598 Ordered    5  Namzaric 14-10 MG Oral Capsule Extended Release 24 Hour; 1 PO QD; Therapy: 78Rfg0760 to (Last Rx:80Nav2773)  Requested for: 00Yux2803 Ordered    6   AmLODIPine Besylate 5 MG Oral Tablet; Take one tablet once daily; Therapy: 73Gdo4289 to (Last Rx:44Rew1587)  Requested for: 18Sfv8856 Ordered    Allergies    1  No Known Drug Allergies    End of Encounter Meds    1  Furosemide 20 MG Oral Tablet; TAKE 1 TABLET DAILY; Therapy: 08Vjd2677 to (Evaluate:29Hxr8680)  Requested for: 04Ska7661; Last   Rx:48Fwv2274 Ordered   2  Klor-Con 10 10 MEQ Oral Tablet Extended Release; Take 1 every other day; Therapy: 77Dyn1390 to (Last Rx:89Pnc7657)  Requested for: 42SKE1950 Ordered    3  Entresto 24-26 MG Oral Tablet; wean off over 2 weeks; Therapy: 31Gvk6157 to (Jose Angel Whiting)  Requested for: 36Cws3547; Last   Rx:32Thv4593 Ordered    4  Calmoseptine 0 44-20 6 % External Ointment; APPLY TO AREA  AFFECTED AREA BID   and prn; Therapy: 50GYY5196 to (Last Rx:41Fex1263)  Requested for: 64Etq0658 Ordered    5  Namzaric 14-10 MG Oral Capsule Extended Release 24 Hour; 1 PO QD; Therapy: 10Maz7711 to (Last Rx:67Gxh4631)  Requested for: 67Yyc5562 Ordered    6  AmLODIPine Besylate 5 MG Oral Tablet; Take one tablet once daily;    Therapy: 43Fgr6892 to (Last Rx:15Rwj2292)  Requested for: 08Qgv0013 Ordered    Signatures   Electronically signed by : Naun Dominguez RN; Sep 20 2017  4:24PM EST                       (Author)

## 2018-01-17 NOTE — MISCELLANEOUS
Message  3:11PM Friday, Sept 9th, I left message with pt's brother regarding abnormal CXR findings and need of CT scan to further evaluate   Invited call-back to office on Monday and advised that Rx for CT chest was printed      Signatures   Electronically signed by : Sherley Iniguez DO; Sep 12 2016 10:13AM EST                       (Author)

## 2018-01-23 NOTE — MISCELLANEOUS
History of Present Illness    The patient is being contacted for follow-up after hospitalization  Hospitalization The hospitalization was not a readmission, The patient was discharged on 11/28/17  She has a moderate risk for readmission  She was discharged to a SNF for rehabilitation  The patient's status is short term  The facility name is: [facility]  Phone number: [phone]  Floor/Location: 1st Floor  I spoke with Bill Felton, patient's nurse  The patient's discharge weight is 125 6 on admission   Patient is experiencing the following symptoms:  The patient is currently asymptomatic  [facility] Issues include: none  Treatment Questions: Diet ordered is 2 gm sodium, discharge medications were reconciled with the facility provider/PCP, the patient/family is compliant with diet and a plan is in place for who to call for worsening symptoms, but staff is not aware of what symptoms to report and daily weights are not being done  The patient has the following appointments:    none scheduled  Staff re-education topics include diet, need for daily weights, symptoms to report, patient and family education and importance of compliance with treatment  Current Meds   1  Calmoseptine 0 44-20 6 % External Ointment; APPLY TO AREA  AFFECTED AREA BID   and prn; Therapy: 87VDF1198 to (Last Rx:25Jsm2281)  Requested for: 08Fer8402 Ordered   2  Furosemide 20 MG Oral Tablet; TAKE 1 TABLET DAILY; Therapy: 59Qyf2169 to (Evaluate:17Pfa8884)  Requested for: 70Ayx8387; Last   Rx:36Ski3604 Ordered   3  Klor-Con 10 10 MEQ Oral Tablet Extended Release; Take 1 every other day; Therapy: 25Tcf5818 to (Last Rx:46Xbp8662)  Requested for: 67HSG3822 Ordered   4  Namzaric 14-10 MG Oral Capsule Extended Release 24 Hour; 1 PO QD; Therapy: 40Vja2769 to (Last Rx:84Lur0995)  Requested for: 62Mfk5885 Ordered    Allergies    1   No Known Drug Allergies    Signatures   Electronically signed by : El Schirmer, ; Dec  1 2017  2:12PM EST                       (Author)

## 2018-01-24 NOTE — MISCELLANEOUS
History of Present Illness  TCM Communication [facility]: The patient is being contacted for follow-up after hospitalization  She was hospitalized at [facility]  Diagnosis: Sepsis  She was discharged to a nursing home, Patient is no longer seen by Dr Bentley Heading  Patient is seen by a physician that covers [facility]  She did not schedule a follow up appointment  Communication performed and completed by      Active Problems    1  AAA (abdominal aortic aneurysm) (441 4) (I71 4)   2  Acute bronchopneumonia (485) (J18 0)   3  Acute pain of left knee (719 46) (M25 562)   4  Ambulatory dysfunction (719 7) (R26 2)   5  At risk for loss of bone density (V49 89) (Z91 89)   6  Bilateral edema of lower extremity (782 3) (R60 0)   7  Cellulitis of right lower leg (682 6) (L03 115)   8  Chronic CHF (congestive heart failure) (428 0) (I50 9)   9  Decubitus ulcer of left buttock (707 05,707 20) (L89 329)   10  Decubitus ulcer of right buttock (707 05,707 20) (L89 319)   11  Dementia (294 20) (F03 90)   12  History of fall (V15 88) (Z91 81)   13  History of recent fall (V15 88) (Z91 81)   14  Hyperglycemia (790 29) (R73 9)   15  Hypertension (401 9) (I10)   16  Long toenail (703 8) (L60 2)   17  Lung density on x-ray (518 89) (J98 4)   18  Medicare annual wellness visit, subsequent (V70 0) (Z00 00)   19  Need for influenza vaccination (V04 81) (Z23)   20  Need for pneumococcal vaccination (V03 82) (Z23)   21  Osteoporosis (733 00) (M81 0)   22  Pneumonia (486) (J18 9)   23  Pulmonary fibrosis (515) (J84 10)   24  Screening for AAA (abdominal aortic aneurysm) (V81 2) (Z13 6)   25  Sepsis (038 9,995 91) (A41 9)   26  Sleeps in sitting position due to orthopnea (786 02) (R06 01)   27  Tobacco use disorder (305 1) (F17 200)    Past Medical History    1  History of wheezing (V12 69) (Z87 898)   2  History of Lung crackles (786 7) (R05 89)   3   History of Lung field abnormal finding on examination (793 19) (R91 8)    Surgical History    1  History of Hysterectomy    Family History  Mother    1  Family history of    2  Family history of diabetes mellitus (V18 0) (Z83 3)  Father    3  Family history of    4  Family history of malignant neoplasm (V16 9) (Z80 9)  Brother    5  Family history of     Social History    · Always uses seat belt   · Current every day smoker (305 1) (F17 200)   · Daily caffeine consumption, 2-3 servings a day   · Never    · No alcohol use    Current Meds   1  Calmoseptine 0 44-20 6 % External Ointment; APPLY TO AREA  AFFECTED AREA BID   and prn; Therapy: 09DFJ1903 to (Last Rx:53Ujq6782)  Requested for: 18Dqt3599 Ordered   2  Furosemide 20 MG Oral Tablet; TAKE 1 TABLET DAILY; Therapy: 31Fqo2765 to (Evaluate:46Eoj4915)  Requested for: 79Hmr0115; Last   Rx:99Fqp5634 Ordered   3  Klor-Con 10 10 MEQ Oral Tablet Extended Release; Take 1 every other day; Therapy: 38Yli9810 to (Last Rx:94Rjk4631)  Requested for: 96VPR4548 Ordered   4  Namzaric 14-10 MG Oral Capsule Extended Release 24 Hour; 1 PO QD; Therapy: 78Teu7990 to (Last Rx:25Zfl6689)  Requested for: 25Wpv2104 Ordered    Allergies    1  No Known Drug Allergies    Message   Recorded as Task   Date: 2018 07:40 AM, Created By: System   Task Name: [facility] LAUREN   Assigned To:  Charan Parker   Regarding Patient: aMximilian Efrem, Status: Active   Comment:    System - 2018 7:40 AM     Patient discharged from hospital   Patient Name: Eddie Brown  Patient YOB: 1932  Facility: [place] [address]  Diagnosis: SEPSIS, UNSPECIFIED ORGANISM  Discharge Date: 2018     Signatures   Electronically signed by : Brisa Glaser DO; 2018  7:04PM EST                       (Author)

## 2018-02-26 NOTE — PROGRESS NOTES
History of Present Illness  Care Coordination Encounter Information:   Type of Encounter: Telephonic   Contact: Follow-Up    Spoke to Other   Brother,Aram  Care Coordination [place] Nurse [facility]:   The reason for call is to discuss outreach for follow up/needed services  Spoke with pt's brother and he states that she was in the hospital, then rehab, then placed in a personnal care facility in [place] a few weeks ago  However, she now currently is inpt at [facility] for pneumonia  Will close to care management  Active Problems    1  AAA (abdominal aortic aneurysm) (441 4) (I71 4)   2  Acute bronchopneumonia (485) (J18 0)   3  Acute pain of left knee (719 46) (M25 562)   4  Ambulatory dysfunction (719 7) (R26 2)   5  At risk for loss of bone density (V49 89) (Z91 89)   6  Bilateral edema of lower extremity (782 3) (R60 0)   7  Cellulitis of right lower leg (682 6) (L03 115)   8  Chronic CHF (congestive heart failure) (428 0) (I50 9)   9  Decubitus ulcer of left buttock (707 05,707 20) (L89 329)   10  Decubitus ulcer of right buttock (707 05,707 20) (L89 319)   11  Dementia (294 20) (F03 90)   12  History of fall (V15 88) (Z91 81)   13  History of recent fall (V15 88) (Z91 81)   14  Hyperglycemia (790 29) (R73 9)   15  Hypertension (401 9) (I10)   16  Long toenail (703 8) (L60 2)   17  Lung density on x-ray (518 89) (J98 4)   18  Medicare annual wellness visit, subsequent (V70 0) (Z00 00)   19  Need for influenza vaccination (V04 81) (Z23)   20  Need for pneumococcal vaccination (V03 82) (Z23)   21  Osteoporosis (733 00) (M81 0)   22  Pneumonia (486) (J18 9)   23  Pulmonary fibrosis (515) (J84 10)   24  Screening for AAA (abdominal aortic aneurysm) (V81 2) (Z13 6)   25  Sepsis (038 9,995 91) (A41 9)   26  Sleeps in sitting position due to orthopnea (786 02) (R06 01)   27  Tobacco use disorder (305 1) (F17 200)    Past Medical History    1  History of wheezing (V12 69) (Z87 648)   2   History of Lung crackles (786 7) (R09 89)   3  History of Lung field abnormal finding on examination (793 19) (R91 8)    Surgical History    1  History of Hysterectomy    Family History  Mother    1  Family history of    2  Family history of diabetes mellitus (V18 0) (Z83 3)  Father    3  Family history of    4  Family history of malignant neoplasm (V16 9) (Z80 9)  Brother    5  Family history of     Social History    · Always uses seat belt   · Current every day smoker (305 1) (F17 200)   · Daily caffeine consumption, 2-3 servings a day   · Never    · No alcohol use    Current Meds    1  Furosemide 20 MG Oral Tablet; TAKE 1 TABLET DAILY; Therapy: 28Zwj5178 to (Evaluate:95Pqd9090)  Requested for: 14Rsa3077; Last   Rx:81Elz0087 Ordered   2  Klor-Con 10 10 MEQ Oral Tablet Extended Release; Take 1 every other day; Therapy: 86Gqo7403 to (Last Rx:94Ska8741)  Requested for: 99KUX6157 Ordered    3  Calmoseptine 0 44-20 6 % External Ointment; APPLY TO AREA  AFFECTED AREA BID   and prn; Therapy: 42OWX7452 to (Last Rx:59Sxj5591)  Requested for: 52Nhx0329 Ordered    4  Namzaric 14-10 MG Oral Capsule Extended Release 24 Hour; 1 PO QD; Therapy: 32Icz8979 to (Last Rx:87Qip4136)  Requested for: 40Kuf1551 Ordered    Allergies    1  No Known Drug Allergies    End of Encounter Meds    1  Furosemide 20 MG Oral Tablet; TAKE 1 TABLET DAILY; Therapy: 73Wkf5343 to (Evaluate:19Hah7997)  Requested for: 94Exn8930; Last   Rx:04Fdf8117 Ordered   2  Klor-Con 10 10 MEQ Oral Tablet Extended Release; Take 1 every other day; Therapy: 04Wfs8524 to (Last Rx:80Ihp9860)  Requested for: 59EVV1595 Ordered    3  Calmoseptine 0 44-20 6 % External Ointment; APPLY TO AREA  AFFECTED AREA BID   and prn; Therapy: 38LBA4455 to (Last Rx:43Dta0877)  Requested for: 71Rar0090 Ordered    4  Namzaric 14-10 MG Oral Capsule Extended Release 24 Hour; 1 PO QD;    Therapy: 40Llk2878 to (Last Rx:2017)  Requested for: 2017 Ordered    Signatures   Electronically signed by : Radha Almodovar RN; Jan 11 2018  9:40AM EST                       (Author)

## 2018-04-05 NOTE — ED ATTENDING ATTESTATION
Rukhsana Elizabeth MD, saw and evaluated the patient  I have discussed the patient with the resident/non-physician practitioner and agree with the resident's/non-physician practitioner's findings, Plan of Care, and MDM as documented in the resident's/non-physician practitioner's note, except where noted  All available labs and Radiology studies were reviewed  At this point I agree with the current assessment done in the Emergency Department    I have conducted an independent evaluation of this patient a history and physical is as follows:   the patient presents for evaluation of diarrhea and possible dehydration no blood in the stool and no vomiting she has no complaints of abdominal pain the patient is a nursing home resident she has no complaints of fever no complaints of shortness of breath or chest pain  Exam: Elderly female she is pleasantly demented she is able follow commands HEENT mucous membranes are dry sclerae are anicteric lungs clear heart regular abdomen soft nontender nondistended extremities no edema  Impression: Dehydration   labs urine IV hydration    Critical Care Time  CritCare Time    Procedures

## 2018-04-05 NOTE — ED PROVIDER NOTES
History  Chief Complaint   Patient presents with    Diarrhea     Pt sent from [address] [place] [address] for diarrhea/possible dehydration  An 72-year-old female past history of Alzheimer's, hypertension and CHF; presents from her nursing home for evaluation of dehydration and diarrhea  Patient is unable to provide any history secondary to her dementia  When questioned, patient states she was sent to the hospital because she can't stop eating  Patient denies having diarrhea and abdominal pain  Complete history and review of systems unobtainable secondary to the patient's dementia  A/P:  Diarrhea, patient does have dry mucous membranes on exam   Benign abdominal exam   Will check lab work for infection, dehydration and electrolyte abnormality  Will check urine for infection  Will give IV fluid  History provided by:  Medical records and nursing home      Prior to Admission Medications   Prescriptions Last Dose Informant Patient Reported? Taking? Ergocalciferol (VITAMIN D2) 2000 units TABS   Yes Yes   Sig: Take 50,000 Units by mouth every 30 (thirty) days   Memantine HCl-Donepezil HCl (NAMZARIC) 14-10 MG CP24   Yes No   Sig: Take by mouth   Multiple Vitamins-Minerals (CEROVITE PO)   Yes Yes   Sig: Take by mouth   QUEtiapine (SEROquel) 25 mg tablet   Yes Yes   Sig: Take 25 mg by mouth 2 (two) times a day   lidocaine (LIDODERM) 5 %   No No   Sig: Place 1 patch on the skin daily Apply to lower lumbar spine midline  Remove & Discard patch within 12 hours or as directed by MD   nicotine (NICODERM CQ) 14 mg/24hr TD 24 hr patch   No No   Sig: Place 1 patch on the skin daily   predniSONE 10 mg tablet   Yes Yes   Sig: Take 10 mg by mouth daily      Facility-Administered Medications: None       Past Medical History:   Diagnosis Date    Alzheimer disease     CHF (congestive heart failure) ([facility] [address] )     Hypertension        History reviewed  No pertinent surgical history  History reviewed   No pertinent family history  I have reviewed and agree with the history as documented      Social History   Substance Use Topics    Smoking status: Former Smoker     Packs/day: 1 00    Smokeless tobacco: Never Used    Alcohol use No        Review of Systems   Unable to perform ROS: Dementia       Physical Exam  ED Triage Vitals [04/05/18 1421]   Temperature Pulse Respirations Blood Pressure SpO2   97 7 °F (36 5 °C) 94 (!) 24 144/75 96 %      Temp Source Heart Rate Source Patient Position - Orthostatic VS BP Location FiO2 (%)   Oral Monitor Sitting Right arm --      Pain Score       --           Orthostatic Vital Signs  Vitals:    04/05/18 1421   BP: 144/75   Pulse: 94   Patient Position - Orthostatic VS: Sitting       Physical Exam   General Appearance:  Awake and alert, nad, non toxic appearing  Skin:  Warm, dry, intact  HEENT: atraumatic, normocephalic; dry mucous membranes  Neck: Supple, trachea midline  Cardiac: RRR; no murmurs, rub, gallops  Pulmonary: lungs CTAB; no wheezes, rales, rhonchi  Gastrointestinal: abdomen soft, nontender, nondistended; no guarding or rebound tenderness; good bowel sounds, no mass or bruits  Extremities:  no pedal edema, 2+ pulses; no calf tenderness, no clubbing, no cyanosis  Neuro:  no focal motor or sensory deficits, CN 2-12 grossly intact  Psych:  Demented      ED Medications  Medications   sodium chloride 0 9 % bolus 1,000 mL (1,000 mL Intravenous New Bag 4/5/18 1520)       Diagnostic Studies  Results Reviewed     Procedure Component Value Units Date/Time    Urine Microscopic [[id number]]  (Abnormal) Collected:  04/05/18 1531    Lab Status:  Final result Specimen:  Urine from Urine, Clean Catch Updated:  04/05/18 1605     RBC, UA None Seen /hpf      WBC, UA None Seen /hpf      Epithelial Cells Occasional /hpf      Bacteria, UA None Seen /hpf      Hyaline Casts, UA 0-3 (A) /lpf     Comprehensive metabolic panel [[id number]]  (Abnormal) Collected:  04/05/18 1520    Lab Status:  Final result Specimen:  Blood from Arm, Right Updated:  04/05/18 1557     Sodium 137 mmol/L      Potassium 3 8 mmol/L      Chloride 103 mmol/L      CO2 30 mmol/L      Anion Gap 4 mmol/L      BUN 33 (H) mg/dL      Creatinine 0 91 mg/dL      Glucose 95 mg/dL      Calcium 8 8 mg/dL      AST 25 U/L      ALT 21 U/L      Alkaline Phosphatase 74 U/L      Total Protein 6 6 g/dL      Albumin 2 7 (L) g/dL      Total Bilirubin 0 29 mg/dL      eGFR 58 ml/min/1 73sq m     Narrative:         National Kidney Disease Education Program recommendations are as follows:  GFR calculation is accurate only with a steady state creatinine  Chronic Kidney disease less than 60 ml/min/1 73 sq  meters  Kidney failure less than 15 ml/min/1 73 sq  meters      Lipase [[id number]]  (Abnormal) Collected:  04/05/18 1520    Lab Status:  Final result Specimen:  Blood from Arm, Right Updated:  04/05/18 1557     Lipase 61 (L) u/L     CBC and differential [[id number]]  (Abnormal) Collected:  04/05/18 1520    Lab Status:  Final result Specimen:  Blood from Arm, Right Updated:  04/05/18 1532     WBC 10 33 (H) Thousand/uL      RBC 4 14 Million/uL      Hemoglobin 11 6 g/dL      Hematocrit 36 2 %      MCV 87 fL      MCH 28 0 pg      MCHC 32 0 g/dL      RDW 17 3 (H) %      MPV 9 2 fL      Platelets 340 Thousands/uL      nRBC 0 /100 WBCs      Neutrophils Relative 78 (H) %      Lymphocytes Relative 13 (L) %      Monocytes Relative 8 %      Eosinophils Relative 1 %      Basophils Relative 0 %      Neutrophils Absolute 8 05 (H) Thousands/µL      Lymphocytes Absolute 1 35 Thousands/µL      Monocytes Absolute 0 80 Thousand/µL      Eosinophils Absolute 0 07 Thousand/µL      Basophils Absolute 0 02 Thousands/µL     POCT urinalysis dipstick [[id number]]  (Normal) Resulted:  04/05/18 1532    Lab Status:  Final result Specimen:  Urine Updated:  04/05/18 1532     Color, UA -    ED Urine Macroscopic [[id number]]  (Abnormal) Collected:  04/05/18 1531    Lab Status:  Final result Specimen:  Urine Updated:  04/05/18 1530     Color, UA Yellow     Clarity, UA Cloudy     pH, UA 5 5     Leukocytes, UA Negative     Nitrite, UA Negative     Protein,  (2+) (A) mg/dl      Glucose, UA Negative mg/dl      Ketones, UA Negative mg/dl      Urobilinogen, UA 0 2 E U /dl      Bilirubin, UA Negative     Blood, UA Negative     Specific Gravity, UA >=1 030    Narrative:       CLINITEK RESULT                 No orders to display              Procedures  Procedures       Phone Contacts  ED Phone Contact    ED Course  ED Course as of Apr 05 1659   Thu Apr 05, 2018   1532 Leukocytes, UA: Negative   1615 Associated with elevated specific gravity in UA  Likely mild dehydration for which pt is receiving IVF now  BUN: (!) 33   1638 Remainder of labs within normal limits  Likely mild dehydration from viral diarrhea illness  Will discharge back to [facility]  CritCare Time    Disposition  Final diagnoses:   Mild dehydration     Time reflects when diagnosis was documented in both MDM as applicable and the Disposition within this note     Time User Action Codes Description Comment    4/5/2018  4:39 PM Kenisha, [address] [E86 0] Mild dehydration       ED Disposition     ED Disposition Condition Comment    Discharge  Jessica Ribeiro discharge to home/self care  Condition at discharge: Good        Follow-up Information     Follow up With Specialties Details Why Carolyn Diadema 1903, DO Family Medicine Schedule an appointment as soon as possible for a visit in 3 days For re-evaluation Wiley Gupta          Patient's Medications   Discharge Prescriptions    No medications on file     No discharge procedures on file      ED Provider  Electronically Signed by           Jamir Del Valle DO  04/05/18 2044

## 2018-04-05 NOTE — DISCHARGE INSTRUCTIONS
Dehydration   WHAT YOU NEED TO KNOW:   Dehydration is a condition that develops when your body does not have enough fluid  You may become dehydrated if you do not drink enough water or lose too much fluid  Fluid loss may also cause loss of electrolytes (minerals), such as sodium  DISCHARGE INSTRUCTIONS:   Return to the emergency department if:   · You have a seizure  · You are confused or cannot think clearly  · You are extremely sleepy, or another person cannot wake you  · You become dizzy or faint when you stand  · You are not able to urinate  · You have trouble breathing  · You have a fast or irregular heartbeat  · Your hands or feet are cold, or your face is pale  Contact your healthcare provider if:   · You have trouble drinking liquids because you are vomiting  · Your symptoms get worse  · You have a fever  · You feel very weak or tired  · You have questions or concerns about your condition or care  Follow up with your healthcare provider as directed:  Write down your questions so you remember to ask them during your visits  Prevent or manage dehydration:   · Drink liquids as directed  Liquids that contain water, sugar, and minerals can help your body hold in fluid and help prevent dehydration  Drink liquids throughout the day, not just when you feel thirsty  Men should drink about 3 liters (13 eight-ounce cups) of liquid each day  Women should drink about 2 liters (9 eight-ounce cups) of liquid each day  Drink even more liquid if you will be outdoors, in the sun for a long time, or exercising  · Stay cool  Limit the time you spend outdoors during the hottest part of the day  Dress in lightweight clothes  · Keep track of how often you urinate  If you urinate less than usual or your urine is darker, drink more liquids    © 2017 [employer] Information is for End User's use only and may not be sold, redistributed or otherwise used for commercial purposes  All illustrations and images included in CareNotes® are the copyrighted property of A D A M , Inc  or Francis Uribe  The above information is an  only  It is not intended as medical advice for individual conditions or treatments  Talk to your doctor, nurse or pharmacist before following any medical regimen to see if it is safe and effective for you

## 2018-07-10 NOTE — ED ATTENDING ATTESTATION
Zulema Melgar MD, saw and evaluated the patient  I have discussed the patient with the resident/non-physician practitioner and agree with the resident's/non-physician practitioner's findings, Plan of Care, and MDM as documented in the resident's/non-physician practitioner's note, except where noted  All available labs and Radiology studies were reviewed  At this point I agree with the current assessment done in the Emergency Department  I have conducted an independent evaluation of this patient a history and physical is as follows:     Fall found at [facility] on floor  From wheel  Chair      At baseline  According to NH    Resident spoke with staff  At [facility]     No complaints the patient has multiple skin tears ovarian stages of healing with dressings the patient is demented she is awake  HEENT there is no external evidence of head trauma lungs clear heart regular abdomen soft extremities no rotation and shortening of the lower extremity she is able to passively range her hips no midline back tenderness    Critical Care Time  CritCare Time    Procedures

## 2018-07-10 NOTE — ED PROVIDER NOTES
History  Chief Complaint   Patient presents with    Leg Pain     Patient brought by EMS who reports that the patient is experiencing increased leg pain and is yelling more frequently due to increased discomfort  HPI   Patient is a 81 yo woman with a history of Alzheimer dementia, CHF, and HTN who presents to the ED today after a fall  Patient is a resident of the [address] [place] [address]  She was found down on the floor by one of the nursing home staff around 3 pm today  Apparently no one witnessed the fall  She was yelling more than usual after being helped back to her chair but was unable to specify exactly what was hurting  The patient has significant dementia at baseline and generally gives nonsensical answers to questions  Patient apparently mentioned something about leg pain to EMS but was not endorsing this on arrival to ED  Per nursing home staff patient eventually stopped yelling and did not seem to be significantly altered relative to baseline, but she was transported to the ED via EMS given that she had fallen  No report of recent fevers, chills, nausea, vomiting, cough, chest pain, abdominal pain, dysuria, mental status changes  She does not take any blood thinners  Prior to Admission Medications   Prescriptions Last Dose Informant Patient Reported? Taking?    Ergocalciferol (VITAMIN D2) 2000 units TABS Past Month at Unknown time  Yes Yes   Sig: Take 50,000 Units by mouth every 30 (thirty) days   Multiple Vitamins-Minerals (CEROVITE PO) 7/10/2018 at Unknown time  Yes Yes   Sig: Take by mouth   QUEtiapine (SEROquel) 25 mg tablet 7/9/2018 at Unknown time  Yes Yes   Sig: Take 25 mg by mouth 2 (two) times a day   predniSONE 10 mg tablet 7/10/2018 at Unknown time  Yes Yes   Sig: Take 10 mg by mouth daily      Facility-Administered Medications: None       Past Medical History:   Diagnosis Date    Alzheimer disease     CHF (congestive heart failure) ([facility] [address]     Hypertension History reviewed  No pertinent surgical history  History reviewed  No pertinent family history  I have reviewed and agree with the history as documented  Social History   Substance Use Topics    Smoking status: Former Smoker     Packs/day: 1 00    Smokeless tobacco: Never Used    Alcohol use No        Review of Systems   Unable to perform ROS: Dementia        Physical Exam  ED Triage Vitals [07/10/18 1758]   Temperature Pulse Respirations Blood Pressure SpO2   98 1 °F (36 7 °C) 82 20 124/58 90 %      Temp Source Heart Rate Source Patient Position - Orthostatic VS BP Location FiO2 (%)   Oral Monitor Lying Left arm --      Pain Score       --           Orthostatic Vital Signs  Vitals:    07/10/18 1758 07/10/18 2028 07/10/18 2200   BP: 124/58 (!) 177/86 138/66   Pulse: 82 80 90   Patient Position - Orthostatic VS: Lying         Physical Exam   Constitutional: No distress  HENT:   Head: Normocephalic and atraumatic  Right Ear: External ear normal    Left Ear: External ear normal    Nose: Nose normal    Mouth/Throat: Oropharynx is clear and moist    Eyes: Conjunctivae and EOM are normal  Pupils are equal, round, and reactive to light  No scleral icterus  Neck: Normal range of motion  Neck supple  No JVD present  No tracheal deviation present  Cardiovascular: Normal rate  Exam reveals no gallop and no friction rub  Murmur heard  3/5 systolic murmur over the mitral valve  Pulmonary/Chest: Effort normal and breath sounds normal  No stridor  No respiratory distress  She has no wheezes  She has no rales  She exhibits no tenderness  Abdominal: Soft  She exhibits no distension  There is no tenderness  There is no rebound and no guarding  Musculoskeletal: Normal range of motion  She exhibits no edema or tenderness  Normal range of motion in the hips, knees, and ankles  No tenderness to palpation of the lower extremities  Lymphadenopathy:     She has no cervical adenopathy  Neurological: She is alert  No cranial nerve deficit or sensory deficit  She exhibits normal muscle tone  Patient not oriented to person, place, or time (at baseline per nursing home staff)   Skin: Skin is warm and dry  She is not diaphoretic  No erythema  No pallor  There are scattered sores covered in Vaseline dressings dated 7/9 over the bilateral UEs and LLE that do not appear erythematous or infected  Psychiatric:   Demented  Intermittently yelling  Nursing note and vitals reviewed  ED Medications  Medications   cephalexin (KEFLEX) capsule 500 mg (500 mg Oral Given 7/10/18 2311)       Diagnostic Studies  Results Reviewed     Procedure Component Value Units Date/Time    Comprehensive metabolic panel [[id number]]  (Abnormal) Collected:  07/10/18 2024    Lab Status:  Final result Specimen:  Blood from Arm, Right Updated:  07/10/18 2231     Sodium 138 mmol/L      Potassium 4 4 mmol/L      Chloride 103 mmol/L      CO2 27 mmol/L      Anion Gap 8 mmol/L      BUN 39 (H) mg/dL      Creatinine 1 00 mg/dL      Glucose 121 mg/dL      Calcium 8 7 mg/dL      AST 21 U/L      ALT 25 U/L      Alkaline Phosphatase 90 U/L      Total Protein 6 1 (L) g/dL      Albumin 2 6 (L) g/dL      Total Bilirubin 0 55 mg/dL      eGFR 51 ml/min/1 73sq m     Narrative:         National Kidney Disease Education Program recommendations are as follows:  GFR calculation is accurate only with a steady state creatinine  Chronic Kidney disease less than 60 ml/min/1 73 sq  meters  Kidney failure less than 15 ml/min/1 73 sq  meters  Urine Microscopic [[id number]]  (Abnormal) Collected:  07/10/18 2157    Lab Status:  Final result Specimen:  Urine from Urine, Other Updated:  07/10/18 2223     RBC, UA None Seen /hpf      WBC, UA Innumerable (A) /hpf      Epithelial Cells None Seen /hpf      Bacteria, UA Innumerable (A) /hpf      Hyaline Casts, UA 10-25 (A) /lpf     Urine culture [[id number]] Collected:  07/10/18 2157    Lab Status:   In process Specimen:  Urine from Urine, Other Updated:  07/10/18 2223    ED Urine Macroscopic [[id number]]  (Abnormal) Collected:  07/10/18 2157    Lab Status:  Final result Specimen:  Urine Updated:  07/10/18 2148     Color, UA Yellow     Clarity, UA Clear     pH, UA 7 0     Leukocytes, UA Small (A)     Nitrite, UA Negative     Protein,  (2+) (A) mg/dl      Glucose, UA Negative mg/dl      Ketones, UA Negative mg/dl      Urobilinogen, UA 1 0 E U /dl      Bilirubin, UA Negative     Blood, UA Negative     Specific Gravity, UA 1 025    Narrative:       CLINITEK RESULT    CBC and differential [[id number]]  (Abnormal) Collected:  07/10/18 2024    Lab Status:  Final result Specimen:  Blood from Arm, Right Updated:  07/10/18 2102     WBC 14 38 (H) Thousand/uL      RBC 3 46 (L) Million/uL      Hemoglobin 9 7 (L) g/dL      Hematocrit 32 4 (L) %      MCV 94 fL      MCH 28 0 pg      MCHC 29 9 (L) g/dL      RDW 17 0 (H) %      MPV 9 4 fL      Platelets 553 Thousands/uL      nRBC 0 /100 WBCs      Neutrophils Relative 84 (H) %      Immat GRANS % 1 %      Lymphocytes Relative 9 (L) %      Monocytes Relative 6 %      Eosinophils Relative 0 %      Basophils Relative 0 %      Neutrophils Absolute 12 11 (H) Thousands/µL      Immature Grans Absolute 0 10 Thousand/uL      Lymphocytes Absolute 1 25 Thousands/µL      Monocytes Absolute 0 90 Thousand/µL      Eosinophils Absolute 0 00 Thousand/µL      Basophils Absolute 0 02 Thousands/µL                  CT head without contrast   ED Interpretation by Charly Shabazz MD (07/10 2029)   ED Wet Read: no acute intracranial pathology      Final Result by Aurora Rodas MD (07/10 2016)      No acute intracranial abnormality  Workstation performed: [id number]               Procedures  Procedures      Phone Consults  ED Phone Contact    ED Course  ED Course as of Jul 10 2320   Tue Jul 10, 2018   2028 EKG is sinus rhythm with Intellijoule   PACs, unchanged from previous ECG 12 lead 2028 CT head without contrast   2029 Rectal temp 98 7    2111 White count 14 WBC: (!) 14 38   2114 Hgb 9 7 - stable from previous Hemoglobin: (!) 9 7   2148 Urine LE+ Leukocytes, UA: (!) Small   2228 WBC, UA: (!) Innumerable   2228 Bacteria, UA: (!) Innumerable   2316 Cr stable Creatinine: 1 00       MDM   79 yo woman with a history of dementia, HTN, CHF presenting to the ED after a fall  Patient is difficult to assess given her baseline dementia, though there are no obvious signs of injury or infection on exam   I contacted [facility] who were able to provide the history regarding the fall but otherwise report that she has been asymptomatic and was behaving normally beforehand  I attempted to contact the patient's brother and Tirso Silverio for additional information but was unable to reach him  Given the patient's broad differential I have ordered an EKG, CBC, CMP, and UA as screening labs  I have also ordered a CT head given her history of a fall  EKG negative for ischemic changes  CT head no acute pathology  Her labs are significant for a minor leukocytosis of 14, urine LE+ with micro showing innumerable bacteria and WBCs  She has remained afebrile and hemodynamically stable since her presentation to the ED  She does not have CVA tenderness on exam   She was initially placed on 2 L of O2 by NC for sats in the low 90s  However, she has a lifelong smoking history and I suspect this is not significantly different from her baseline  She did not demonstrate labored breathing or any other signs of respiratory distress on arrival   She has no LE edema on exam to suggest CHF exacerbation  She continues to yell intermittently but this does not appear to be different than her cognitive baseline  I am treating her for a UTI with PO Keflex  She received her first dose in the ED and will complete a 7 day course at [facility] Time    Disposition  Final diagnoses:   UTI (urinary tract infection)     Time reflects when diagnosis was documented in both MDM as applicable and the Disposition within this note     Time User Action Codes Description Comment    7/10/2018 11:05 PM Britni Dueñas Add [N39 0] UTI (urinary tract infection)       ED Disposition     ED Disposition Condition Comment    Discharge  Florence Erazo discharge to [facility]  Condition at discharge: Good        Follow-up Information     Follow up With Specialties Details Why Lily Melendez MD Geriatric Medicine, Family Medicine  As needed, If symptoms worsen [address]  [address] [facility] Emergency Department Emergency Medicine  As needed, If symptoms worsen [address]  [phone]  ED, [address] [place] [address], [facility]          Patient's Medications   Discharge Prescriptions    CEPHALEXIN (KEFLEX) 500 MG CAPSULE    Take 1 capsule (500 mg total) by mouth every 12 (twelve) hours for 7 days       Start Date: 7/10/2018 End Date: 7/17/2018       Order Dose: 500 mg       Quantity: 14 capsule    Refills: 0     No discharge procedures on file  ED Provider  Attending physically available and evaluated Florence Kacey  I managed the patient along with the ED Attending      Electronically Signed by         Ortiz Ortiz MD  07/10/18 7429

## 2018-07-11 NOTE — DISCHARGE INSTRUCTIONS
Urinary Traction Infection in Older Adults   WHAT YOU NEED TO KNOW:   A urinary tract infection (UTI) is caused by bacteria that get inside your urinary tract  Your urinary tract includes your kidneys, ureters, bladder, and urethra  Urine is made in your kidneys, and it flows from the ureters to the bladder  Urine leaves the bladder through the urethra  A UTI is more common in your lower urinary tract, which includes your bladder and urethra  DISCHARGE INSTRUCTIONS:   Return to the emergency department if:   · You are urinating very little or not at all  · You are vomiting  · You have a high fever with shaking chills  · You have side or back pain that gets worse  Contact your healthcare provider if:   · You have a fever  · You are a woman and you have increased white or yellow discharge from your vagina  · You do not feel better after 2 days of taking antibiotics  · You have questions or concerns about your condition or care  Medicines:   · Medicines  help treat the bacterial infection or decrease pain and burning when you urinate  You may also need medicines to decrease the urge to urinate often  Your healthcare provider may recommend cranberry juice or cranberry supplements to help decrease your symptoms  · Take your medicine as directed  Contact your healthcare provider if you think your medicine is not helping or if you have side effects  Tell him or her if you are allergic to any medicine  Keep a list of the medicines, vitamins, and herbs you take  Include the amounts, and when and why you take them  Bring the list or the pill bottles to follow-up visits  Carry your medicine list with you in case of an emergency  Self-care:   · Urinate when you feel the urge  Do not hold your urine because bacteria can grow in the bladder if urine stays in the bladder too long  It may be helpful to urinate at least every 3 to 4 hours  · Drink liquids as directed    Liquids can help flush bacteria from your urinary tract  Ask how much liquid to drink each day and which liquids are best for you  You may need to drink more liquids than usual to help flush out the bacteria  Do not drink alcohol, caffeine, and citrus juices  These can irritate your bladder and increase your symptoms  · Apply heat  on your abdomen for 20 to 30 minutes every 2 hours for as many days as directed  Heat helps decrease discomfort and pressure in your bladder  Prevent a UTI:   · Women should wipe front to back  after urinating or having a bowel movement  This may prevent germs from getting into the urinary tract  · Urinate after you have sex  to flush away bacteria that can enter your urinary tract during sex  · Wear cotton underwear and clothes that fit loose  Tight pants and nylon underwear can trap moisture and cause bacteria to grow  Follow up with your healthcare provider as directed:  Write down your questions so you remember to ask them during your visits  © 2017 [employer] Information is for End User's use only and may not be sold, redistributed or otherwise used for commercial purposes  All illustrations and images included in CareNotes® are the copyrighted property of A [employer]  or [employer]  The above information is an  only  It is not intended as medical advice for individual conditions or treatments  Talk to your doctor, nurse or pharmacist before following any medical regimen to see if it is safe and effective for you

## 2018-07-14 NOTE — PROGRESS NOTES
Re: E coli ESBL in urine  Pt is asymptomatic  I spoke to nurse at nursing home who states pt is afebrile, at her baseline now  I will fax over results for her records there, but explained that this is a colonized bacteria and doesn't need to be treated at this time   Results faxed to [phone]

## 2018-07-16 PROBLEM — J18.0 ACUTE BRONCHOPNEUMONIA: Status: ACTIVE | Noted: 2018-01-01

## 2018-07-16 PROBLEM — I50.9 CHRONIC CHF (CONGESTIVE HEART FAILURE) (HCC): Status: ACTIVE | Noted: 2017-07-20

## 2018-07-16 PROBLEM — J84.10 PULMONARY FIBROSIS (HCC): Status: ACTIVE | Noted: 2018-01-01

## 2018-07-16 PROBLEM — I71.4 AAA (ABDOMINAL AORTIC ANEURYSM) (HCC): Status: ACTIVE | Noted: 2017-07-20

## 2018-07-16 NOTE — PROGRESS NOTES
Assessment/Plan:    No problem-specific Assessment & Plan notes found for this encounter  Diagnoses and all orders for this visit:    Fall as cause of accidental injury in home as place of occurrence, initial encounter    Acute bronchitis, unspecified organism  -     levofloxacin (LEVAQUIN) 250 mg tablet; Take 1 tablet (250 mg total) by mouth daily for 7 days    Acute cystitis without hematuria  -     levofloxacin (LEVAQUIN) 250 mg tablet; Take 1 tablet (250 mg total) by mouth daily for 7 days    Late onset Alzheimer's disease with behavioral disturbance  -     QUEtiapine (SEROquel) 25 mg tablet; Take 0 5 tablets (12 5 mg total) by mouth daily at bedtime      D/C keflex    Subjective:      Patient ID: Domo Holcomb is a 80 y o  female  Patient was sent to the ER 7/10/2018 after being found on the floor on witnessed fall was seen in the emergency room  She had an elevated white blood cell count at 14 ,400  The wound care nurse today is stating that the patient is coughing very more secretions and has rhonchi in her lungs  Patient has late stage dementia but does not think she has had fever, chills or loss of appetite  She  Has had severe pneumonia in past and has Pulmonary fibrosis  She complains of no pain from fall  The following portions of the patient's history were reviewed and updated as appropriate: allergies, current medications, past medical history and problem list     Review of Systems   Constitutional: Negative for activity change, appetite change, chills, diaphoresis, fatigue, fever and unexpected weight change  HENT: Positive for congestion  Negative for postnasal drip, rhinorrhea, sinus pain, sinus pressure, sneezing, sore throat and voice change  Eyes: Negative for photophobia  Respiratory: Positive for cough and chest tightness  Negative for choking, shortness of breath, wheezing and stridor           Non productive cough   Cardiovascular: Negative for chest pain, palpitations and leg swelling  Gastrointestinal: Negative for abdominal distention, abdominal pain, constipation, diarrhea and nausea  Genitourinary: Negative for decreased urine volume, difficulty urinating, dysuria, frequency, hematuria and urgency  Musculoskeletal: Positive for gait problem  Negative for back pain, myalgias, neck pain and neck stiffness  Neurological: Negative for speech difficulty and headaches  Hematological: Negative for adenopathy  Psychiatric/Behavioral: Positive for behavioral problems and confusion  Negative for decreased concentration, dysphoric mood and hallucinations  The patient is not nervous/anxious and is not hyperactive  Yelling for people to help her constantly         Objective:      /65   Pulse 88   Temp 98 2 °F (36 8 °C)   Resp 16          Physical Exam   Constitutional: She appears cachectic  She is active and cooperative  Non-toxic appearance  She does not have a sickly appearance  She does not appear ill  No distress  HENT:   Head: Normocephalic and atraumatic  Nose: No mucosal edema, rhinorrhea, sinus tenderness or nasal deformity  Mouth/Throat: No oropharyngeal exudate  Neck: Normal range of motion  Neck supple  No tracheal deviation present  No thyromegaly present  Cardiovascular: An irregularly irregular rhythm present  Exam reveals no gallop, no S3 and no S4  Murmur heard  Systolic murmur is present with a grade of 3/6   Mitral murmur   Pulmonary/Chest: No stridor  She has decreased breath sounds in the right upper field, the right middle field, the left middle field and the left lower field  She has wheezes in the right upper field and the left lower field  She has rhonchi in the right upper field, the right middle field, the left middle field and the left lower field  She has no rales  Abdominal: Soft  Normal appearance and bowel sounds are normal  There is no hepatosplenomegaly  There is no CVA tenderness  Lymphadenopathy:     She has no cervical adenopathy  Neurological: She is alert

## 2018-08-06 PROBLEM — M35.3 PMR (POLYMYALGIA RHEUMATICA) (HCC): Status: ACTIVE | Noted: 2018-01-01

## 2018-08-28 NOTE — ED ATTENDING ATTESTATION
Andrew Banks MD, saw and evaluated the patient  I have discussed the patient with the resident/non-physician practitioner and agree with the resident's/non-physician practitioner's findings, Plan of Care, and MDM as documented in the resident's/non-physician practitioner's note, except where noted  All available labs and Radiology studies were reviewed  At this point I agree with the current assessment done in the Emergency Department  I have conducted an independent evaluation of this patient including a focused history of:    Emergency Department Note- Liz Cheung 80 y o  female MRN: [id number]    Unit/Bed#: ED 19 Encounter: [id number]    Liz Cheung is a 80 y o  female who presents with   Chief Complaint   Patient presents with    Ankle Injury     Patient was being transfered from wheelchair to bed when she fell and hit her ankle  Staff states her ankle braclet was caught on something  History of Present Illness   HPI:  Liz Cheung is a 80 y o  female who presents for evaluation of:  Laceration over the lateral right ankle  The patient was transferring from her wheelchair to her bed when her ankle bracelet became caught  The patient sustained a laceration secondary to the ankle bracelet  The patient was sent in for repair of her right ankle laceration  Review of Systems   Unable to perform ROS: Dementia (The patient is unable to provide any history and review of systems secondary to dementia and delirium)   All other systems reviewed and are negative  Historical Information   Past Medical History:   Diagnosis Date    Alzheimer disease     CHF [facility]congestive heart failure) ([facility] [address] )     Hypertension      History reviewed  No pertinent surgical history    Social History   History   Alcohol Use No     History   Drug Use No     History   Smoking Status    Former Smoker    Packs/day: 1 00   Smokeless Tobacco    Never Used     Family History: non-contributory    Meds/Allergies all medications and allergies reviewed  Allergies   Allergen Reactions    Sertraline Other (See Comments)     zoloft       Objective   First Vitals:   Blood Pressure: 155/81 (18)  Pulse: 84 (18)  Temperature: (!) 97 4 °F (36 3 °C) (18)  Temp Source: Oral (18)  Respirations: 18 (18)  Height: 5' 4" (162 6 cm) (18)  Weight - Scale: 55 8 kg (123 lb 0 3 oz) (18)  SpO2: 95 % (18)    Current Vitals:   Blood Pressure: 155/81 (18)  Pulse: 84 (18)  Temperature: (!) 97 4 °F (36 3 °C) (18)  Temp Source: Oral (18)  Respirations: 18 (18)  Height: 5' 4" (162 6 cm) (18)  Weight - Scale: 55 8 kg (123 lb 0 3 oz) (18)  SpO2: 95 % (18)    No intake or output data in the 24 hours ending 18    Invasive Devices          No matching active lines, drains, or airways          Physical Exam   Constitutional: She is oriented to person, place, and time  She appears well-nourished  No distress  Non agitated demented female presents in no distress   HENT:   Head: Normocephalic and atraumatic  Neurological: She is alert and oriented to person, place, and time  Skin: Skin is warm and dry  Psychiatric:   Decision making capacity, thought content, and judgment are impaired secondary to dementia and delirium   Nursing note and vitals reviewed  4 cm laceration is noted over the distal lateral right leg  The laceration is located above the ankle  Medical Decision Makin  Acute right lower extremity laceration:  Plan to do sutured wound repair  Plan to administer tetanus vaccination prophylaxis  Plan to obtain x-ray of the distal tib fib area    No results found for this or any previous visit (from the past 36 hour(s))    XR ankle 3+ views RIGHT    (Results Pending)         Portions of the record may have been created with voice recognition software  Occasional wrong word or "sound a like" substitutions may have occurred due to the inherent limitations of voice recognition software  Read the chart carefully and recognize, using context, where substitutions have occurred

## 2018-08-28 NOTE — ED NOTES
Dr Dominique Edwards and medical student in room with patient assessing and applying sutures to avulsion on right leg/ankle        Emmy Knapp  08/28/18 3015

## 2018-08-28 NOTE — ED PROVIDER NOTES
History  Chief Complaint   Patient presents with    Ankle Injury     Patient was being transfered from wheelchair to bed when she fell and hit her ankle  Staff states her ankle braclet was caught on something  HPI    80year-old female with a history of dementia presents from [facility] for evaluation of right leg injury  Patient is severely demented, unable to provide history  Per report from nursing home, patient was being lifted and transferred from wheelchair to bed when right ankle bracelet got caught on something and caused a laceration  No head injury reported, no LOC  Prior to Admission Medications   Prescriptions Last Dose Informant Patient Reported? Taking? Multiple Vitamins-Minerals (CEROVITE PO)   Yes Yes   Sig: Take by mouth   PHYTOPLEX Z-GUARD 57-17 % PSTE   No Yes   Sig: APPLY TOPICALLY TO RIGHT AND LEFT BUTTOCK TWICE DAILY ON THE DAY AND EVENING SHIFT   QUEtiapine (SEROquel) 25 mg tablet   No Yes   Sig: Take 0 5 tablets (12 5 mg total) by mouth daily at bedtime   ergocalciferol (VITAMIN D2) 50,000 units   No Yes   Sig: TAKE 2 CAPSULES BY MOUTH ONCE A MONTH    predniSONE 10 mg tablet   No Yes   Sig: TAKE 1 AND 1/2 TABLETS BY MOUTH ONCE DAILY      Facility-Administered Medications: None       Past Medical History:   Diagnosis Date    Alzheimer disease     CHF (congestive heart failure) (HCC)     Hypertension        History reviewed  No pertinent surgical history  History reviewed  No pertinent family history  I have reviewed and agree with the history as documented      Social History   Substance Use Topics    Smoking status: Former Smoker     Packs/day: 1 00    Smokeless tobacco: Never Used    Alcohol use No        Review of Systems   Unable to perform ROS: Dementia       Physical Exam  ED Triage Vitals [08/27/18 2259]   Temperature Pulse Respirations Blood Pressure SpO2   (!) 97 4 °F (36 3 °C) 84 18 155/81 95 %      Temp Source Heart Rate Source Patient Position - Orthostatic VS BP Location FiO2 (%)   Oral Monitor Lying Right arm --      Pain Score       --           Orthostatic Vital Signs  Vitals:    08/27/18 2259 08/28/18 0116   BP: 155/81 163/78   Pulse: 84 87   Patient Position - Orthostatic VS: Lying Lying       Physical Exam   Constitutional: No distress  Patient is alert, appears nontoxic, in no acute distress   HENT:   Head: Normocephalic and atraumatic  Mouth/Throat: Oropharynx is clear and moist  No oropharyngeal exudate  Eyes: Conjunctivae and EOM are normal  Pupils are equal, round, and reactive to light  Neck: Normal range of motion  Neck supple  Cardiovascular: Normal rate, regular rhythm, normal heart sounds and intact distal pulses  Pulmonary/Chest: Effort normal and breath sounds normal  No respiratory distress  Abdominal: Soft  Bowel sounds are normal  She exhibits no distension  There is no tenderness  Musculoskeletal: Normal range of motion  Lymphadenopathy:     She has no cervical adenopathy  Neurological: She is alert  No facial asymmetry noted, CN 2-12 intact, full ROM of upper and lower extremities, muscle strength 5/5   Skin: Skin is warm and dry  Capillary refill takes less than 2 seconds  No rash noted  She is not diaphoretic  No erythema  No pallor  Psychiatric: She has a normal mood and affect  Her behavior is normal  Judgment and thought content normal    Nursing note and vitals reviewed        ED Medications  Medications   lidocaine-epinephrine (XYLOCAINE/EPINEPHRINE) 1 %-1:100,000 injection 10 mL (10 mL Infiltration Given 8/27/18 2341)   tetanus-diphtheria-acellular pertussis (BOOSTRIX) IM injection 0 5 mL (0 5 mL Intramuscular Given 8/27/18 2340)   neomycin-bacitracin-polymyxin b (NEOSPORIN) ointment 1 small application (1 small application Topical Given 8/28/18 0109)       Diagnostic Studies  Results Reviewed     None                 XR tibia fibula 2 views RIGHT    (Results Pending)         Procedures  Procedures      Phone Consults  ED Phone Contact    ED Course           Identification of Seniors at Risk      Most Recent Value   (ISAR) Identification of Seniors at Risk   Before the illness or injury that brought you to the Emergency, did you need someone to help you on a regular basis? 1 Filed at: 08/27/2018 2303   In the last 24 hours, have you needed more help than usual?  1 Filed at: 08/27/2018 2303   Have you been hospitalized for one or more nights during the past 6 months? 0 Filed at: 08/27/2018 2303   In general, do you see well?  0 Filed at: 08/27/2018 2303   In general, do you have serious problems with your memory? 1 Filed at: 08/27/2018 2303   Do you take more than three different medications every day? 1 Filed at: 08/27/2018 2303   ISAR Score  4 Filed at: 08/27/2018 2303                          [facility]  Number of Diagnoses or Management Options  Laceration of right lower extremity:   Diagnosis management comments: Impression: 79yo female presents for evaluation of RLE laceration   Ddx: laceration  Plan: RLE xray, tetanus, suture repair    CritCare Time    Disposition  Final diagnoses:   Laceration of right lower extremity     Time reflects when diagnosis was documented in both MDM as applicable and the Disposition within this note     Time User Action Codes Description Comment    8/28/2018  1:17 AM Sherlyn Hill Add [V11  XXXA] Fall     8/28/2018  1:18 AM Sherlyn Hill Add [W97 844D] Laceration of right lower extremity     8/28/2018  1:18 AM Sherlyn Hill Modify [X54 981B] Laceration of right lower extremity     8/28/2018  1:18 AM Sherlyn Hill Remove [B79  XXXA] Fall       ED Disposition     ED Disposition Condition Comment    Discharge  [facility] discharge to home/self care  Condition at discharge: Good        Follow-up Information     Follow up With Specialties Details Why Armando Mathis MD Geriatric Medicine, Family Medicine Go in 3 days As needed, If symptoms worsen [address]            Discharge Medication List as of 8/28/2018  1:18 AM      CONTINUE these medications which have NOT CHANGED    Details   ergocalciferol (VITAMIN D2) 50,000 units TAKE 2 CAPSULES BY MOUTH ONCE A MONTH , Normal      Multiple Vitamins-Minerals (CEROVITE PO) Take by mouth, Historical Med      PHYTOPLEX Z-GUARD 57-17 % PSTE APPLY TOPICALLY TO RIGHT AND LEFT BUTTOCK TWICE DAILY ON THE DAY AND EVENING SHIFT, Normal      predniSONE 10 mg tablet TAKE 1 AND 1/2 TABLETS BY MOUTH ONCE DAILY, Normal      QUEtiapine (SEROquel) 25 mg tablet Take 0 5 tablets (12 5 mg total) by mouth daily at bedtime, Starting Mon 7/16/2018, Normal           No discharge procedures on file  ED Provider  Attending physically available and evaluated Sarah Noe I managed the patient along with the ED Attending      Electronically Signed by         Kasey Arnold MD  08/28/18 8786

## 2018-08-28 NOTE — ED PROCEDURE NOTE
Procedure  Lac Repair  Date/Time: 8/28/2018 1:48 AM  Performed by: Jon Velasquez  Authorized by: Jon Velasquez   Consent: The procedure was performed in an emergent situation  Radiology Images displayed and confirmed  If images not available, report reviewed: imaging studies available  Patient identity confirmed: arm band  Body area: lower extremity  Location details: left lower leg  Laceration length: 2 5 cm  Foreign bodies: no foreign bodies  Tendon involvement: none  Nerve involvement: none  Vascular damage: no  Anesthesia: local infiltration    Anesthesia:  Local Anesthetic: lidocaine 1% with epinephrine  Anesthetic total: 8 mL    Sedation:  Patient sedated: no    Wound Dehiscence:  Superficial Wound Dehiscence: simple closure      Procedure Details:  Preparation: Patient was prepped and draped in the usual sterile fashion  Irrigation solution: sterile water    Irrigation method: syringe  Amount of cleaning: extensive  Skin closure: Ethilon  Number of sutures: 5  Technique: simple  Approximation: close  Approximation difficulty: simple  Dressing: antibiotic ointment and gauze roll  Patient tolerance: Patient tolerated the procedure well with no immediate complications                       Noé Lewis MD  08/28/18 0150

## 2018-08-28 NOTE — ED NOTES
[address] called for transport, planned for them to pick patient up at  Hospital   08/28/18 0037

## 2018-08-28 NOTE — DISCHARGE INSTRUCTIONS
Laceration   WHAT YOU NEED TO KNOW:   A laceration is an injury to the skin and the soft tissue underneath it  Lacerations happen when you are cut or hit by something  They can happen anywhere on the body  DISCHARGE INSTRUCTIONS:   Return to the emergency department if:   · You have heavy bleeding or bleeding that does not stop after 10 minutes of holding firm, direct pressure over the wound  · Your wound opens up  Contact your healthcare provider if:   · You have a fever or chills  · Your laceration is red, warm, or swollen  · You have red streaks on your skin coming from your wound  · You have white or yellow drainage from the wound that smells bad  · You have pain that gets worse, even after treatment  · You have questions or concerns about your condition or care  Medicines:   · Prescription pain medicine  may be given  Ask how to take this medicine safely  · Antibiotics  help treat or prevent a bacterial infection  · Take your medicine as directed  Contact your healthcare provider if you think your medicine is not helping or if you have side effects  Tell him or her if you are allergic to any medicine  Keep a list of the medicines, vitamins, and herbs you take  Include the amounts, and when and why you take them  Bring the list or the pill bottles to follow-up visits  Carry your medicine list with you in case of an emergency  Care for your wound as directed:   · Do not get your wound wet  until your healthcare provider says it is okay  Do not soak your wound in water  Do not go swimming until your healthcare provider says it is okay  Carefully wash the wound with soap and water  Gently pat the area dry or allow it to air dry  · Change your bandages  when they get wet, dirty, or after washing  Apply new, clean bandages as directed  Do not apply elastic bandages or tape too tight  Do not put powders or lotions over your incision  · Apply antibiotic ointment as directed  Your healthcare provider may give you antibiotic ointment to put over your wound if you have stitches  If you have strips of tape over your incision, let them dry up and fall off on their own  If they do not fall off within 14 days, gently remove them  If you have glue over your wound, do not remove or pick at it  If your glue comes off, do not replace it with glue that you have at home  · Check your wound every day for signs of infection such as swelling, redness, or pus  Self-care:   · Apply ice  on your wound for 15 to 20 minutes every hour or as directed  Use an ice pack, or put crushed ice in a plastic bag  Cover it with a towel  Ice helps prevent tissue damage and decreases swelling and pain  · Use a splint as directed  A splint will decrease movement and stress on your wound  It may help it heal faster  A splint may be used for lacerations over joints or areas of your body that bend  Ask your healthcare provider how to apply and remove a splint  · Decrease scarring of your wound  by applying ointments as directed  Do not apply ointments until your healthcare provider says it is okay  You may need to wait until your wound is healed  Ask which ointment to buy and how often to use it  After your wound is healed, use sunscreen over the area when you are out in the sun  You should do this for at least 6 months to 1 year after your injury  Follow up with your healthcare provider as directed: You may need to follow up in 24 to 48 hours to have your wound checked for infection  You will need to return in 3 to 14 days if you have stitches or staples so they can be removed  Care for your wound as directed to prevent infection and help it heal  Write down your questions so you remember to ask them during your visits  © 2017 [employer] Information is for End User's use only and may not be sold, redistributed or otherwise used for commercial purposes   All illustrations and images included in [facility] are the copyrighted property of [employer]  or Francis Uribe  The above information is an  only  It is not intended as medical advice for individual conditions or treatments  Talk to your doctor, nurse or pharmacist before following any medical regimen to see if it is safe and effective for you

## 2018-09-10 NOTE — PROGRESS NOTES
Assessment/Plan:    No problem-specific Assessment & Plan notes found for this encounter  Diagnoses and all orders for this visit:    Laceration of anterior tibial artery, right leg, subsequent encounter  Comments:  Negative X-ray of tibia and fibula for #  Orders:  -     cephalexin (KEFLEX) 500 mg capsule; Take 1 capsule (500 mg total) by mouth every 12 (twelve) hours for 10 days    Fall as cause of accidental injury in residential institution as place of occurrence, subsequent encounter  Comments:  As resolt of transfer to wheelchair and alarm braclet got caught  PMR [facility]polym[facility]algia rheumatica) ([facility])  -     predniSONE 10 mg tablet; Take 1 tablet (10 mg total) by mouth daily    Local skin infection  -     cephalexin (KEFLEX) 500 mg capsule; Take 1 capsule (500 mg total) by mouth every 12 (twelve) hours for 10 days          Subjective:      Patient ID: Alana de la torre a 80 y o  female  Was seen in the emergency room [phone] [id number] after a fall be while being transferred to her wheelchair her alarm caught on a wheelchair she sustained a laceration of her left leg and I am asked to see the patient because of increased swelling and some erythema and some purulent discharge  She is being seen by home health who is stressed seen at but it appears to be just a Telfa and a Indy wrap  Cannot give us a history due to her demented        The following portions of the patient's history were reviewed and updated as appropriate: allergies, current medications, past medical history and problem list     Review of Systems   Unable to perform ROS: Dementia         Objective: There were no vitals taken for this visit  Physical Exam   Cardiovascular: Normal rate, regular rhythm and normal heart sounds  No murmur heard  Pulmonary/Chest: Effort normal  No accessory muscle usage  No respiratory distress  She has no decreased breath sounds  She has no wheezes  She has no rhonchi  She has no rales  Musculoskeletal:   No tenderness of skull , resolving hematoma of occipital area, no clavicle or shoulder tenderness, no hip tenderness, ROM of hips without pain   Neurological: She is alert  Coordination abnormal    Skin: Skin is warm  Abrasion and bruising noted

## 2018-12-03 PROBLEM — I50.9 HEART FAILURE (HCC): Status: ACTIVE | Noted: 2018-01-01

## 2018-12-04 PROBLEM — N39.0 UTI (URINARY TRACT INFECTION): Status: ACTIVE | Noted: 2018-01-01

## 2018-12-04 PROBLEM — I50.43 ACUTE ON CHRONIC COMBINED SYSTOLIC AND DIASTOLIC CHF (CONGESTIVE HEART FAILURE) (HCC): Status: ACTIVE | Noted: 2017-07-20

## 2018-12-04 PROBLEM — R41.82 ACUTE ALTERATION IN MENTAL STATUS: Status: ACTIVE | Noted: 2018-01-01

## 2018-12-04 PROBLEM — A41.9 SEPSIS (HCC): Status: ACTIVE | Noted: 2018-01-01

## 2018-12-04 PROBLEM — A41.9 SEPSIS (HCC): Status: RESOLVED | Noted: 2018-01-01 | Resolved: 2018-01-01

## 2018-12-04 NOTE — ASSESSMENT & PLAN NOTE
Patient has alzheimer's dementia and progressing  Follows with Dr Maloney Hidden from geriatrics, would discuss with him plan of care  Spoke with brother regarding patient, at this point he was not clear

## 2018-12-04 NOTE — PROGRESS NOTES
Assessment/Plan:    No problem-specific Assessment & Plan notes found for this encounter  Diagnoses and all orders for this visit:    Late onset Alzheimer's disease without behavioral disturbance  Comments:  patient has some worsening of memory as can't alwy recognize family  Discussed CT scan with son which was order in ER after a fall, CBC,BMP and TSH  Orders:  -     divalproex sodium (DEPAKOTE SPRINKLE) 125 MG capsule; Take 1 capsule (125 mg total) by mouth daily at bedtime    PMR (polymyalgia rheumatica) ([facility])  Comments:  no linical benifit seen with no improvement with gait or ability to stand  Will taper off prednisone over one month  Orders:  -     predniSONE 10 mg tablet; Take 1 tablet (10 mg total) by mouth daily For 2 weeks then 5 mgs daily for 2 weeks then D/C    Coarse tremors  Comments:  requires 2 hands to hold glass so can drink, essential tremor, will start with propranolol 20 mgs twice a day  Orders:  -     propranolol (INDERAL) 20 mg tablet; Take 1 tablet (20 mg total) by mouth every 12 (twelve) hours    Extrapyramidal movement disorder, drug-induced  Comments:  minimal resting tremor but marked increase in tone with cogwheel, stop quetiapine          Subjective:      Patient ID: Honey Kowalski is a 80 y o  female  Family concerned that the patient was sometimes forgetting her they were despite them sitting beside her  Apparently there has been no change in appetite mood or behavior and the family would like to know the results of her CT scan done last month when she went to the emergency room for a fall    There is no other information at this time        The following portions of the patient's history were reviewed and updated as appropriate: allergies, current medications, past medical history, past surgical history and problem list     Review of Systems   Unable to perform ROS: Dementia         Objective:      Pulse 84   Temp 97 6 °F (36 4 °C)   Resp 16          Physical Exam Constitutional: She appears well-nourished  She is cooperative  Non-toxic appearance  She has a sickly appearance  She does not appear ill  No distress  HENT:   Head: Normocephalic and atraumatic  Eyes: Pupils are equal, round, and reactive to light  Neck: Normal range of motion  No JVD present  No tracheal deviation present  No thyromegaly present  Cardiovascular: Normal rate, regular rhythm and normal heart sounds  Exam reveals no gallop  No murmur heard  No edema   Pulmonary/Chest: Effort normal  No respiratory distress  She has decreased breath sounds in the right lower field  She has no wheezes  She has no rhonchi  She has no rales  She exhibits no tenderness  Abdominal: Soft  Bowel sounds are normal  She exhibits no distension and no mass  There is no tenderness  There is no rebound and no guarding  Musculoskeletal: She exhibits no edema  Lymphadenopathy:     She has no cervical adenopathy  Neurological: She is alert  She displays tremor  She exhibits abnormal muscle tone  Essential tremor with movement and requires 2 hands to hold glass and eats with her fingers instead of spoon  , increased tone of arms and fine resting tremor, no tardive dyskinesis   Skin: Skin is warm and dry  She is not diaphoretic  Psychiatric: Her speech is normal  Thought content normal  Her mood appears anxious  Her affect is labile and inappropriate  Cognition and memory are impaired  She expresses impulsivity  She exhibits abnormal recent memory and abnormal remote memory  She is inattentive

## 2018-12-04 NOTE — ASSESSMENT & PLAN NOTE
Patient has very dirty urine, elevated white count, possibly due to steroids? ?   Would monitor wbc count  Follow cultures for urine and blood  ID consult

## 2018-12-04 NOTE — ASSESSMENT & PLAN NOTE
Recently treated for pneumonia, bronchitis, possible reason for steroid taper  Would continue with steroids and taper off for now  Consider this as patient has elevated white count now  If infection is not the reason, this might be the cause of leukocytosis  Continue to give supportive care and oxygen as needed

## 2018-12-04 NOTE — ASSESSMENT & PLAN NOTE
Patient came with confusion  BNP is elevated  Troponin leak  Would start diuresis  Cardiology consult, if gets worse  Cardiology stopped all diuretics in past  Will get a chest xray to see if she is congested

## 2018-12-04 NOTE — ED ATTENDING ATTESTATION
I, [address], DO, saw and evaluated the patient  I have discussed the patient with the resident/non-physician practitioner and agree with the resident's/non-physician practitioner's findings, Plan of Care, and MDM as documented in the resident's/non-physician practitioner's note, except where noted  All available labs and Radiology studies were reviewed  At this point I agree with the current assessment done in the Emergency Department  I have conducted an independent evaluation of this patient a history and physical is as follows:    25-year-old female found on the floor extra bed  Patient has dementia and is a very poor historian  On exam-no acute distress, skin tear noted to left shoulder and left lateral knee, heart regular with a few ectopic beats, lungs clear, abdomen soft    Plan-CT head, check labs, EKG and reassess    Critical Care Time  CritCare Time    Procedures

## 2018-12-04 NOTE — H&P
H&P- Jose Alfredo Duran 7/7/1932, 80 y o  female MRN: [id number]    Unit/Bed#: [facility] 901-01 Encounter: [id number]    Primary Care Provider: Catracho Trinidad MD   Date and time admitted to hospital: 12/4/2018  5:25 AM        Pulmonary fibrosis ([facility] )   Assessment & Plan    Recently treated for pneumonia, bronchitis, possible reason for steroid taper  Would continue with steroids and taper off for now  Consider this as patient has elevated white count now  If infection is not the reason, this might be the cause of leukocytosis  Continue to give supportive care and oxygen as needed       Acute on chronic combined systolic and diastolic CHF (congestive heart failure) ([facility] )   Assessment & Plan    Patient came with confusion  BNP is elevated  Troponin leak  Would start diuresis  Cardiology consult, if gets worse  Cardiology stopped all diuretics in past  Will get a chest xray to see if she is congested       Ambulatory dysfunction   Assessment & Plan    Patient needs Ot/PT evaluation before discharge     Dementia without behavioral disturbance   Assessment & Plan    Patient has alzheimer's dementia and progressing  Follows with Dr Renato Ramírez from geriatrics, would discuss with him plan of care  Spoke with brother regarding patient, at this point he was not clear       Essential hypertension   Assessment & Plan    Continue with propranolol 20 mg every 12 hrly     * Acute alteration in mental status   Assessment & Plan    Patient has change in mentation at the facility and was transferred here         VTE Prophylaxis: Enoxaparin (Lovenox)  / sequential compression device   Code Status: full code  POLST: discussion with patient should be done during this stay  Discussion with family: spoke with brother regarding discharge    Anticipated Length of Stay:  Patient will be admitted on an Observation basis with an anticipated length of stay of  More than 2 midnights     Justification for Hospital Stay: UTI, unresponsiveness cause    Total Time for Visit, including Counseling / Coordination of Care: 45 minutes  Greater than 50% of this total time spent on direct patient counseling and coordination of care  Chief Complaint:   unresponsive    History of Present Illness:    Mala Barnes is a 80 y o  female who presents with unresponsiveness per notes from facility  She has history of alzheimer dementia  She was recently seen for bronchitis and pneumonia  Due to her pulmonary fibrosis, smoking history started on prednisone  She seems to have elevated white count and urine was dirty  Not much information from her  She does respond to verbal commands  She is lethargic  Not able to give history at this point  Information was obtained from charts and from brother on the phone  Review of Systems:    Review of Systems   Constitutional: Negative  HENT: Negative  Eyes: Negative  Respiratory: Negative  Cardiovascular: Negative  Gastrointestinal: Negative  Endocrine: Negative  Genitourinary: Negative  Musculoskeletal: Negative  Skin: Negative  Allergic/Immunologic: Negative  Neurological: Negative  Hematological: Negative  Psychiatric/Behavioral: Negative  Past Medical and Surgical History:     Past Medical History:   Diagnosis Date    Alzheimer disease     [facility]congestive [facility] failure) ([facility] )     Hypertension        History reviewed  No pertinent surgical history  Meds/Allergies:    Prior to Admission medications    Medication Sig Start Date End Date Taking?  Authorizing Provider   divalproex sodium (DEPAKOTE SPRINKLE) 125 MG capsule Take 1 capsule (125 mg total) by mouth daily at bedtime 12/3/18  Yes Mychal Hernandez MD   ergocalciferol (VITAMIN D2) 50,000 units TAKE 2 CAPSULES BY MOUTH ONCE A MONTH  7/11/18  Yes Mychal Hernandez MD   Multiple Vitamins-Minerals (CEROVITE PO) Take by mouth   Yes Historical Provider, MD   PHYTOPLEX Z-GUARD 57-17 % PSTE APPLY TOPICALLY TO RIGHT AND LEFT BUTTOCK TWICE DAILY ON THE DAY AND EVENING SHIFT 10/19/18  Yes MARTINEZ Johnson   predniSONE 10 mg tablet Take 1 tablet (10 mg total) by mouth daily For 2 weeks then 5 mgs daily for 2 weeks then D/C 12/3/18  Yes Vicky Betancourt MD   propranolol (INDERAL) 20 mg tablet Take 1 tablet (20 mg total) by mouth every 12 (twelve) hours 12/3/18  Yes Vicky Betancourt MD     I have reviewed home medications with patient personally  Allergies: Allergies   Allergen Reactions    Sertraline Other (See Comments)     zoloft       Social History:     Marital Status: Single   Occupation:    Patient Pre-hospital Living Situation: facility  Patient Pre-hospital Level of Mobility: needs ambulatory work up  Patient Pre-hospital Diet Restrictions:   Substance Use History:   History   Alcohol Use No     History   Smoking Status    Former Smoker    Packs/day: 1 00   Smokeless Tobacco    Never Used     History   Drug Use No       Family History:    non-contributory    Physical Exam:     Vitals:   Blood Pressure: 155/69 (12/04/18 1600)  Pulse: 62 (12/04/18 1600)  Temperature: 98 1 °F (36 7 °C) (12/04/18 1506)  Temp Source: Tympanic (12/04/18 1506)  Respirations: 16 (12/04/18 1600)  Height: 5' 6" (167 6 cm) (12/04/18 0537)  Weight - Scale: 54 4 kg (120 lb) (12/04/18 0537)  SpO2: 98 % (12/04/18 1600)    Physical Exam   Constitutional: She appears well-developed and well-nourished  No distress  HENT:   Head: Normocephalic and atraumatic  Right Ear: External ear normal    Left Ear: External ear normal    Nose: Nose normal    Mouth/Throat: Oropharynx is clear and moist  No oropharyngeal exudate  Eyes: Pupils are equal, round, and reactive to light  Conjunctivae and EOM are normal  Right eye exhibits no discharge  Left eye exhibits no discharge  No scleral icterus  Neck: Normal range of motion  Neck supple  JVD present  No tracheal deviation present  No thyromegaly present     Cardiovascular: Normal rate and regular rhythm  Exam reveals no friction rub  Pulmonary/Chest: Effort normal and breath sounds normal  No stridor  No respiratory distress  She has no wheezes  She has no rales  She exhibits no tenderness  Abdominal: Soft  Bowel sounds are normal  She exhibits no distension and no mass  There is no tenderness  There is no rebound and no guarding  Musculoskeletal: Normal range of motion  She exhibits edema  She exhibits no tenderness or deformity  Lymphadenopathy:     She has no cervical adenopathy  Neurological: She is alert  She has normal reflexes  She displays normal reflexes  She exhibits normal muscle tone  Coordination normal    Skin: Skin is warm and dry  No rash noted  She is not diaphoretic  No erythema  No pallor  Psychiatric: She has a normal mood and affect  Her behavior is normal  Judgment and thought content normal    Nursing note and vitals reviewed  Additional Data:     Lab Results: I have personally reviewed pertinent reports  Results from last 7 days  Lab Units 12/04/18  1326 12/04/18  0654   WBC Thousand/uL  --  23 12*   HEMOGLOBIN g/dL  --  11 1*   HEMATOCRIT %  --  35 5   PLATELETS Thousands/uL 275 277   BANDS PCT %  --  9*   LYMPHO PCT %  --  2*   MONO PCT %  --  3*   EOS PCT %  --  0       Results from last 7 days  Lab Units 12/04/18  0654   SODIUM mmol/L 136   POTASSIUM mmol/L 4 3   CHLORIDE mmol/L 104   CO2 mmol/L 27   BUN mg/dL 53*   CREATININE mg/dL 1 32*   ANION GAP mmol/L 5   CALCIUM mg/dL 9 4   GLUCOSE RANDOM mg/dL 92                   Results from last 7 days  Lab Units 12/04/18  1352   PROCALCITONIN ng/ml 23 81*       Imaging: I have personally reviewed pertinent reports  XR chest portable   Final Result by Autumn Muniz MD (12/04 1502)      No acute cardiopulmonary disease              Workstation performed: [id number]         CT head without contrast   Final Result by Abdi Gordon DO (12/04 0801)   Stable cerebral atrophy with chronic small vessel ischemic white matter disease  No acute intracranial abnormality  Workstation performed: [id number]             EKG, Pathology, and Other Studies Reviewed on Admission:   · EK bmp, no st-t wave changes, irregularly irregular, PVCs, PACs  Allscripts / Epic Records Reviewed: Yes     ** Please Note: This note has been constructed using a voice recognition system   **

## 2018-12-04 NOTE — CONSULTS
Consultation - Infectious Disease   Maynor Rivas 80 y o  female MRN: [id number]  Unit/Bed#: ED 11 Encounter: [id number]      IMPRESSION & RECOMMENDATIONS:   1  Leukocytosis-possibly secondary to urinary tract infection  Possibly secondary to another infectious or noninfectious etiology  Fortunately the patient remains hemodynamically stable and nontoxic despite the leukocytosis  She has been afebrile and does not have any current symptomatology  -hold addition was ceftriaxone for now  -follow-up blood cultures and urine cultures  -check procalcitonin level now and tomorrow a m   -additional workup as needed    2  Abnormal urinalysis-possible UTI verses asymptomatic bacteriuria  Patient denies having any urinary symptoms  She has also been afebrile and hemodynamically stable  She has been previously at least colonized or infected with an ESBL E coli   -no additional antibiotics for now  -if the patient would become febrile, or more ill, would consider treating the patient with ertapenem 1 g IV Q 24 hours while waiting additional data  3   Acute urinary retention-possibly complicated by UTI  Unclear etiology  The patient has had a Vargas catheter placed   -Vargas drainage  -urology evaluation pending  -no additional ID workup for now    4  Elevated serum creatinine-likely multifactorial including obstruction, and possible pre renal issues   -Vargas drainage  -volume management  -recheck BMP tomorrow    5  Acute encephalopathy-in the setting of baseline Alzheimer dementia  Suspect metabolic issues, and urinary retention playing a role  Possible infection as above  Patient's cognition has apparently abruptly improved    Unclear baseline   -monitor cognition  -treat metabolic issues  -follow-up urine culture and blood culture    HISTORY OF PRESENT ILLNESS:  Reason for Consult:  UTI  HPI: Maynor Rivas is a 80y o  year old female with a history of advanced dementia admitted to [facility] in [place] after being found down at the nursing home, who I am asked to assist with management  The patient is unable to give any detailed history due to her baseline cognitive state, and therefore the history is through detailed chart review and discussion with the ER nursing staff  Patient apparently was recently treated for bronchitis and pneumonia at the nursing facility and had been started on antibiotics and prednisone  She is also on a baseline dose of prednisone for apparent PMR  She was seen by geriatrics yesterday and that time was apparently alert interactive  However early this morning the patient apparently was found down with some notable lacerations on the left shoulder  She was brought to the emergency department for further evaluation  In the emergency department the patient was found to be awake but unable to give any detailed history  She underwent CT of the head which was negative for any etiology  Laboratories revealed evidence of an elevated WBC count, acute kidney injury, and an abnormal urinalysis  She had urine cultures and blood cultures obtained and was started on ceftriaxone is being admitted for further management  Under initial presentation she was also found to have urinary retention had a Vargas catheter placed  She currently denies having any pain, denies any fever, denies any nausea vomiting or diarrhea, denies any urinary symptoms, denies any cough or shortness of breath  REVIEW OF SYSTEMS:  A complete 12 point system-based review of systems is negative other than that noted in the HPI  PAST MEDICAL HISTORY:  Past Medical History:   Diagnosis Date    Alzheimer disease     [facility]F [facility]congestive [facility]) [facility] [address] [facility]     Hypertension      History reviewed  No pertinent surgical history      FAMILY HISTORY:  Non-contributory    SOCIAL HISTORY:  Social History   History   Alcohol Use No     History   Drug Use No     History   Smoking Status    Former Smoker    Packs/day: 1 00   Smokeless Tobacco    Never Used       ALLERGIES:  Allergies   Allergen Reactions    Sertraline Other (See Comments)     zoloft       MEDICATIONS:  All current active medications have been reviewed  Antibiotics:  Ceftriaxone x1 dose    PHYSICAL EXAM:  Temp:  [98 2 °F (36 8 °C)] 98 2 °F (36 8 °C)  HR:  [78-95] 78  Resp:  [16-20] 16  BP: (110-155)/(56-97) 135/63  SpO2:  [92 %-99 %] 99 %  Temp (24hrs), Av 2 °F (36 8 °C), Min:98 2 °F (36 8 °C), Max:98 2 °F (36 8 °C)  Current: Temperature: 98 2 °F (36 8 °C)    Intake/Output Summary (Last 24 hours) at 18 1431  Last data filed at 18 0817   Gross per 24 hour   Intake               50 ml   Output             1100 ml   Net            -1050 ml       General Appearance:  Elderly, debilitated, nontoxic   Head:  Normocephalic, without obvious abnormality, atraumatic   Eyes:  Conjunctiva pink and sclera anicteric, both eyes   Nose: Nares normal, mucosa normal, no drainage   Throat: Oropharynx moist without lesions   Neck: Supple, symmetrical, no adenopathy, no tenderness/mass/nodules   Back:   Symmetric, no curvature, ROM normal, no CVA tenderness   Lungs:   Decreased breath sounds bilaterally, respirations unlabored   Chest Wall:  No tenderness or deformity   Heart:  RRR; no murmur, rub or gallop   Abdomen:   Soft, non-tender, non-distended, positive bowel sounds    Extremities: No cyanosis, clubbing or edema   Skin: No rashes or lesions  No draining wounds noted  Lymph nodes: Cervical, supraclavicular nodes normal   Neurologic: Alert, answer simple yes no questions, able to move her extremities       LABS, IMAGING, & OTHER STUDIES:  Lab Results:  I have personally reviewed pertinent labs      Results from last 7 days  Lab Units 18  1326 18  0654   WBC Thousand/uL  --  23 12*   HEMOGLOBIN g/dL  --  11 1*   PLATELETS Thousands/uL 275 277       Results from last 7 days  Lab Units 18  0654   SODIUM mmol/L 136   POTASSIUM mmol/L 4 3 CHLORIDE mmol/L 104   CO2 mmol/L 27   BUN mg/dL 53*   CREATININE mg/dL 1 32*   EGFR ml/min/1 73sq m 37   CALCIUM mg/dL 9 4        blood cultures x2 sets pending    Urine cultures pending    Imaging Studies:   I have personally reviewed pertinent imaging study reports and images in PACS      Chest x-ray-no definitive pneumonia    Awaiting formal radiology read    Images reviewed by me in PACS

## 2018-12-04 NOTE — ASSESSMENT & PLAN NOTE
· Recently treated for pneumonia, bronchitis, possible reason for steroid taper  · Would continue with steroid taper  · Consider prednisone taper to be contributing to leukocytosis  · Continue to give supportive care and oxygen as needed

## 2018-12-04 NOTE — ASSESSMENT & PLAN NOTE
· Patient has alzheimer's dementia with likely progression  · Follows with Dr Katie Hinds with geriatrics  · Geriatrics consult

## 2018-12-04 NOTE — ASSESSMENT & PLAN NOTE
· BNP is elevated, Troponin at 0 05-->0 04  · Appears euvolemic on exam, CXR negative for acute cardiopulmonary disease  · Cardiology stopped all diuretics in past   · Echo from 2017 shows EF 55%, no regional wall motion abnormalities  · Monitor

## 2018-12-04 NOTE — ASSESSMENT & PLAN NOTE
· Patient had change in mentation at her facility and was transferred here    Per ED notes, patient was agitated and then found on the floor of her room  · CT head negative for acute intracranial abnormality  · Patient does have hx of dementia - consult geriatrics  · Etiology likely 2/2 UTI, YANNI, urinary rention - see related plans   · Monitor

## 2018-12-04 NOTE — ED PROVIDER NOTES
History  Chief Complaint   Patient presents with    Fall     Pt was found on floor next to her bed as reported by staff at [facility]  Pt has no complaints at this time  81 yo F brought to ED for evaluation after she was found on the ground next to her bed at nursing facility  Per [facility], pt had been agitated tonight, and then was found on the ground after presumed fall  Pt's bed is only about 1 foot off the ground  Pt unable to provide any history due to severe dementia at baseline  Prior to Admission Medications   Prescriptions Last Dose Informant Patient Reported? Taking? Multiple Vitamins-Minerals (CEROVITE PO) 12/3/2018 at Unknown time  Yes Yes   Sig: Take by mouth   PHYTOPLEX Z-GUARD 57-17 % PSTE 12/3/2018 at Unknown time  No Yes   Sig: APPLY TOPICALLY TO RIGHT AND LEFT BUTTOCK TWICE DAILY ON THE DAY AND EVENING SHIFT   divalproex sodium (DEPAKOTE SPRINKLE) 125 MG capsule 12/3/2018 at Unknown time  No Yes   Sig: Take 1 capsule (125 mg total) by mouth daily at bedtime   ergocalciferol (VITAMIN D2) 50,000 units 12/3/2018 at Unknown time  No Yes   Sig: TAKE 2 CAPSULES BY MOUTH ONCE A MONTH    predniSONE 10 mg tablet 12/3/2018 at Unknown time  No Yes   Sig: Take 1 tablet (10 mg total) by mouth daily For 2 weeks then 5 mgs daily for 2 weeks then D/C   propranolol (INDERAL) 20 mg tablet 12/3/2018 at Unknown time  No Yes   Sig: Take 1 tablet (20 mg total) by mouth every 12 (twelve) hours      Facility-Administered Medications: None       Past Medical History:   Diagnosis Date    Alzheimer disease     [facility]congest[facility]) ([facility] [address] [facility]     Hypertension        History reviewed  No pertinent surgical history  History reviewed  No pertinent family history  I have reviewed and agree with the history as documented      Social History   Substance Use Topics    Smoking status: Former Smoker     Packs/day: 1 00    Smokeless tobacco: Never Used    Alcohol use No        Review of Systems   Unable to perform ROS: Dementia       Physical Exam  ED Triage Vitals [12/04/18 0537]   Temperature Pulse Respirations Blood Pressure SpO2   98 2 °F (36 8 °C) 87 18 155/81 97 %      Temp Source Heart Rate Source Patient Position - Orthostatic VS BP Location FiO2 (%)   Oral Monitor Lying Left arm --      Pain Score       No Pain           Orthostatic Vital Signs  Vitals:    12/04/18 1530 12/04/18 1600 12/04/18 1715 12/04/18 1914   BP: 150/74 155/69 145/65 137/60   Pulse: 68 62 62 62   Patient Position - Orthostatic VS: Sitting Sitting Lying Lying       Physical Exam   Constitutional: She appears well-developed  No distress  HENT:   Head: Normocephalic and atraumatic  Right Ear: External ear normal    Left Ear: External ear normal    Nose: Nose normal    Eyes: Conjunctivae and EOM are normal  Right eye exhibits no discharge  Left eye exhibits no discharge  Neck: Normal range of motion  Neck supple  nontender   Cardiovascular: Normal rate, regular rhythm and intact distal pulses  Pulmonary/Chest: Effort normal and breath sounds normal  No stridor  No respiratory distress  Abdominal: Soft  She exhibits distension (distended bladder)  There is tenderness  There is no rebound and no guarding  Musculoskeletal: Normal range of motion  She exhibits no edema or tenderness  Neurological: She is alert  No sensory deficit  Oriented x0, does not follow instructions   Skin: Skin is warm and dry  Capillary refill takes less than 2 seconds  No rash noted  She is not diaphoretic  Skin tear to L shoulder, L lower extremity   Nursing note and vitals reviewed        ED Medications  Medications   divalproex sodium (DEPAKOTE SPRINKLE) capsule 125 mg (not administered)   ergocalciferol (VITAMIN D2) capsule 50,000 Units (not administered)   multivitamin-minerals (CENTRUM) tablet 1 tablet (1 tablet Oral Given 12/4/18 1453)   predniSONE tablet 10 mg (10 mg Oral Given 12/4/18 1454)   propranolol (INDERAL) tablet 20 mg (20 mg Oral Given 12/4/18 1453)   docusate sodium (COLACE) capsule 100 mg (100 mg Oral Not Given 12/4/18 1817)   senna (SENOKOT) tablet 8 6 mg (8 6 mg Oral Given 12/4/18 1454)   ondansetron (ZOFRAN) injection 4 mg (not administered)   aluminum-magnesium hydroxide-simethicone (MYLANTA) 200-200-20 mg/5 mL oral suspension 30 mL (not administered)   enoxaparin (LOVENOX) subcutaneous injection 30 mg (30 mg Subcutaneous Given 12/4/18 1453)   acetaminophen (TYLENOL) tablet 650 mg (not administered)   influenza vaccine, high-dose (FLUZONE HIGH-DOSE) IM injection AUREA 0 5 mL (not administered)   ceftriaxone (ROCEPHIN) 1 g/50 mL in dextrose IVPB (0 mg Intravenous Stopped 12/4/18 0817)       Diagnostic Studies  Results Reviewed     Procedure Component Value Units Date/Time    Blood culture [[id number]] Collected:  12/04/18 1505    Lab Status: In process Specimen:  Blood from Arm, Left Updated:  12/04/18 1514    Procalcitonin [[id number]]  (Abnormal) Collected:  12/04/18 1352    Lab Status:  Final result Specimen:  Blood from Arm, Right Updated:  12/04/18 1444     Procalcitonin 23 81 (H) ng/ml     Troponin I [[id number]]  (Normal) Collected:  12/04/18 1326    Lab Status:  Final result Specimen:  Blood from Arm, Right Updated:  12/04/18 1442     Troponin I 0 04 ng/mL     TSH, 3rd generation [[id number]]  (Normal) Collected:  12/04/18 1326    Lab Status:  Final result Specimen:  Blood from Arm, Right Updated:  12/04/18 1432     TSH 3RD GENERATON 2 240 uIU/mL     Narrative:         Patients undergoing fluorescein dye angiography may retain small amounts of fluorescein in the body for 48-72 hours post procedure  Samples containing fluorescein can produce falsely depressed TSH values  If the patient had this procedure,a specimen should be resubmitted post fluorescein clearance            The recommended reference ranges for TSH during pregnancy are as follows:  First trimester 0 1 to 2 5 uIU/mL  Second trimester  0 2 to 3 0 uIU/mL  Third trimester 0 3 to 3 0 uIU/m      Troponin I [[id number]]     Lab Status:  No result Specimen:  Blood     Blood culture [[id number]] Collected:  12/04/18 1352    Lab Status: In process Specimen:  Blood from Arm, Right Updated:  12/04/18 1359    Platelet count [[id number]]  (Normal) Collected:  12/04/18 1326    Lab Status:  Final result Specimen:  Blood from Arm, Right Updated:  12/04/18 1335     Platelets 342 Thousands/uL      MPV 9 7 fL     NT-BNP PRO [[id number]]  (Abnormal) Collected:  12/04/18 0654    Lab Status:  Final result Specimen:  Blood from Hand, Left Updated:  12/04/18 1111     NT-proBNP 9,919 (H) pg/mL     Urine Microscopic [[id number]]  (Abnormal) Collected:  12/04/18 0657    Lab Status:  Final result Specimen:  Urine from Urine, Other Updated:  12/04/18 0901     RBC, UA 4-10 (A) /hpf      WBC, UA 30-50 (A) /hpf      Epithelial Cells None Seen /hpf      Bacteria, UA Innumerable (A) /hpf     Urine culture [[id number]] Collected:  12/04/18 0657    Lab Status: In process Specimen:  Urine from Urine, Other Updated:  12/04/18 0901    CBC and differential [[id number]]  (Abnormal) Collected:  12/04/18 0654    Lab Status:  Final result Specimen:  Blood from Hand, Left Updated:  12/04/18 0828     WBC 23 12 (H) Thousand/uL      RBC 3 78 (L) Million/uL      Hemoglobin 11 1 (L) g/dL      Hematocrit 35 5 %      MCV 94 fL      MCH 29 4 pg      MCHC 31 3 (L) g/dL      RDW 16 2 (H) %      MPV 9 1 fL      Platelets 779 Thousands/uL      nRBC 0 /100 WBCs     Narrative: This is an appended report  These results have been appended to a previously verified report      Basic metabolic panel [[id number]]  (Abnormal) Collected:  12/04/18 0654    Lab Status:  Final result Specimen:  Blood from Hand, Left Updated:  12/04/18 0735     Sodium 136 mmol/L      Potassium 4 3 mmol/L      Chloride 104 mmol/L      CO2 27 mmol/L      ANION GAP 5 mmol/L      BUN 53 (H) mg/dL      Creatinine 1 32 (H) mg/dL      Glucose 92 mg/dL      Calcium 9 4 mg/dL      eGFR 37 ml/min/1 73sq m     Narrative:         National Kidney Disease Education Program recommendations are as follows:  GFR calculation is accurate only with a steady state creatinine  Chronic Kidney disease less than 60 ml/min/1 73 sq  meters  Kidney failure less than 15 ml/min/1 73 sq  meters  Troponin I [[id number]]  (Abnormal) Collected:  12/04/18 0654    Lab Status:  Final result Specimen:  Blood from Arm, Left Updated:  12/04/18 0734     Troponin I 0 05 (H) ng/mL     POCT urinalysis dipstick [[id number]]  (Normal) Resulted:  12/04/18 0713    Lab Status:  Final result Updated:  12/04/18 0714     Nitrite, UA (Ref: Negative) +    ED Urine Macroscopic [[id number]]  (Abnormal) Collected:  12/04/18 0657    Lab Status:  Final result Specimen:  Urine Updated:  12/04/18 0656     Color, UA Yellow     Clarity, UA Clear     pH, UA 7 0     Leukocytes, UA Large (A)     Nitrite, UA Positive (A)     Protein,  (2+) (A) mg/dl      Glucose, UA Negative mg/dl      Ketones, UA Negative mg/dl      Urobilinogen, UA 0 2 E U /dl      Bilirubin, UA Negative     Blood, UA Moderate (A)     Specific [place], UA 1 015    Narrative:       CLINITEK RESULT                 XR chest portable   Final Result by Miki Dumont MD (12/04 1502)      No acute cardiopulmonary disease  Workstation performed: [id number]         CT head without contrast   Final Result by Glenna Denny DO (12/04 0801)   Stable cerebral atrophy with chronic small vessel ischemic white matter disease  No acute intracranial abnormality  Workstation performed: [id number]               Procedures  Procedures      Phone Consults  ED Phone Contact    ED Course           Identification of Seniors at Risk      Most Recent Value   (ISAR) Identification of Seniors at Risk   Before the illness or injury that brought you to the Emergency, did you need someone to help you on a regular basis?   1 Filed at: 12/04/2018 0532   In the last 24 hours, have you needed more help than usual?  0 Filed at: 12/04/2018 0532   Have you been hospitalized for one or more nights during the past 6 months? 1 Filed at: 12/04/2018 0532   In general, do you see well?  0 Filed at: 12/04/2018 0532   In general, do you have serious problems with your memory? 1 Filed at: 12/04/2018 0532   Do you take more than three different medications every day? 1 Filed at: 12/04/2018 0532   ISAR Score  4 Filed at: 12/04/2018 0532                          MDM  Number of Diagnoses or Management Options  Diagnosis management comments: Very distended bladder  Per nursing facility, no history of retention  1L urine after briones placed  Consistent with UTI  Also has YANNI, mild troponin elevation  Rocephin, admit to SLIM  CritCare Time    Disposition  Final diagnoses:   Urinary tract infection   [facility]   Urinary retention   Late onset Alzheimer's disease without behavioral disturbance   Ambulatory dysfunction     Time reflects when diagnosis was documented in both MDM as applicable and the Disposition within this note     Time User Action Codes Description Comment    12/4/2018  8:59 AM Wilhemena Isaias Add [N39 0] Urinary tract infection     12/4/2018  8:59 AM Zaria Monahan Worthington Add [N17 9] YANNI (acute kidney injury) ([facility] [address] )     12/4/2018  9:00 AM Wilhemena Isaias Add [R33 9] Urinary retention     12/4/2018  9:01 AM Zaria Monahan Worthington Add [G30 1,  F02 80] Late onset Alzheimer's disease without behavioral disturbance     12/4/2018  9:01 AM Zaria Monahan Worthington Add [R26 2] Ambulatory dysfunction       ED Disposition     ED Disposition Condition Comment    Admit  Case was discussed with matias and the patient's admission status was agreed to be Admission Status: observation status to the service of Dr Yemi Cano           Follow-up Information    None         Current Discharge Medication List      CONTINUE these medications which have NOT CHANGED    Details   divalproex sodium (DEPAKOTE SPRINKLE) 125 MG capsule Take 1 capsule (125 mg total) by mouth daily at bedtime  Qty: 28 capsule, Refills: 5    Associated Diagnoses: Late onset Alzheimer's disease without behavioral disturbance      ergocalciferol (VITAMIN D2) 50,000 units TAKE 2 CAPSULES BY MOUTH ONCE A MONTH  Qty: 2 capsule, Refills: 11    Associated Diagnoses: Vitamin D deficiency      Multiple Vitamins-Minerals (CEROVITE PO) Take by mouth      PHYTOPLEX Z-GUARD 57-17 % PSTE APPLY TOPICALLY TO RIGHT AND LEFT BUTTOCK TWICE DAILY ON THE DAY AND EVENING SHIFT  Qty: 113 g, Refills: 11    Associated Diagnoses: Irritant contact dermatitis due to other chemical products      predniSONE 10 mg tablet Take 1 tablet (10 mg total) by mouth daily For 2 weeks then 5 mgs daily for 2 weeks then D/C  Qty: 28 tablet, Refills: 2    Associated Diagnoses: PMR (polymyalgia rheumatica) (HCC)      propranolol (INDERAL) 20 mg tablet Take 1 tablet (20 mg total) by mouth every 12 (twelve) hours  Qty: 56 tablet, Refills: 5    Associated Diagnoses: Coarse tremors           No discharge procedures on file  ED Provider  Attending physically available and evaluated Aldrich Her  I managed the patient along with the ED Attending      Electronically Signed by         Mateo Smith MD  12/04/18 6395

## 2018-12-05 PROBLEM — I50.42 CHRONIC COMBINED SYSTOLIC AND DIASTOLIC HEART FAILURE (HCC): Status: ACTIVE | Noted: 2017-07-20
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
## 2018-12-05 PROBLEM — R33.9 URINARY RETENTION: Status: ACTIVE | Noted: 2018-01-01

## 2018-12-05 PROBLEM — N17.9 AKI (ACUTE KIDNEY INJURY) (HCC): Status: ACTIVE | Noted: 2018-01-01

## 2018-12-05 PROBLEM — G92.8 TOXIC METABOLIC ENCEPHALOPATHY: Status: ACTIVE | Noted: 2018-01-01

## 2018-12-05 PROBLEM — R53.81 DEBILITY: Status: ACTIVE | Noted: 2018-01-01

## 2018-12-05 NOTE — PROGRESS NOTES
Pt presenting from ED alert and oriented to self (responds to name)  2 RN skin check completed with Saurabh Monique RN, as well as Kiara Todd RN  Pt has scattered bruising on b/l arms and legs of different healing stages  Pt also has multiple scattered skin tears on b/l arms and legs as well as on the L shoulder  In addition, pt has a stage I pressure ulcer on the sacrum  Pt is wearing davonte sleeves (thick padded arm sleeves) on b/l arms  Underneath the Davonte sleeves are fabric/muslin sleeves and kerlex gauze  Underneath the kerlex on the R posterior upper arm was a nonadherent dressing (which was brown in color and saturated with old blood) with tegaderm over top  On the L wrist the pt had a nonadherent dressing with gauze over top  The nonadherent was brown in color and crusted on to the wound so that saline had to be applied and left on for 3-5 minutes in order for it to be removed  Skin tears/wounds were then cleaned with saline solution and those that appeared to be raw/open were covered with appropriate dressings  Will continue to monitor pt and treat within ordered plan of care

## 2018-12-05 NOTE — PHYSICAL THERAPY NOTE
PHYSICAL THERAPY EVALUATION  NAME:  Hailey Bowles  DATE: 12/05/18    AGE:   80 y o  Mrn:   [id number]  ADMIT DX:  Urinary retention [R33 9]  Urinary tract infection [N39 0]  Pain [R52]  [facility] ) [N17 9]  Ambulatory dysfunction [R26 2]  Late onset Alzheimer's disease without behavioral disturbance [G30 1, F02 80]    Past Medical History:   Diagnosis Date    Alzheimer disease     CHF (congestive heart failure) ([facility] )     Hypertension        History reviewed  No pertinent surgical history  Length Of Stay: 0    PHYSICAL THERAPY EVALUATION:      12/05/18 1416   Note Type   Note type Eval only   Pain Assessment   Pain Assessment FLACC   Pain Rating: FLACC (Rest) - Face 0   Pain Rating: FLACC (Rest) - Legs 0   Pain Rating: FLACC (Rest) - Activity 0   Pain Rating: FLACC (Rest) - Cry 0   Pain Rating: FLACC (Rest) - Consolability 0   Score: FLACC (Rest) 0   Pain Rating: FLACC (Activity) - Face 1   Pain Rating: FLACC (Activity) - Legs 1   Pain Rating: FLACC (Activity) - Activity 1   Pain Rating: FLACC (Activity) - Cry 1   Pain Rating: FLACC (Activity) - Consolability 1   Score: FLACC (Activity) 5   Home Living   Type of Home Assisted living  (dmentia unit at [facility] as per CM)   Home Layout One level   [address] [facility] [facility]   Additional Comments [address] she was I with transfers into and out of  as well as WC mobility as per CM  Pt poor historian   Prior Function   Level of [place] Needs assistance with ADLs and functional mobility   Lives With Facility staff   Receives Help From Personal care attendant   ADL Assistance Needs assistance   Vocational Retired   Comments Pt utilizes a [facility] for mobility PTA as per [place]    Restrictions/Precautions   [place] Bearing Precautions Per Order No   Other Precautions Cognitive; Chair Alarm; Bed Alarm;Multiple lines; Fall Risk;Pain   General   Family/Caregiver Present No   Cognition   Overall Cognitive Status Impaired   Arousal/Participation Responsive   Orientation Level Oriented to person   Memory Decreased recall of recent events;Decreased recall of precautions;Decreased short term memory   Following Commands Follows one step commands inconsistently   RUE Assessment   RUE Assessment WFL   LUE Assessment   LUE Assessment WFL   RLE Assessment   RLE Assessment WFL   Strength RLE   RLE Overall Strength 3/5   LLE Assessment   LLE Assessment WFL   Strength LLE   LLE Overall Strength 3/5   Wound 12/05/18 Skin tear Shoulder Left partial thickness of the left shoulder    Date First Assessed: 12/05/18   Pre-Existing Wound: Yes  Traumatic Wound Type: Skin tear  Location: Shoulder  Wound Location Orientation: Left  Wound Description (Comments): partial thickness of the left shoulder    Wound Description Clean;Fragile;Pink   Debi-wound Assessment Clean;Dry; Intact   Wound Length (cm) 2 5 cm   Wound Width (cm) 2 5 cm   Wound Depth (cm) 0 1   Calculated Wound Volume (cm^3) 0 62 cm^3   Drainage Amount Small   Drainage Description Serosanguineous; Serous   Non-staged Wound Description Partial thickness   Treatments Cleansed   Dressing Other (Comment)  (suggested dermagran )   Wound 12/05/18 Skin tear Knee Left partial thickness    Date First Assessed: 12/05/18   Pre-Existing Wound: Yes  Traumatic Wound Type: Skin tear  Location: Knee  Wound Location Orientation: Left  Wound Description (Comments): partial thickness    Wound Description Clean;Dry;Fragile;Pink   Debi-wound Assessment Clean;Dry; Intact   Wound Length (cm) 2 cm   Wound Width (cm) 3 cm   Wound Depth (cm) 0 1   Calculated Wound Volume (cm^3) 0 6 cm^3   Drainage Amount Scant   Drainage Description Serous   Non-staged Wound Description Partial thickness   Dressing (suggest dermagran )   Bed Mobility   Rolling R 3  Moderate assistance   Additional items Assist x 2; Increased time required;Verbal cues   Rolling L 3  Moderate assistance   Additional items Assist x 2; Increased time required;Verbal cues   Supine to Sit 2  Maximal assistance   Additional items Assist x 2; Increased time required;Verbal cues   Sit to Supine 2  Maximal assistance   Additional items Assist x 2; Increased time required;Verbal cues   Additional Comments Pt able to sit EOB x 2 min  pt requesting to lay back down during PT eval  Pt non ambulatory at baseline and transfers into and out of [facility] for mobility    Transfers   Sit to Stand Unable to assess   Additional Comments NA due to poor sitting tolerance and cog status  Pt requesting to lay back down in bed    Ambulation/Elevation   Gait pattern Not tested   Balance   Static Sitting Poor   Dynamic Sitting Poor -   Endurance Deficit   Endurance Deficit Yes   Endurance Deficit Description fatigue and pain    Activity Tolerance   Activity Tolerance Patient limited by fatigue;Patient limited by pain; Other (Comment)  (cog status )   Nurse Made Aware Pt appropriate to be seen and mobilize per nsg    Assessment   Prognosis Fair   Problem List Decreased strength;Decreased endurance; Impaired balance;Decreased mobility; Decreased range of motion;Decreased safety awareness; Impaired judgement;Decreased cognition;Pain;Decreased skin integrity   Assessment Pt is an 80 y o female that presented to B on 12/4/2018 s/p: being found on the ground at [facility]  Pt is seen for PT evaluation on 12/5/2018 with the primary dx: toxic metabolic encephalopathy, UTI, Ambulatory dysfunction    2 pt identifiers were utilized  Pt presents to PT session supine in bed  Pt is classified as high level complexity cased based on: current dx, on-going medical management, multiple lines, fall risk, and prior co-morbidities including: alzheimer disease, CHF, HTN, as well as personal factors including: , current cognitive status and baseline PLOF  Per case management, pt required assistance with ADLs, but she was independent with transfers to W/C  Independent utilizing  for mobility   Pt functional abilities are currently decreased from her baseline based on current impairments including: decreased cognition, gross bilateral LE and UE weakness, decreased static/dynamic sitting balance, decreased trunk control, and decreased safety awareness  These impairments are currently limiting her ability to perform functional activities including: bed mobility, static sitting activities, and supine<->sit transfer  Pt is currently mod A x2 for rolling in bed and max A x2 for supine <->sit transfer  Unable to assess further mobility due to cog status and pt requesting to " lay back down"  Pt educated on importance of OOB mobility however continues to decline  Despite constant VCs and TCs, pt was unable to follow instructions for mobility tasks  Pt assisted with mobility tasks once PT initiated movements  Pt prognosis is fair to return to her PLOF based on her current cognitive status, decrease functional abilities, and significant impairments limiting her strength and balance  Pt was left in supine in bed at the conclusion of PT session with bed alarm on, with call bell and all other needs within reach and she denies any further questions for PT at this time  PT will continue to follow this patient to improve functional mobility back to her PLOF  Once medically cleared, PT recommends rehab for D/C  Barriers to Discharge None   Goals   Patient Goals none stated secondary to cog status    STG Expiration Date 12/15/18   Short Term Goal #1 In 10 days pt will complete: 1) Bed mobility skills with min A x  2 to increase safety and independence as well as decrease caregiver burden  2) Improve balance grades to fair to increase safety with all mobility and decrease fall risk  3) Improve BLE strength by 1/2 grade to help increase overall functional mobility and decrease fall risk  4) PT for ongoing pt and family education; DME needs and D/C planning to promote highest level of function in least restrictive environment   Transfers and ambulation to be assessed when appropriate  PT to see at this time   Plan   Treatment/Interventions LE strengthening/ROM; Therapeutic exercise; Endurance training;Patient/family training;Equipment eval/education; Bed mobility;Cognitive reorientation;Continued evaluation;Spoke to nursing;Spoke to case management   PT Frequency Other (Comment)  (2-3x a week )   Recommendation   Recommendation Other (Comment)  (rehab )   Equipment Recommended Wheelchair   PT - OK to Discharge Yes  (to rehab when medically cleared )   Modified Wilmer Scale   Modified Swink Scale 5   Barthel Index   Feeding 5   Bathing 0   Grooming Score 0   Dressing Score 0   Bladder Score 0   Bowels Score 5   Toilet Use Score 0   Transfers (Bed/Chair) Score 0   Mobility (Level Surface) Score 0   Stairs Score 0   Barthel Index Score 10   PT assessment written by Cherri Mehta, SPT  Ghazal Felix present and assisted with PT eval  I have read and reviewed the above assessment and agree with the following documentation  Lul Zaldivar PT     Wound assessment in evaluation not completed by PT  Nsg entered data accidentally in PT section of flow sheet      Lul Zaldivar PT

## 2018-12-05 NOTE — SOCIAL WORK
Initial interview and DC Plan:     CM met with the patient to review the CM role and discus possible dc needs  Pt is only alert to self and is unable to complete interview  [facility] and spoke with Radha Fabricio @[phone]  Lynn Suárez states she currently lives on the dementia locked unit  She is nonambulatory and uses wheelchair to propel herself around the unit  Lynn Suárez states she wears gerisleeves as she is prone to skin tears  She states she also has a wound on buttocks as well as her Left leg  States she was found on the floor at the facility  She is [facility] and has 24 hour care  Primary contact: Ramiro Gamez (brother) [phone]     CM reviewed d/c planning process including the following: identifying help at home, patient preference for d/c planning needs, Discharge Lounge, [facility] Meds to Bed program, availability of treatment team to discuss questions or concerns patient and/or family may have regarding understanding medications and recognizing signs and symptoms once discharged  CM also encouraged patient to follow up with all recommended appointments after discharge  Patient advised of importance for patient and family to participate in managing patients medical well being  CM spoke with brother, Giacomo Escobar to explain the role of the CM and discharge plan  Giacomo Escobar states he is the POA and reviewed choices for discharge if needing SNF  Giacomo Escobar is agreeable to both [facility] and [facility]  Referrals entered into [facility] as such  Giacomo Escobar provided his email address for additional choices if needed to [email or web address]   Additional choices emailed  Spoke with Guinea at [facility], will be out to evaluate pt tomorrow

## 2018-12-05 NOTE — ASSESSMENT & PLAN NOTE
· Leukocytosis, urine culture with >100,000 gram negative martin  Previously had UTI/asx bacteriuria with ESBL  · ID consulted, appreciate input  Patient is not able to provide history 2/2 dementia  Procalcitonin elevated at 23 81-->25 97  Consideration to treat with Invanz due to prior hx of ESBL, d/w ID   · In setting of urinary retention s/p briones   · Follow-up final urine culture    Blood cultures pending x 2  · Monitor temps, WBC, clinical course  · Possibly contributing to encephalopathy

## 2018-12-05 NOTE — PLAN OF CARE
Problem: Potential for Falls  Goal: Patient will remain free of falls  INTERVENTIONS:  - Assess patient frequently for physical needs  -  Identify cognitive and physical deficits and behaviors that affect risk of falls    -  [place] fall precautions as indicated by assessment   - Educate patient/family on patient safety including physical limitations  - Instruct patient to call for assistance with activity based on assessment  - Modify environment to reduce risk of injury  - Consider OT/PT consult to assist with strengthening/mobility   Outcome: Progressing      Problem: PAIN - ADULT  Goal: Verbalizes/displays adequate comfort level or baseline comfort level  Interventions:  - Encourage patient to monitor pain and request assistance  - Assess pain using appropriate pain scale  - Administer analgesics based on type and severity of pain and evaluate response  - Implement non-pharmacological measures as appropriate and evaluate response  - Consider cultural and social influences on pain and pain management  - Notify physician/advanced practitioner if interventions unsuccessful or patient reports new pain   Outcome: Progressing      Problem: INFECTION - ADULT  Goal: Absence or prevention of progression during hospitalization  INTERVENTIONS:  - Assess and monitor for signs and symptoms of infection  - Monitor lab/diagnostic results  - Monitor all insertion sites, i e  indwelling lines, tubes, and drains  - Monitor endotracheal (as able) and nasal secretions for changes in amount and color  - [place] appropriate cooling/warming therapies per order  - Administer medications as ordered  - Instruct and encourage patient and family to use good hand hygiene technique  - Identify and instruct in appropriate isolation precautions for identified infection/condition   Outcome: Progressing    Goal: Absence of fever/infection during neutropenic period  INTERVENTIONS:  - Monitor WBC     Outcome: Progressing      Problem: SAFETY ADULT  Goal: Maintain or return to baseline ADL function  INTERVENTIONS:  -  Assess patient's ability to carry out ADLs; assess patient's baseline for ADL function and identify physical deficits which impact ability to perform ADLs (bathing, care of mouth/teeth, toileting, grooming, dressing, etc )  - Assess/evaluate cause of self-care deficits   - Assess range of motion  - Assess patient's mobility; develop plan if impaired  - Assess patient's need for assistive devices and provide as appropriate  - Encourage maximum independence but intervene and supervise when necessary  ¯ Involve family in performance of ADLs  ¯ Assess for home care needs following discharge   ¯ Request OT consult to assist with ADL evaluation and planning for discharge  ¯ Provide patient education as appropriate   Outcome: Progressing    Goal: Maintain or return mobility status to optimal level  INTERVENTIONS:  - Assess patient's baseline mobility status (ambulation, transfers, stairs, etc )    - Identify cognitive and physical deficits and behaviors that affect mobility  - Identify mobility aids required to assist with transfers and/or ambulation (gait belt, sit-to-stand, lift, walker, cane, etc )  - [place] fall precautions as indicated by assessment  - Record patient progress and toleration of activity level on Mobility SBAR; progress patient to next Phase/Stage  - Instruct patient to call for assistance with activity based on assessment  - Request Rehabilitation consult to assist with strengthening/weightbearing, etc    Outcome: Progressing      Problem: DISCHARGE PLANNING  Goal: Discharge to home or other facility with appropriate resources  INTERVENTIONS:  - Identify barriers to discharge w/patient and caregiver  - Arrange for needed discharge resources and transportation as appropriate  - Identify discharge learning needs (meds, wound care, etc )  - Arrange for interpretive services to assist at discharge as needed  - Refer to Case Management Department for coordinating discharge planning if the patient needs post-hospital services based on physician/advanced practitioner order or complex needs related to functional status, cognitive ability, or social support system   Outcome: Progressing      Problem: Knowledge Deficit  Goal: Patient/family/caregiver demonstrates understanding of disease process, treatment plan, medications, and discharge instructions  Complete learning assessment and assess knowledge base  Interventions:  - Provide teaching at level of understanding  - Provide teaching via preferred learning methods   Outcome: Progressing      Problem: Nutrition/Hydration-ADULT  Goal: Nutrient/Hydration intake appropriate for improving, restoring or maintaining nutritional needs  Monitor and assess patient's nutrition/hydration status for malnutrition (ex- brittle hair, bruises, dry skin, pale skin and conjunctiva, muscle wasting, smooth red tongue, and disorientation)  Collaborate with interdisciplinary team and initiate plan and interventions as ordered  Monitor patient's weight and dietary intake as ordered or per policy  Utilize nutrition screening tool and intervene per policy  Determine patient's food preferences and provide high-protein, high-caloric foods as appropriate       INTERVENTIONS:  - Monitor oral intake, urinary output, labs, and treatment plans  - Assess nutrition and hydration status and recommend course of action  - Evaluate amount of meals eaten  - Assist patient with eating if necessary   - Allow adequate time for meals  - Recommend/ encourage appropriate diets, oral nutritional supplements, and vitamin/mineral supplements  - Order, calculate, and assess calorie counts as needed  - Recommend, monitor, and adjust tube feedings and TPN/PPN based on assessed needs  - Assess need for intravenous fluids  - Provide specific nutrition/hydration education as appropriate  - Include patient/family/caregiver in decisions related to nutrition    Outcome: Progressing      Problem: Prexisting or High Potential for Compromised Skin Integrity  Goal: Skin integrity is maintained or improved  INTERVENTIONS:  - Identify patients at risk for skin breakdown  - Assess and monitor skin integrity  - Assess and monitor nutrition and hydration status  - Monitor labs (i e  albumin)  - Assess for incontinence   - Turn and reposition patient  - Assist with mobility/ambulation  - Relieve pressure over bony prominences  - Avoid friction and shearing  - Provide appropriate hygiene as needed including keeping skin clean and dry  - Evaluate need for skin moisturizer/barrier cream  - Collaborate with interdisciplinary team (i e  Nutrition, Rehabilitation, etc )   - Patient/family teaching   Outcome: Progressing

## 2018-12-05 NOTE — PROGRESS NOTES
Patient seen examined  The chart was reviewed  Full consult to follow  I have ordered a kidney and bladder ultrasound to assess the upper tracts

## 2018-12-05 NOTE — DISCHARGE INSTR - OTHER ORDERS
Skin Care orders:  1-Hydraguard to sacrum, buttocks and heels BID and PRN  2-Soft care cushion when out of bed in chair  3-Moisturize skin daily with skin nourishing cream  4-Elevate heels to offload pressure  5-Turn/reposition q2h for pressure re-distribution on skin  6   Left shoulder and left knee skin tears - cleanse with soap and water then apply dermagran top with 4 x 4 and change every 3 days

## 2018-12-05 NOTE — ASSESSMENT & PLAN NOTE
· Urinary retention, POA, ?complicated by UTI  · S/p briones decompression  · Urology consulted, appreciate input    F/u US bladder/kidney

## 2018-12-05 NOTE — ASSESSMENT & PLAN NOTE
· POA, Cr 1 32 now at 1 09 following IVF  · Likely 2/2 dehydration, urinary retention s/p briones placement   · Monitor BMP

## 2018-12-05 NOTE — CONSULTS
[address] NOTE   Admission Date: 12/4/2018    Patient Identifiers: Clyde Cruz (MRN: [id number])  Service Requesting Consultation: Debbie Collins  Service Providing Consultation:  Urology, Claudetta Harry, PA-C  Consults  Date of Service: 12/5/2018    Reason for Consultation:  Urology evaluation    History of Present Illness:     Clyde Cruz is a 80 y o  Female who was brought to the hospital with unresponsiveness from her facility  She had history of Alzheimer's dementia bronchitis and pneumonia  She has pulmonary fibrosis and is on prednisone  She was seen by Infectious Disease noted to have leukocytosis possibly secondary to urinary tract infection  She now has a Vargas catheter in place and a renal ultrasound was ordered  Urine culture growing greater than 100,000 E coli  Past Medical, Past Surgical History:     Past Medical History:   Diagnosis Date    Alzheimer disease     CHF [facility]congestive heart failure) ([facility] [address] )     Hypertension    :    History reviewed   No pertinent surgical history :    Medications, Allergies:     Current Facility-Administered Medications:     acetaminophen (TYLENOL) tablet 650 mg, 650 mg, Oral, Q8H HELENE, Glory Reid PA-C, 650 mg at 12/05/18 1402    aluminum-magnesium hydroxide-simethicone (MYLANTA) 200-200-20 mg/5 mL oral suspension 30 mL, 30 mL, Oral, Q6H PRN, Juan Antonio Sparks MD    divalproex sodium (DEPAKOTE SPRINKLE) capsule 125 mg, 125 mg, Oral, HS, Juan Antonio Sparks MD, 125 mg at 12/04/18 2143    docusate sodium (COLACE) capsule 100 mg, 100 mg, Oral, BID, Juan Antonio Sparks MD, 100 mg at 12/04/18 1454    enoxaparin (LOVENOX) subcutaneous injection 30 mg, 30 mg, Subcutaneous, Daily, Juan Antonio Sparks MD, 30 mg at 12/05/18 0914    [START ON 12/31/2018] ergocalciferol (VITAMIN D2) capsule 50,000 Units, 50,000 Units, Oral, Q28 Days, Juan Antonio Sparks MD    ertapenem Mel Drake) 1,000 mg in sodium chloride 0 9 % 50 mL IVPB, 1,000 mg, Intravenous, Q24H, Yael Ross MD, Last Rate: 100 mL/hr at 12/05/18 1055, 1,000 mg at 12/05/18 1055    influenza vaccine, high-dose (FLUZONE HIGH-DOSE) IM injection AUREA 0 5 mL, 0 5 mL, Intramuscular, Prior to discharge, [facility] [address],     multivitamin-minerals (CENTRUM) tablet 1 tablet, 1 tablet, Oral, Daily, Jackeline Salas MD, 1 tablet at 12/05/18 0913    ondansetron (ZOFRAN) injection 4 mg, 4 mg, Intravenous, Q6H PRN, Jackeline Salas MD    predniSONE tablet 10 mg, 10 mg, Oral, Daily, Jackeline Salas MD, 10 mg at 12/05/18 0913    propranolol (INDERAL) tablet 20 mg, 20 mg, Oral, Q12H [address], Jackeline Salas MD, 20 mg at 12/05/18 0913    senna (SENOKOT) tablet 8 6 mg, 1 tablet, Oral, Daily, Jackeline Salas MD, 8 6 mg at 12/04/18 1454    Allergies: Allergies   Allergen Reactions    Sertraline Other (See Comments)     zoloft   :    Social and Family History:   Social History:   Social History   Substance Use Topics    Smoking status: Former Smoker     Packs/day: 1 00    Smokeless tobacco: Never Used    Alcohol use No        History   Smoking Status    Former Smoker    Packs/day: 1 00   Smokeless Tobacco    Never Used       Family History:  History reviewed  No pertinent family history :     Review of Systems:     General: Fever, chills, or night sweats: negative  Cardiac: Negative for chest pain  Pulmonary: Negative for shortness of breath  Gastrointestinal: Abdominal pain negative  Nausea, vomiting, or diarrhea negative,  Genitourinary: See HPI above  Patient does not have hematuria  All other systems queried were negative  Physical Exam:   General: Patient is pleasant and in NAD   Awake and alert  /65 (BP Location: Left arm)   Pulse 77   Temp 98 5 °F (36 9 °C) (Axillary)   Resp 18   Ht 5' 6" (1 676 m)   Wt 54 4 kg (120 lb)   SpO2 100%   BMI 19 37 kg/m²   Cardiac: Peripheral edema: negative  Pulmonary: Non-labored breathing  Abdomen: Soft, non-tender, non-distended  No surgical scars  No masses, tenderness, hernias noted  Genitourinary: Negative CVA tenderness, negative suprapubic tenderness  BENITES: in place draining clear yellow urine  no clots and       Labs:     Lab Results   Component Value Date    HGB 10 4 (L) 12/05/2018    HCT 34 7 (L) 12/05/2018    WBC 19 17 (H) 12/05/2018     12/05/2018   ]    Lab Results   Component Value Date    K 3 6 12/05/2018     12/05/2018    CO2 24 12/05/2018    BUN 47 (H) 12/05/2018    CREATININE 1 09 12/05/2018    CALCIUM 9 2 12/05/2018   ]    Imaging:   I personally reviewed the images and report of the following studies, and reviewed them with the patient:   renal ultrasound pending    ASSESSMENT:       1  Fever   2  Encephalopathy   3  Alzheimer's dementia    PLAN:    follow-up on renal ultrasound   trial of voiding once mental status improved and is able to be out of bed      Thank you for allowing me to participate in this patients care  Please do not hesitate to call with any additional questions    Delio Chisholm PA-C

## 2018-12-05 NOTE — UTILIZATION REVIEW
Initial Clinical Review    Admission: Date/Time/Statement: 18 0902    Orders Placed This Encounter   Procedures    Place in Observation (expected length of stay for this patient is less than two midnights)     Standing Status:   Standing     Number of Occurrences:   1     Order Specific Question:   Admitting Physician     Answer:   Radu Monroe [[id number]]     Order Specific Question:   Level of Care     Answer:   Med Surg [[id number]]           ED Arrival Information     Expected Arrival Acuity Means of Arrival Escorted By Service Admission Type    2018 05:25 2018 05:25 Urgent Ambulance [address] Urgent    Arrival Complaint    Fall          Chief Complaint   Patient presents with    Fall     Pt was found on floor next to her bed as reported by staff at [facility]  Pt has no complaints at this time  History of Illness:     80year old female with alzheimer dementia  presents to ed from nursing facility for unresponsiveness  Recently evaluatedf or bronchitis and pneumonia  She has pulmonary fibrosis  And is on prednisone  In the ed she is lethargic  ED Triage Vitals [18 0537]   98 2 °F (36 8 °C) 87 18 155/81 97 %      No Pain       18 54 4 kg (120 lb)           Abnormal Labs/Diagnostic Test Results:    jvd present -  blle edema  Present     EK bmp, no st-t wave changes, irregularly irregular, PVCs, PACs  Troponin  05   04  BNP   9,919       Urinalysis   Moderate blood, positive nitrite, large leukocytes, wbc 30-50 , innumerable bacteria   Urine Culture >100,000 cfu/ml Gram Negative Pascual Enteric Like (A)     Bun 53  Creatinine  53     Wbc  23 12     Blood culture x 2____  procalcitonin  23 81     GREATER than 2 ng/mL:   high risk for severe sepsis / septic shock; antibiotics strongly ENCOURAGED         ED Treatment:   Medication Administration from 2018 0525 to 2018 1627       Date/Time Order Dose Route     2018 0271 ceftriaxone (ROCEPHIN) 1 g/50 mL in dextrose IVPB 1,000 mg Intravenous     12/04/2018 1453 multivitamin-minerals (CENTRUM) tablet 1 tablet 1 tablet Oral     12/04/2018 1454 predniSONE tablet 10 mg 10 mg Oral     12/04/2018 1453 propranolol (INDERAL) tablet 20 mg 20 mg Oral     12/04/2018 1454 docusate sodium (COLACE) capsule 100 mg 100 mg Oral     12/04/2018 1454 senna (SENOKOT) tablet 8 6 mg 8 6 mg Oral     12/04/2018 1453 enoxaparin (LOVENOX) subcutaneous injection 30 mg 30 mg Subcutaneous       Past Medical/Surgical History:     Diagnosis    Essential hypertension    Dementia without behavioral disturbance    Pneumonia    Multiple fractures    Ambulatory dysfunction    Acute bronchopneumonia    AAA [facility] aneurysm[facility])    Chronic combined systolic and diastolic heart failure ([facility])    Osteoporosis    Pulmonary fibrosis ([facility])    PMR (polymyalgia rheumatica) ([facility])    Tobacco use disorder    Bilateral edema of lower extremity    Heart failure ([facility])           Admitting Diagnosis:       rinary retention [R33 9]  Urinary tract infection [N39 0]  Pain [R52]  YANNI (acute kidney injury) ([facility] [address] ) [N17 9]  Ambulatory dysfunction [R26 2]  Late onset Alzheimer's disease without behavioral disturbance [G30 1, F02 80]    Age/Sex: 80 y o  female    Assessment/Plan:     Consult infectious disease  For  Urinary tract infection and possibly  A secondary infection  Start iv ertapenum  and follow blood and urine cultures  As patient is afebrile  Check procalcitonin  Level   Vargas catheter placed   For urinary retention and acute kidney injury   Change in mental status likely related to metabolic issues or infection      Pulmonary fibrosis with recent treatment of pneumonia   Continue steroid taper    Acute on chronic CHF  With positive troponin  And BNP  9,919 :   Telemetry, trend troponin, start diuresis, consult cardiology if not improvement, chest x ray, bmp       Admission Orders:    Consult infectious disease  Consult gerontology  Consult wound culture   Telemetry   Consult urology   PT and OT eval and treat   US kidney  And bladder       Scheduled Meds:     acetaminophen 650 mg Oral Q6H PRN   aluminum-magnesium hydroxide-simethicone 30 mL Oral Q6H PRN   divalproex sodium 125 mg Oral HS   docusate sodium 100 mg Oral BID   enoxaparin 30 mg Subcutaneous Daily   [START ON 12/31/2018] ergocalciferol 50,000 Units Oral Q28 Days   ertapenem 1,000 mg Intravenous Q24H   influenza vaccine 0 5 mL Intramuscular Prior to discharge   multivitamin-minerals 1 tablet Oral Daily   ondansetron 4 mg Intravenous Q6H PRN   predniSONE 10 mg Oral Daily   propranolol 20 mg Oral Q12H HELENE   senna 1 tablet Oral Daily

## 2018-12-05 NOTE — CONSULTS
Progress Note - Wound   Kinsey Naseem 80 y o  female MRN: [id number]  Unit/Bed#: [facility] 901-01 Encounter: [id number]      Assessment: Patient seen for wound care consult   Patient is evaluated while in bed and is confused   She is rigid and stiff for rolling in the bed   Alert to herself but not to place and time   Patient is resident of [facility]   Assessment Findings :  1  Bilateral lower legs with scattered dry eschar areas related to partial thickness skin tears   2  Right knee - partial thickness dry skin tear present on admission   3  Left anterior shoulder - partial thickness skin tear present on admission   4  Bilateral heels intact   5  Sacral / buttocks with 2 areas of dry scabbed closed intact skin   Blanchable upon the assessment   Discussed plan of care with the bedside RN Jared Calvillo   Patient turned and offload with wedges   Elevation of the heels   Skin Care orders:  1-Hydraguard to sacrum, buttocks and heels BID and PRN  2-Soft care cushion when out of bed in chair  3-Moisturize skin daily with skin nourishing cream  4-Elevate heels to offload pressure  5-Turn/reposition q2h for pressure re-distribution on skin  6  Left shoulder and left knee skin tears - cleanse with soap and water then apply dermagran top with 4 x 4 and change every 3 days             Objective:    Vitals: Blood pressure 151/60, pulse 67, temperature 97 6 °F (36 4 °C), temperature source Axillary, resp  rate 18, height 5' 6" (1 676 m), weight 54 4 kg (120 lb), SpO2 98 %  ,Body mass index is 19 37 kg/m²  Pressure Ulcer 11/17/17 Buttocks Right (Active)       Wound 12/05/18 Skin tear Shoulder Left partial thickness of the left shoulder  (Active)   Wound Description Clean;Fragile;Pink 12/5/2018  2:16 PM   Debi-wound Assessment Clean;Dry; Intact 12/5/2018  2:16 PM   Wound Length (cm) 2 5 cm 12/5/2018  2:16 PM   Wound Width (cm) 2 5 cm 12/5/2018  2:16 PM   Wound Depth (cm) 0 1 12/5/2018  2:16 PM   Calculated Wound Volume (cm^3) 0 62 cm^3 12/5/2018  2:16 PM   Drainage Amount Small 12/5/2018  2:16 PM   Drainage Description Serosanguineous; Serous 12/5/2018  2:16 PM   Non-staged Wound Description Partial thickness 12/5/2018  2:16 PM   Treatments Cleansed 12/5/2018  2:16 PM   Dressing Other (Comment) 12/5/2018  2:16 PM       Wound 12/05/18 Skin tear Knee Left partial thickness  (Active)   Wound Description Clean;Dry;Fragile;Pink 12/5/2018  2:16 PM   Debi-wound Assessment Clean;Dry; Intact 12/5/2018  2:16 PM   Wound Length (cm) 2 cm 12/5/2018  2:16 PM   Wound Width (cm) 3 cm 12/5/2018  2:16 PM   Wound Depth (cm) 0 1 12/5/2018  2:16 PM   Calculated Wound Volume (cm^3) 0 6 cm^3 12/5/2018  2:16 PM   Drainage Amount Scant 12/5/2018  2:16 PM   Drainage Description Serous 12/5/2018  2:16 PM   Non-staged Wound Description Partial thickness 12/5/2018  2:16 PM         Wound care will sign off - call with questions [phone] or Ceasar Gibbs RN BSN [id number]

## 2018-12-05 NOTE — PROGRESS NOTES
Progress Note - Infectious Disease   Jose Alfredo Duran 80 y o  female MRN: [id number]  Unit/Bed#: [facility] 901-01 Encounter: [id number]      Impression/Plan:  1  Leukocytosis-probable secondary to urinary tract infection  Possibly secondary to another infectious or noninfectious etiology  Fortunately the patient remains hemodynamically stable and nontoxic despite the leukocytosis  She has been afebrile and does not have any current symptomatology   -antibiotics as below  -follow-up blood cultures and urine cultures  -recheck procalcitonin level tomorrow a m   -additional workup as needed     2  Probable urinary tract infection-the procalcitonin level is quite elevated and the leukocytosis persists  Urine culture is growing an E coli  Patient denies having any urinary symptoms but she has a Vargas catheter in place now  She has also been afebrile and hemodynamically stable  She has been previously at least colonized or infected with an ESBL E coli   -ertapenem 1 g IV Q 24 hr  -will need to watch the patient clinically as the ertapenem will decrease the level of the valproic acid  -no reported history of a seizure disorder so not sure why the patient is on the valproic acid but the primary service will investigate  -followup urine culture and adjust antibiotics as needed     3  Acute urinary retention-possibly complicated by UTI  Unclear etiology  The patient has had a Vargas catheter placed   -Vargas drainage  -urology evaluation pending  -no additional ID workup for now     4  Elevated serum creatinine-likely multifactorial including obstruction, and possible pre renal issues  Renal function has improved  -Vargas drainage  -volume management  -recheck BMP tomorrow     5  Acute encephalopathy-in the setting of baseline Alzheimer dementia  Suspect metabolic issues, and urinary retention playing a role  Possible infection as above  Patient's cognition has apparently abruptly improved    Unclear baseline   -monitor cognition  -treat metabolic issues  -follow-up urine culture and blood culture    Discussed in detail with Urology in the primary service    Antibiotics:  Ceftriaxone x1 dose    Subjective:  Patient has no fever, chills, sweats; no nausea, vomiting, diarrhea; no cough, shortness of breath; no pain  No new symptoms  She remains hemodynamically stable  Objective:  Vitals:  Temp:  [97 9 °F (36 6 °C)-98 8 °F (37 1 °C)] 98 4 °F (36 9 °C)  HR:  [62-91] 64  Resp:  [16-18] 18  BP: (110-160)/(56-80) 158/60  SpO2:  [94 %-99 %] 98 %  Temp (24hrs), Av 4 °F (36 9 °C), Min:97 9 °F (36 6 °C), Max:98 8 °F (37 1 °C)  Current: Temperature: 98 4 °F (36 9 °C)    Physical Exam:   General Appearance:  Alert, confused, able to answer very simple yes no questions   Throat: Oropharynx dry without lesions  Lungs:   Decreased breath sounds bilaterally; no wheezes, rhonchi or rales; respirations unlabored   Heart:  RRR; no murmur, rub or gallop   Abdomen:   Soft, non-tender, non-distended, positive bowel sounds  Extremities: No clubbing, cyanosis or edema   Skin: No new rashes or lesions  No draining wounds noted         Labs, Imaging, & Other studies:   All pertinent labs and imaging studies were personally reviewed    Results from last 7 days  Lab Units 18  0438 18  1326 18  0654   WBC Thousand/uL 19 17*  --  23 12*   HEMOGLOBIN g/dL 10 4*  --  11 1*   PLATELETS Thousands/uL 285 275 277       Results from last 7 days  Lab Units 18  0438 18  0654   SODIUM mmol/L 138 136   POTASSIUM mmol/L 3 6 4 3   CHLORIDE mmol/L 106 104   CO2 mmol/L 24 27   BUN mg/dL 47* 53*   CREATININE mg/dL 1 09 1 32*   EGFR ml/min/1 73sq m 46 37   CALCIUM mg/dL 9 2 9 4   AST U/L 21  --    ALT U/L 17  --    ALK PHOS U/L 64  --        Results from last 7 days  Lab Units 18  0657   URINE CULTURE  >100,000 cfu/ml Gram Negative Pascual Enteric Like*     Procalcitonin 25 97

## 2018-12-05 NOTE — PROGRESS NOTES
Progress Note - Erasmo Mendez 7/7/1932, 80 y o  female MRN: [id number]    Unit/Bed#: [facility] 901-01 Encounter: [id number]    Primary Care Provider: Mary Samson MD   Date and time admitted to hospital: 12/4/2018  5:25 AM      * Toxic metabolic encephalopathy   Assessment & Plan    · Patient had change in mentation at her facility and was transferred here  Per ED notes, patient was agitated and then found on the floor of her room  · CT head negative for acute intracranial abnormality  · Patient does have hx of dementia - consult geriatrics  · Etiology likely 2/2 UTI, YANNI, urinary rention - see related plans   · Monitor     UTI (urinary tract infection)   Assessment & Plan    · Leukocytosis, urine culture with >100,000 gram negative martin  Previously had UTI/asx bacteriuria with ESBL  · ID consulted, appreciate input  Patient is not able to provide history 2/2 dementia  Procalcitonin elevated at 23 81-->25 97  Consideration to treat with Invanz due to prior hx of ESBL, d/w ID   · In setting of urinary retention s/p briones   · Follow-up final urine culture  Blood cultures pending x 2  · Monitor temps, WBC, clinical course  · Possibly contributing to encephalopathy     Urinary retention   Assessment & Plan    · Urinary retention, POA, ?complicated by UTI  · S/p briones decompression  · Urology consulted, appreciate input    F/u US bladder/kidney     YANNI (acute kidney injury) ([facility] [address] )   Assessment & Plan    · POA, Cr 1 32 now at 1 09 following IVF  · Likely 2/2 dehydration, urinary retention s/p briones placement   · Monitor BMP      Pulmonary fibrosis ([facility] [address] )   Assessment & Plan    · Recently treated for pneumonia, bronchitis, possible reason for steroid taper  · Would continue with steroid taper  · Consider prednisone taper to be contributing to leukocytosis  · Continue to give supportive care and oxygen as needed     Chronic combined systolic and diastolic heart failure (HCC)   Assessment & Plan    · BNP is elevated, Troponin at 0 05-->0 04  · Appears euvolemic on exam, CXR negative for acute cardiopulmonary disease  · Cardiology stopped all diuretics in past   · Echo from 2017 shows EF 55%, no regional wall motion abnormalities  · Monitor     Ambulatory dysfunction   Assessment & Plan    · PT/OT evaluations     Dementia without behavioral disturbance   Assessment & Plan    · Patient has alzheimer's dementia with likely progression  · Follows with Dr Raudel Levy with geriatrics  · Geriatrics consult     Essential hypertension   Assessment & Plan    · Continue with propranolol 20 mg q12hr         VTE Pharmacologic Prophylaxis:   Pharmacologic: Enoxaparin (Lovenox)  Mechanical VTE Prophylaxis in Place: No    Patient Centered Rounds: I have performed bedside rounds with nursing staff today  Discussions with Specialists or Other Care Team Provider: RN, ID    Education and Discussions with Family / Patient: patient    Time Spent for Care: 30 minutes  More than 50% of total time spent on counseling and coordination of care as described above  Current Length of Stay: 0 day(s)    Current Patient Status: Observation   Certification Statement: continue obs will d/w UR, may need IV abx, needs further work-up    Discharge Plan: not medically stable    Code Status: Level 1 - Full Code      Subjective:   History not obtainable due to patient  She repeatedly states "help me" but cannot say what she needs help with  She is denying pain however  Objective:     Vitals:   Temp (24hrs), Av 4 °F (36 9 °C), Min:97 9 °F (36 6 °C), Max:98 8 °F (37 1 °C)    Temp:  [97 9 °F (36 6 °C)-98 8 °F (37 1 °C)] 98 4 °F (36 9 °C)  HR:  [62-91] 64  Resp:  [16-18] 18  BP: (110-160)/(56-80) 158/60  SpO2:  [94 %-99 %] 98 %  Body mass index is 19 37 kg/m²  Input and Output Summary (last 24 hours):        Intake/Output Summary (Last 24 hours) at 18 0932  Last data filed at 18 0900   Gross per 24 hour   Intake              298 ml   Output 775 ml   Net             -477 ml       Physical Exam:     Physical Exam   Constitutional: She appears well-developed and well-nourished  No distress  HENT:   Head: Normocephalic and atraumatic  Mouth/Throat: Oropharynx is clear and moist    Eyes: Pupils are equal, round, and reactive to light  EOM are normal    Neck: Normal range of motion  Neck supple  Cardiovascular: Normal rate, regular rhythm and normal heart sounds  No murmur heard  Pulmonary/Chest: Breath sounds normal  No respiratory distress  She has no wheezes  Poor effort   Abdominal: Soft  Bowel sounds are normal  She exhibits no distension  There is no tenderness  Musculoskeletal: Normal range of motion  She exhibits no edema or tenderness  Neurological: She is alert  No cranial nerve deficit  Not oriented   Skin: Skin is warm and dry  She is not diaphoretic  Multiple ecchymotic areas noted of extremities   Psychiatric:   Restless   Vitals reviewed  Additional Data:     Labs:      Results from last 7 days  Lab Units 12/05/18  0438  12/04/18  0654   WBC Thousand/uL 19 17*  --  23 12*   HEMOGLOBIN g/dL 10 4*  --  11 1*   HEMATOCRIT % 34 7*  --  35 5   PLATELETS Thousands/uL 285  < > 277   BANDS PCT %  --   --  9*   NEUTROS PCT % 88*  --   --    LYMPHS PCT % 6*  --   --    LYMPHO PCT %  --   --  2*   MONOS PCT % 5  --   --    MONO PCT %  --   --  3*   EOS PCT % 0  --  0   < > = values in this interval not displayed  Results from last 7 days  Lab Units 12/05/18  0438   SODIUM mmol/L 138   POTASSIUM mmol/L 3 6   CHLORIDE mmol/L 106   CO2 mmol/L 24   BUN mg/dL 47*   CREATININE mg/dL 1 09   ANION GAP mmol/L 8   CALCIUM mg/dL 9 2   ALBUMIN g/dL 2 5*   TOTAL BILIRUBIN mg/dL 0 40   ALK PHOS U/L 64   ALT U/L 17   AST U/L 21   GLUCOSE RANDOM mg/dL 88                   Results from last 7 days  Lab Units 12/05/18  0438 12/04/18  1352   PROCALCITONIN ng/ml 25 97* 23 81*           * I Have Reviewed All Lab Data Listed Above    * Additional Pertinent Lab Tests Reviewed: All Labs Within Last 24 Hours Reviewed    Imaging:    Imaging Reports Reviewed Today Include: CXR  Imaging Personally Reviewed by Myself Includes:  CXR    Recent Cultures (last 7 days):       Results from last 7 days  Lab Units 12/04/18  0657   URINE CULTURE  >100,000 cfu/ml Gram Negative Pascual Enteric Like*       Last 24 Hours Medication List:     Current Facility-Administered Medications:  acetaminophen 650 mg Oral Q6H PRN Jillian Clark MD   aluminum-magnesium hydroxide-simethicone 30 mL Oral Q6H PRN Jillian Clark MD   divalproex sodium 125 mg Oral HS Jillian Clark MD   docusate sodium 100 mg Oral BID Jillian Clark MD   enoxaparin 30 mg Subcutaneous Daily Jillian Clark MD   [START ON 12/31/2018] ergocalciferol 50,000 Units Oral Q28 Days Jillian Clark MD   influenza vaccine 0 5 mL Intramuscular Prior to discharge Fredrick David DO   multivitamin-minerals 1 tablet Oral Daily Jillian Clark MD   ondansetron 4 mg Intravenous Q6H PRN Jillian Clark MD   predniSONE 10 mg Oral Daily Jillian Clark MD   propranolol 20 mg Oral Q12H [facility] Jillian Clark MD   senna 1 tablet Oral Daily Jillian Clark MD        Today, Patient Was Seen By: Octavia Medina PA-C    ** Please Note: Dictation voice to text software may have been used in the creation of this document   **

## 2018-12-05 NOTE — CONSULTS
Consultation - Geriatrics   Letty Hunter 80 y o  female MRN: [id number]  Unit/Bed#: [facility] 901-01 Encounter: [id number]      Assessment/Plan  1  Delirium/encephalopathy  Multifactorial  Continue treating underlying conditions  Recommend taper of steroid, as steroids can make agitation and confusion worse  Will add Tylenol 650 mg Q 8 as uncontrolled pain can cause delirium  Continue with patient's home Depakote Sprinkle  Continue with bowel regimen as ordered  Monitor for urinary retention closely, as can make delirium worse, bladder scan and straight cath p r n  Redirect unwanted patient behaviors as first line tx  Reorient patient frequently  Avoid deliriogenic meds including tramadol, benzodiazepines, benadryl  Good sleep hygiene important, limit night time interruptions  Encourage patient to stay awake during the day  Ensure adequate hydration/nutrition  Mobilize often     2  Alzheimer's dementia  Patient of Dr Krishan Salazar, who performs home visits, last seen December 3, 2018  Agree with recs detailed in Dr Louis Munoz note   Is AO x self, has not been recognizing family members  Continue supportive care     3  Ambulatory dysfunction  Patient uses wheelchair  Continue supportive care  Probably no skillable need, PT/OT pending    4  Debility  Multifactorial  Continue supportive care  Given advanced dementia, no longer recognizing family members, consider performing POLST with family to establish goals of care    5  Fall  Fall precautions  Home meds unremarkable  Patient on vitamin D supplementation  PT, OT    6  Urinary incontinence  Monitor for urinary retention, which patient did have on admission  Bladder scan/straight cath p r n     7   UTI  Infectious Disease following    8  YANNI  Multifactorial  Encourage patient to drink p o   Fluids, will need constant reminders given advanced dementia       History of Present Illness   Physician Requesting Consult: Salina Bamberger, MD  Reason for Consult / Principal Problem: encephalopathy  Hx and PE limited by: n/a  HPI: Maynor Rivas is a 80y o  year old female with past medical history Alzheimer's disease, who presents to [facility] after falling  According to patient's nursing facility, patient had been agitated earlier in the evening  Patient was admitted under slim for pulmonary fibrosis, allegedly patient was on steroids, which may have made mental status worse  Patient was also admitted for acute on chronic combined systolic and diastolic CHF, ambulatory dysfunction, dementia without behavioral disturbance, UTI, urinary retention, YANNI  Patient lives at [facility]  Has assistance with ADLS, IADLS  Uses wheelchair  Is incontinent of urine  Patient is A*O x self at baseline  Has not been recognizing family  Inpatient consult to Gerontology  Consult performed by: Kely Farooq ordered by: Boubacar Guardian          Review of Systems   Unable to perform ROS: Dementia       Historical Information   Past Medical History:   Diagnosis Date    Alzheimer disease     CHF (congestive heart failure) ([facility] [address] )     Hypertension      History reviewed  No pertinent surgical history    Social History   History   Alcohol Use No     History   Drug Use No     History   Smoking Status    Former Smoker    Packs/day: 1 00   Smokeless Tobacco    Never Used         Family History: non-contributory    Meds/Allergies   Current meds:   Current Facility-Administered Medications   Medication Dose Route Frequency    acetaminophen (TYLENOL) tablet 650 mg  650 mg Oral Q6H PRN    aluminum-magnesium hydroxide-simethicone (MYLANTA) 200-200-20 mg/5 mL oral suspension 30 mL  30 mL Oral Q6H PRN    divalproex sodium (DEPAKOTE SPRINKLE) capsule 125 mg  125 mg Oral HS    docusate sodium (COLACE) capsule 100 mg  100 mg Oral BID    enoxaparin (LOVENOX) subcutaneous injection 30 mg  30 mg Subcutaneous Daily    [START ON 12/31/2018] ergocalciferol (VITAMIN D2) capsule 50,000 Units 50,000 Units Oral Q28 Days    ertapenem (INVanz) 1,000 mg in sodium chloride 0 9 % 50 mL IVPB  1,000 mg Intravenous Q24H    influenza vaccine, high-dose (FLUZONE HIGH-DOSE) IM injection AUREA 0 5 mL  0 5 mL Intramuscular Prior to discharge    multivitamin-minerals (CENTRUM) tablet 1 tablet  1 tablet Oral Daily    ondansetron (ZOFRAN) injection 4 mg  4 mg Intravenous Q6H PRN    predniSONE tablet 10 mg  10 mg Oral Daily    propranolol (INDERAL) tablet 20 mg  20 mg Oral Q12H HELENE    senna (SENOKOT) tablet 8 6 mg  1 tablet Oral Daily      Current PTA meds:  Prescriptions Prior to Admission   Medication    divalproex sodium (DEPAKOTE SPRINKLE) 125 MG capsule    ergocalciferol (VITAMIN D2) 50,000 units    Multiple Vitamins-Minerals (CEROVITE PO)    PHYTOPLEX Z-GUARD 57-17 % PSTE    predniSONE 10 mg tablet    propranolol (INDERAL) 20 mg tablet        Allergies   Allergen Reactions    Sertraline Other (See Comments)     zoloft       Objective   Vitals: Blood pressure 158/60, pulse 64, temperature 98 4 °F (36 9 °C), temperature source Oral, resp  rate 18, height 5' 6" (1 676 m), weight 54 4 kg (120 lb), SpO2 98 %  ,Body mass index is 19 37 kg/m²  Physical Exam   Constitutional: She appears well-developed and well-nourished  No distress  Scattered bruises   HENT:   Head: Normocephalic and atraumatic  Mouth/Throat: No oropharyngeal exudate  Eyes: Conjunctivae and EOM are normal  No scleral icterus  Neck: Neck supple  Cardiovascular: Normal rate  Pulmonary/Chest: Effort normal and breath sounds normal  She has no wheezes  She has no rales  Abdominal: Soft  Bowel sounds are normal  She exhibits no distension  Musculoskeletal: She exhibits no edema  Neurological: She is alert  Skin: Skin is warm and dry  Psychiatric: She is not agitated and not actively hallucinating  Cognition and memory are impaired  She expresses inappropriate judgment   She exhibits abnormal recent memory and abnormal remote memory  Patient alert, not oriented  Slurred speech, difficult understands  Baseline dementia  Has been delirious, agitated  This is currently resolved   Nursing note and vitals reviewed  Lab Results:   Results from last 7 days  Lab Units 12/05/18  0438   WBC Thousand/uL 19 17*   HEMOGLOBIN g/dL 10 4*   HEMATOCRIT % 34 7*   PLATELETS Thousands/uL 285        Results from last 7 days  Lab Units 12/05/18  0438   POTASSIUM mmol/L 3 6   CHLORIDE mmol/L 106   CO2 mmol/L 24   BUN mg/dL 47*   CREATININE mg/dL 1 09   CALCIUM mg/dL 9 2   ALK PHOS U/L 64   ALT U/L 17   AST U/L 21       Imaging Studies: I have personally reviewed pertinent reports  EKG, Pathology, and Other Studies: I have personally reviewed pertinent reports      VTE Prophylaxis: Sequential compression device (Venodyne)     Code Status: Level 1 - Full Code

## 2018-12-06 NOTE — MALNUTRITION/BMI
This medical record reflects one or more clinical indicators suggestive of malnutrition and/or morbid obesity  Malnutrition Findings:   Malnutrition type: Chronic illness (Mild malnutriiton r/t Alz Dementia as evidenced by fat/muscle depletion of orbital/temple areas, consuming <50-75% estimated energy needs  Treated with supplements)  Degree of Malnutrition: Malnutrition of mild degree  Malnutrition Characteristics: Fat loss, Muscle loss, Inadequate energy, Weight loss    BMI Findings: Body mass index is 18 61 kg/m²  See Nutrition note dated 12/6/18 for additional details  Completed nutrition assessment is viewable in the nutrition documentation

## 2018-12-06 NOTE — ASSESSMENT & PLAN NOTE
· Urinary retention, POA, ?complicated by UTI  · S/p briones decompression  · Urology consulted, appreciate input    US kidney/bladder shows echogenic, mildly atrophic kidneys, no hydronephrosis  · Urology recommends voiding trial when mental status improved and more mobile

## 2018-12-06 NOTE — PLAN OF CARE
Problem: OCCUPATIONAL THERAPY ADULT  Goal: Performs self-care activities at highest level of function for planned discharge setting  See evaluation for individualized goals  Treatment Interventions: ADL retraining, Functional transfer training, UE strengthening/ROM, Endurance training, Cognitive reorientation, Patient/family training, Equipment evaluation/education, Compensatory technique education, Activityengagement          See flowsheet documentation for full assessment, interventions and recommendations  Limitation: Decreased ADL status, Decreased Safe judgement during ADL, Decreased endurance, Decreased cognition, Decreased self-care trans  Prognosis: Fair  Assessment: Pt is a 80 y o  female who was admitted to [facility] on 12/4/2018 with Toxic metabolic encephalopathy   Pt's problem list also includes PMH of HTN, dementia and chf  At baseline pt was completing adls with assist from facility staff - was able to transfer to / and self propel  Pt lives at [address]  Currently pt requires max assist for overall ADLS and mod to max a x 2  for functional mobility/transfers  Pt currently presents with impairments in the following categories -difficulty performing ADLS, limited insight into deficits, flat affect, decreased initiation and engagement  and health management  activity tolerance, endurance, standing balance/tolerance, sitting balance/tolerance, arousal, memory, insight, safety , judgement , attention  and sequencing   These impairments, as well as pt's fatigue, pain and risk for falls  limit pt's ability to safely engage in all baseline areas of occupation, includingeating, grooming, bathing, dressing, toileting, functional mobility/transfers, social participation  and leisure activities  From OT standpoint, recommendations will b e dependent upon families decision for ongoing intervention (palliative care consult pending)   OT will continue to follow to address the below stated goals        OT Discharge Recommendation: Short Term Rehab (vs return to facility with increased support )

## 2018-12-06 NOTE — ASSESSMENT & PLAN NOTE
· Recently treated for pneumonia, bronchitis, possible reason for steroid taper  · Would continue with steroid taper - decrease from 10 mg to 5 mg today as this can contribute to agitation/delirium/confusion, continue to taper  · Consider prednisone taper to be contributing to leukocytosis  · Continue to give supportive care and oxygen as needed

## 2018-12-06 NOTE — SOCIAL WORK
Cm informed by palliative care  that patient's family would like to pursue hospice services  Cm informed that the patient's family would like the patient returned to [facility] with hospice services  Cm contacted the patient's brother [facility], we spoke about wishes for discharge  Per [facility] he would be in agreement with referral to [address] [place] [address] for possible admission to their program for the patient    Cm awaiting determination from hospice program

## 2018-12-06 NOTE — SOCIAL WORK
Cm reviewed patient doing care coordination rounds  The patient is not medically stable for discharge at this time  Cm informed that the patient will be evaluated by palliative care for further discharge planning  Cm awaiting medical clearance and final discharge recommendations

## 2018-12-06 NOTE — PROGRESS NOTES
Palliative Care is consulted to help clarify goals of care  I reached out to the patient's brother and Nathaniel Diaz, as patient is with advanced-stage dementia and is unable to participate in any decision making conversation  Ru Taylor requested to meet later at her room at 12:30pm     Formal consult note to follow      Radha Munoz MD  Palliative Care Medicine Fellow

## 2018-12-06 NOTE — ASSESSMENT & PLAN NOTE
· Patient had change in mentation at her facility and was transferred here  Per ED notes, patient was agitated and then found on the floor of her room  · CT head negative for acute intracranial abnormality  · Patient does have hx of dementia  Geriatrics consulted  Recommend to schedule tylenol, taper prednisone as this can contribute to confusion/agitation  Decrease from 10 mg to 5 mg today  Recommend to continue with depakote sprinkles and consult palliative care  · Etiology likely 2/2 UTI, YANNI, urinary rention - see related plans   · Monitor, after discussion with patient's family member Purvi Campos) it seems she likely is at her baseline  He reports she is not very interactive and is not recognizing family members anymore  He is in agreement that her quality of life has greatly diminished    Agreeable to speaking with palliative care  · Palliative care consult pending

## 2018-12-06 NOTE — PLAN OF CARE
Problem: Potential for Falls  Goal: Patient will remain free of falls  INTERVENTIONS:  - Assess patient frequently for physical needs  -  Identify cognitive and physical deficits and behaviors that affect risk of falls    -  [place] fall precautions as indicated by assessment   - Educate patient/family on patient safety including physical limitations  - Instruct patient to call for assistance with activity based on assessment  - Modify environment to reduce risk of injury  - Consider OT/PT consult to assist with strengthening/mobility   Outcome: Progressing      Problem: PAIN - ADULT  Goal: Verbalizes/displays adequate comfort level or baseline comfort level  Interventions:  - Encourage patient to monitor pain and request assistance  - Assess pain using appropriate pain scale  - Administer analgesics based on type and severity of pain and evaluate response  - Implement non-pharmacological measures as appropriate and evaluate response  - Consider cultural and social influences on pain and pain management  - Notify physician/advanced practitioner if interventions unsuccessful or patient reports new pain   Outcome: Progressing      Problem: INFECTION - ADULT  Goal: Absence or prevention of progression during hospitalization  INTERVENTIONS:  - Assess and monitor for signs and symptoms of infection  - Monitor lab/diagnostic results  - Monitor all insertion sites, i e  indwelling lines, tubes, and drains  - Monitor endotracheal (as able) and nasal secretions for changes in amount and color  - [place] appropriate cooling/warming therapies per order  - Administer medications as ordered  - Instruct and encourage patient and family to use good hand hygiene technique  - Identify and instruct in appropriate isolation precautions for identified infection/condition   Outcome: Progressing    Goal: Absence of fever/infection during neutropenic period  INTERVENTIONS:  - Monitor WBC     Outcome: Progressing      Problem: SAFETY ADULT  Goal: Maintain or return to baseline ADL function  INTERVENTIONS:  -  Assess patient's ability to carry out ADLs; assess patient's baseline for ADL function and identify physical deficits which impact ability to perform ADLs (bathing, care of mouth/teeth, toileting, grooming, dressing, etc )  - Assess/evaluate cause of self-care deficits   - Assess range of motion  - Assess patient's mobility; develop plan if impaired  - Assess patient's need for assistive devices and provide as appropriate  - Encourage maximum independence but intervene and supervise when necessary  ¯ Involve family in performance of ADLs  ¯ Assess for home care needs following discharge   ¯ Request OT consult to assist with ADL evaluation and planning for discharge  ¯ Provide patient education as appropriate   Outcome: Progressing    Goal: Maintain or return mobility status to optimal level  INTERVENTIONS:  - Assess patient's baseline mobility status (ambulation, transfers, stairs, etc )    - Identify cognitive and physical deficits and behaviors that affect mobility  - Identify mobility aids required to assist with transfers and/or ambulation (gait belt, sit-to-stand, lift, walker, cane, etc )  - [place] fall precautions as indicated by assessment  - Record patient progress and toleration of activity level on Mobility SBAR; progress patient to next Phase/Stage  - Instruct patient to call for assistance with activity based on assessment  - Request Rehabilitation consult to assist with strengthening/weightbearing, etc    Outcome: Progressing      Problem: DISCHARGE PLANNING  Goal: Discharge to home or other facility with appropriate resources  INTERVENTIONS:  - Identify barriers to discharge w/patient and caregiver  - Arrange for needed discharge resources and transportation as appropriate  - Identify discharge learning needs (meds, wound care, etc )  - Arrange for interpretive services to assist at discharge as needed  - Refer to Case Management Department for coordinating discharge planning if the patient needs post-hospital services based on physician/advanced practitioner order or complex needs related to functional status, cognitive ability, or social support system   Outcome: Progressing      Problem: Knowledge Deficit  Goal: Patient/family/caregiver demonstrates understanding of disease process, treatment plan, medications, and discharge instructions  Complete learning assessment and assess knowledge base  Interventions:  - Provide teaching at level of understanding  - Provide teaching via preferred learning methods   Outcome: Progressing      Problem: Nutrition/Hydration-ADULT  Goal: Nutrient/Hydration intake appropriate for improving, restoring or maintaining nutritional needs  Monitor and assess patient's nutrition/hydration status for malnutrition (ex- brittle hair, bruises, dry skin, pale skin and conjunctiva, muscle wasting, smooth red tongue, and disorientation)  Collaborate with interdisciplinary team and initiate plan and interventions as ordered  Monitor patient's weight and dietary intake as ordered or per policy  Utilize nutrition screening tool and intervene per policy  Determine patient's food preferences and provide high-protein, high-caloric foods as appropriate       INTERVENTIONS:  - Monitor oral intake, urinary output, labs, and treatment plans  - Assess nutrition and hydration status and recommend course of action  - Evaluate amount of meals eaten  - Assist patient with eating if necessary   - Allow adequate time for meals  - Recommend/ encourage appropriate diets, oral nutritional supplements, and vitamin/mineral supplements  - Order, calculate, and assess calorie counts as needed  - Recommend, monitor, and adjust tube feedings and TPN/PPN based on assessed needs  - Assess need for intravenous fluids  - Provide specific nutrition/hydration education as appropriate  - Include patient/family/caregiver in decisions related to nutrition    Outcome: Progressing      Problem: Prexisting or High Potential for Compromised Skin Integrity  Goal: Skin integrity is maintained or improved  INTERVENTIONS:  - Identify patients at risk for skin breakdown  - Assess and monitor skin integrity  - Assess and monitor nutrition and hydration status  - Monitor labs (i e  albumin)  - Assess for incontinence   - Turn and reposition patient  - Assist with mobility/ambulation  - Relieve pressure over bony prominences  - Avoid friction and shearing  - Provide appropriate hygiene as needed including keeping skin clean and dry  - Evaluate need for skin moisturizer/barrier cream  - Collaborate with interdisciplinary team (i e  Nutrition, Rehabilitation, etc )   - Patient/family teaching   Outcome: Progressing      Problem: DISCHARGE PLANNING - CARE MANAGEMENT  Goal: Discharge to post-acute care or home with appropriate resources  INTERVENTIONS:  - Conduct assessment to determine patient/family and health care team treatment goals, and need for post-acute services based on payer coverage, community resources, and patient preferences, and barriers to discharge  - Address psychosocial, clinical, and financial barriers to discharge as identified in assessment in conjunction with the patient/family and health care team  - Arrange appropriate level of post-acute services according to patient's   needs and preference and payer coverage in collaboration with the physician and health care team  - Communicate with and update the patient/family, physician, and health care team regarding progress on the discharge plan  - Arrange appropriate transportation to post-acute venues   Outcome: Progressing

## 2018-12-06 NOTE — CONSULTS
Consultation - Palliative and Supportive Care   Harvey Sheikh 80 y o  female [id number]    Assessment:  Advanced stage Alzheimer's dementia (Fast 7b at least) w/ behavioral disturbance  Biventricular heart failure  Pulmonary fibrosis  Hypertension  UTI      Plan:  1  Symptom management - she exhibits no symptoms as yet  But below are recommendations to start with prn:    DYSPNEA/PAIN/DISCOMFORT   - tylenol 975mg q8H ATC to address delirium/agitation from discomfort which is likely multifactorial at this point  Start now  - oxycodone 5mg/5ml oral solution, 0 5mg (0 5ml) q2H prn for dyspnea/pain/discomfort    - ativan intensol 2mg/ml oral concentrate, 0 5mg (0 25ml) q2H prn for anxiety/SOB     ANXIETY/AGITATION   - ativan intensol 2mg/ml oral concentrate, 0 5mg (0 25ml) q2H prn for anxiety/agitation    CONSTIPATION   - miralax daily, senna 1 tab daily prn, dulcolax 10mg rectal supp daily prn to start with    DEMENTIA w/ behavioral disturbance   -agree with our geriatric colleagues to continue home depakote dose   -continue vitamin D supplementation as long as able to take pills without any issues, otherwise, would recommend discontinuing   -would discontinue MV    PULMONARY FIBROSIS   -continue prednisone 5mg daily    *Propranolol - unclear why she's on this  Continue for now, low threshold to d/c if BP remains low    2  Goals    - Held a family meeting with myself, Mira Maki (LSW), Andreea Venegas (brother and POA), and Aram's wife in a conference room  We acknowledge the fact that Royal augustine is not competent on our exam today to participate in any medical decisions  We acknowledge that Royal augustine has advanced stage dementia that worsened in the last year  She has been hospitalized a few times this year with infections and falls  She has never bounced back to how she was before being hospitalized and her health and mental status continues to worsen after each hospital stay   They tell us that prior to her dementia being diagnosed 8-9 years ago, Tigre Renner was a strong-willed and independent woman and would not want this for herself  They want her comfortable and they want her to live the rest of her days with dignity to uphold wishes she expressed for herself years prior  They have provided her with support and care since her disease set it  Unfortunately, we are likely at that turning point where she likely may not be with us in 6 months  They have accepted hospice and its comfort-directed approach  They would like her to go back to [facility] to accept hospice services  We explained our treatment plan moving forward should she start developing symptoms  She is now a level 3 - DNR/DNI  Code Status: DNR/DNI - Level 3   Power of :  presumed to be brother by [place] Act 169   Advance Directive / Living Will: none on file   POLST:  None on file         We appreciate the invitation to be involved in this patient's care  We will continue to follow while in the hospital   Please do not hesitate to reach our on call provider through our clinic answering service at  should you have acute symptom control concerns  IDENTIFICATION:       Inpatient consult to Palliative Care     Performed by  Minnie Lopez     Authorized by Shefali Delong            Physician Requesting Consult: Jillian Clark MD  Reason for Consult / Principal Problem: goals of care  Hx and PE limited by: dementia    HISTORY OF PRESENT ILLNESS:       Chano Aldrich is a 80 y o  female with PMHx significant for Alzheimer's dementia, Biventricular heart failure and pulmonary fibrosis who was admitted at [facility] on 12/4/2018 after being found unresponsive at her facility  On further work up, she was found to have delirium thought to be multifactorial secondary to UTI and urinary retention  She has had several hospitalizations in the past year  Her family is noticing progressive decline  We are consulted for goals of care  We saw Mrs Medel this afternoon in her room  She was lying in bed, pleasantly confused  She otherwise looked comfortable  Review of Systems   Unable to perform ROS: dementia       Past Medical History:   Diagnosis Date    Alzheimer disease     CHF [facility]congestive heart failure) ([facility] [address] )     Hypertension      History reviewed  No pertinent surgical history  Social History     Social History    Marital status: Single     Spouse name: N/A    Number of children: N/A    Years of education: N/A     Occupational History    Not on file  Social History Main Topics    Smoking status: Former Smoker     Packs/day: 1 00    Smokeless tobacco: Never Used    Alcohol use No    Drug use: No    Sexual activity: Not on file     Other Topics Concern    Not on file     Social History Narrative    No narrative on file     History reviewed  No pertinent family history  MEDICATIONS / ALLERGIES:    all current active meds have been reviewed    Allergies   Allergen Reactions    Sertraline Other (See Comments)     zoloft       OBJECTIVE:    Physical Exam  Physical Exam   Constitutional:   Thin, frail, pleasantly demented, NAD   HENT:   Head: Normocephalic and atraumatic  Eyes: Pupils are equal, round, and reactive to light  EOM are normal  Right eye exhibits no discharge  Left eye exhibits no discharge  Cardiovascular: Normal rate  Pulmonary/Chest: Effort normal  No respiratory distress  Abdominal: Soft  She exhibits no distension  Musculoskeletal: She exhibits no edema  Skin: Skin is warm and dry  Psychiatric:   Unable to assess       Lab Results: I have personally reviewed pertinent labs  Imaging Studies: I have personally reviewed pertinent reports  EKG, Pathology, and Other Studies: I have personally reviewed pertinent reports  Counseling / Coordination of Care  Total floor / unit time spent today 60 minutes  Greater than 50% of total time was spent with the patient and / or family counseling and / or coordination of care   A description of the counseling / coordination of care: family meeting, goals of care, symptom management, pathophysiology of disease

## 2018-12-06 NOTE — ASSESSMENT & PLAN NOTE
· Leukocytosis, urine culture with >100,000 e  coli  Previously had UTI/asx bacteriuria with ESBL  · ID consulted, appreciate input  Patient is not able to provide history 2/2 dementia  Currently on IV Invanz  · Concern that Orrstephany Felix would affect Depakote levels  D/w RN at [facility], patient is on Depakote sprinkles not for history of seizures but for mood stabilizer  · Procalcitonin elevated at 23 81-->25 97-->12 79  · In setting of urinary retention s/p briones decompression  · Follow-up final urine culture and sensitivities    BC x 2 negative @ 24 hrs  · Monitor temps, WBC, clinical course  · Possibly contributing to encephalopathy

## 2018-12-06 NOTE — SOCIAL WORK
Per the request of Dr Salma Xiong joined her, brother Nasrin Izquierdo, and sister-in-law Osmani to clarify goals of care  Both Nasrin Izquierdo and Osmani feel that patient would not want continued aggressive treatment like rehabilitation  They both identify that the number one goal would be to have Roma Oliver comfortable and not in the hospital or rehabilitation  They feel that hospice care within [facility] is the best next step  Family is advised that they will need to discuss the hospice decision further with CM and that consents will need to be completed  LSW informed CM that the family is requesting hospice within [facility]  CM will discuss with family hospice providers and the location of hospice

## 2018-12-06 NOTE — PROGRESS NOTES
Palliative Care consulted, and now following patient  We will sign off and defer formal follow-up at this time

## 2018-12-06 NOTE — ASSESSMENT & PLAN NOTE
· POA, Cr 1 32 now at 1 07 following IVF  · Likely 2/2 dehydration, urinary retention s/p briones placement   · Monitor BMP   · Resolved

## 2018-12-06 NOTE — PROGRESS NOTES
Progress Note - Infectious Disease   Hayes Cervantes 80 y o  female MRN: [id number]  Unit/Bed#: [facility] 901-01 Encounter: [id number]      Impression/Plan:  1  Leukocytosis-probable secondary to urinary tract infection   Possibly secondary to another infectious or noninfectious etiology   Fortunately the patient remains hemodynamically stable and nontoxic despite the leukocytosis   She has been afebrile and does not have any current symptomatology  Procalcitonin level has decreased   -antibiotics as below  -follow-up blood cultures and urine cultures  -recheck procalcitonin level tomorrow a m   -additional workup as needed     2  Probable urinary tract infection-the procalcitonin level is quite elevated and the leukocytosis persists  Urine culture is growing an E coli    Patient denies having any urinary symptoms but she has a Vargas catheter in place now  Buzz [place] has also been afebrile and hemodynamically stable   She has been previously at least colonized or infected with an ESBL E coli  Patient on valproic acid for mood stabilization and not seizure so safe toe AH chew despite interaction with ertapenem  -continue ertapenem for now at current dose  -follow up sensitivities and adjust antibiotics as needed  -will need to watch the patient clinically as the ertapenem will decrease the level of the valproic acid  -plan 7 days of antibiotics     3   Acute urinary retention-possibly complicated by UTI   Unclear etiology   The patient has had a Vargas catheter placed  Renal ultrasound without hydronephrosis  -Vargas drainage  -urology evaluation pending  -no additional ID workup for now     4  Elevated serum creatinine-likely multifactorial including obstruction, and possible pre renal issues  Renal function has improved  -Vargas drainage  -volume management  -recheck BMP tomorrow     5  Acute encephalopathy-in the setting of baseline Alzheimer dementia   Suspect metabolic issues, and urinary retention playing a role   Possible infection as above   Patient's cognition has apparently  improved   Unclear baseline   -monitor cognition  -treat metabolic issues  -follow-up urine culture and blood culture     Discussed in detail with Urology in the primary service    Antibiotics:  Ertapenem 2  Antibiotics 3    Subjective:  Patient has no fever, chills, sweats; no nausea, vomiting, diarrhea; no cough, shortness of breath; no pain  No new symptoms  Objective:  Vitals:  Temp:  [97 6 °F (36 4 °C)-98 8 °F (37 1 °C)] 97 7 °F (36 5 °C)  HR:  [67-81] 80  Resp:  [18] 18  BP: (140-162)/(60-73) 162/70  SpO2:  [97 %-100 %] 97 %  Temp (24hrs), Av °F (36 7 °C), Min:97 6 °F (36 4 °C), Max:98 8 °F (37 1 °C)  Current: Temperature: 97 7 °F (36 5 °C)    Physical Exam:   General Appearance:  Alert, answers very simple yes no questions, debilitated   Throat: Oropharynx moist without lesions  Lungs:   Clear to auscultation bilaterally; no wheezes, rhonchi or rales; respirations unlabored   Heart:  RRR; no murmur, rub or gallop   Abdomen:   Soft, non-tender, non-distended, positive bowel sounds  Extremities: No clubbing, cyanosis or edema   Skin: No new rashes or lesions  No draining wounds noted         Labs, Imaging, & Other studies:   All pertinent labs and imaging studies were personally reviewed    Results from last 7 days  Lab Units 18  0518  0438 18  1326 18  0654   WBC Thousand/uL 11 94* 19 17*  --  23 12*   HEMOGLOBIN g/dL 11 0* 10 4*  --  11 1*   PLATELETS Thousands/uL 235 285 275 277       Results from last 7 days  Lab Units 18  0526 18  0438 18  0654   SODIUM mmol/L 140 138 136   POTASSIUM mmol/L 3 8 3 6 4 3   CHLORIDE mmol/L 109* 106 104   CO2 mmol/L 24 24 27   BUN mg/dL 38* 47* 53*   CREATININE mg/dL 1 07 1 09 1 32*   EGFR ml/min/1 73sq m 47 46 37   CALCIUM mg/dL 9 4 9 2 9 4   AST U/L  --  21  --    ALT U/L  --  17  --    ALK PHOS U/L  --  64  --        Results from last 7 days  Lab Units 12/04/18  1505 12/04/18  1352 12/04/18  0657   BLOOD CULTURE  No Growth at 24 hrs   No Growth at 24 hrs   --    URINE CULTURE   --   --  >100,000 cfu/ml Escherichia coli*     Renal ultrasound-no hydronephrosis    Images reviewed by me in PACS

## 2018-12-06 NOTE — OCCUPATIONAL THERAPY NOTE
[address] Evaluation     Patient Name: Naseem Nelson  FKSPO'L Date: 12/6/2018  Problem List  Patient Active Problem List   Diagnosis    Essential hypertension    Dementia without behavioral disturbance    Pneumonia    Multiple fractures    Ambulatory dysfunction    Acute bronchopneumonia    AAA (abdominal aortic aneurysm) (HCC)    Chronic combined systolic and diastolic heart failure (HCC)    Osteoporosis    Pulmonary fibrosis (HCC)    [facility]    Tobacco use disorder    Bilateral edema of lower extremity    Heart failure (HCC)    UTI (urinary tract infection)    Toxic metabolic encephalopathy    [facility]ANNI [facility]) ([facility] [address] )    Urinary retention    Debility    Fall     Past Medical History  Past Medical History:   Diagnosis Date    Alzheimer disease     CHF [facility]congestive heart failure) ([facility] [address] [facility]     Hypertension      Past Surgical History  History reviewed  No pertinent surgical history  12/06/18 0920   Note Type   Note type Eval/Treat   Restrictions/Precautions   Weight Bearing Precautions Per Order No   Other Precautions Cognitive; Chair Alarm; Bed Alarm; Fall Risk   Pain Assessment   Pain Assessment FLACC   Pain Rating: FLACC (Rest) - Face 0   Pain Rating: FLACC (Rest) - Legs 0   Pain Rating: FLACC (Rest) - Activity 0   Pain Rating: FLACC (Rest) - Cry 0   Pain Rating: FLACC (Rest) - Consolability 0   Score: FLACC (Rest) 0   Pain Rating: FLACC (Activity) - Face 1   Pain Rating: FLACC (Activity) - Legs 1   Pain Rating: FLACC (Activity) - Activity 1   Pain Rating: FLACC (Activity) - Cry 1   Pain Rating: FLACC (Activity) - Consolability 1   Score: FLACC (Activity) 5   Home Living   Type of Home Assisted living  [facility])   Prior Function   Level of [place] Independent with ADLs and functional mobility   Lives With Facility staff   Receives Help From Personal care attendant   ADL Assistance Needs assistance   IADLs Needs assistance Vocational Retired   Lifestyle   Autonomy received assist with all aspects of adls, transfers to w/c - 1* w/c bound - was able to propel   Reciprocal Relationships supportive family   Service to Others retired   Intrinsic Gratification unable to id   Subjective   Subjective offers no c/o   ADL   Eating Assistance 2  Maximal Assistance   Grooming Assistance 2  Maximal Assistance   UB Bathing Assistance 2  Maximal Assistance   LB [address] 1  Total Assistance   UB Dressing Assistance 2  Maximal Assistance   LB Dressing Assistance 1  Total Assistance   Toileting Assistance  1  Total Assistance   Bed Mobility   Rolling R 2  Maximal assistance   Rolling L 2  Maximal assistance   Supine to Sit 2  Maximal assistance   Additional items Assist x 2   Sit to Supine 2  Maximal assistance   Additional items Assist x 2   Transfers   Sit to Stand Unable to assess   Balance   Static Sitting Poor   Dynamic Sitting Poor   Static Standing Zero   Activity Tolerance   Activity Tolerance Patient limited by fatigue;Treatment limited secondary to medical complications (Comment)   RUE Assessment   RUE Assessment WFL   LUE Assessment   LUE Assessment WFL   Cognition   Overall Cognitive Status Impaired   Arousal/Participation Arousable;Persistent stimuli required   Attention Difficulty attending to directions   Orientation Level Oriented to person   Memory Decreased short term memory;Decreased recall of recent events;Decreased recall of precautions   Following Commands Follows one step commands inconsistently   Assessment   Limitation Decreased ADL status; Decreased Safe judgement during ADL;Decreased endurance;Decreased cognition;Decreased self-care trans   Prognosis Fair   Assessment Pt is a 80 y o  female who was [facility] with Toxic metabolic encephalopathy   Pt's problem list also includes PMH of HTN, dementia and chf   At baseline pt was completing adls with assist from facility staff - was able to transfer to w/c and self propel  Pt lives at [address]  Currently pt requires max assist for overall ADLS and mod to max a x 2  for functional mobility/transfers  Pt currently presents with impairments in the following categories -difficulty performing ADLS, limited insight into deficits, flat affect, decreased initiation and engagement  and health management  activity tolerance, endurance, standing balance/tolerance, sitting balance/tolerance, arousal, memory, insight, safety , judgement , attention  and sequencing   These impairments, as well as pt's fatigue, pain and risk for falls  limit pt's ability to safely engage in all baseline areas of occupation, includingeating, grooming, bathing, dressing, toileting, functional mobility/transfers, social participation  and leisure activities  From OT standpoint, recommendations will b e dependent upon families decision for ongoing intervention (palliative care consult pending)  OT will continue to follow to address the below stated goals  Goals   Patient Goals none stated 2* cognitive status   LTG Time Frame 10-14   Long Term Goal #1 refer to established goals below   Plan   Treatment Interventions ADL retraining;Functional transfer training;UE strengthening/ROM; Endurance training;Cognitive reorientation;Patient/family training;Equipment evaluation/education; Compensatory technique education; Activityengagement   Goal Expiration Date 12/20/18   OT Frequency 2-3x/wk   Recommendation   OT Discharge Recommendation Short Term Rehab  (vs return to facility with increased support )   Barthel Index   Feeding 0   Bathing 0   Grooming Score 0   Dressing Score 0   Bladder Score 0   Bowels Score 0   Toilet Use Score 5   Transfers (Bed/Chair) Score 5   Mobility (Level Surface) Score 0   Stairs Score 0   Barthel Index Score 10       OCCUPATIONAL THERAPY GOALS:    *INCREASE AROUSAL TO 15-20 MIN /SESSION  *FOLLOW 1 STEP COMMANDS WITH 75% CONSISTENCY  *SBA SELF FEEDING AND GROOMING AFTER SETUP  *MIN A UB ADLS AFTER SETUP   *MOD A TOILETING AND CLOTHING MANAGMENET  *MOD A BED MOBILITY WITH FAIR TO FAIR+ SITTING BALANCE/TOLERANCE TO ENGAGE IN LIGHT ADLS AND ENJOYABLE TASKS  *INCREASE ACTIVITY TOLERANCE TO 30-35 FOR PARTICIPATION IN ADLS AND ENJOYABLE ACTIVITIES   *ASSESS DME NEEDS  *ONGOING FUNCTIONAL COGNITIVE ASSESSMENT TO ASSIST WITH SAFE D/C RECOMMENDATIONS

## 2018-12-06 NOTE — PROGRESS NOTES
Progress Note - Celsa Obando 7/7/1932, 80 y o  female MRN: [id number]    Unit/Bed#: [facility] 901-01 Encounter: [id number]    Primary Care Provider: Bro Parra MD   Date and time admitted to hospital: 12/4/2018  5:25 AM      * Toxic metabolic encephalopathy   Assessment & Plan    · Patient had change in mentation at her facility and was transferred here  Per ED notes, patient was agitated and then found on the floor of her room  · CT head negative for acute intracranial abnormality  · Patient does have hx of dementia  Geriatrics consulted  Recommend to schedule tylenol, taper prednisone as this can contribute to confusion/agitation  Decrease from 10 mg to 5 mg today  Recommend to continue with depakote sprinkles and consult palliative care  · Etiology likely 2/2 UTI, YANNI, urinary rention - see related plans   · Monitor, after discussion with patient's family member Annalee Escobedo) it seems she likely is at her baseline  He reports she is not very interactive and is not recognizing family members anymore  He is in agreement that her quality of life has greatly diminished  Agreeable to speaking with palliative care  · Palliative care consult pending     UTI (urinary tract infection)   Assessment & Plan    · Leukocytosis, urine culture with >100,000 e  coli  Previously had UTI/asx bacteriuria with ESBL  · ID consulted, appreciate input  Patient is not able to provide history 2/2 dementia  Currently on IV Invanz  · Concern that Mary Jefry would affect Depakote levels  D/w RN at [facility], patient is on Depakote sprinkles not for history of seizures but for mood stabilizer  · Procalcitonin elevated at 23 81-->25 97-->12 79  · In setting of urinary retention s/p briones decompression  · Follow-up final urine culture and sensitivities    BC x 2 negative @ 24 hrs  · Monitor temps, WBC, clinical course  · Possibly contributing to encephalopathy     Urinary retention   Assessment & Plan    · Urinary retention, POA, ?complicated by UTI  · S/p briones decompression  · Urology consulted, appreciate input  US kidney/bladder shows echogenic, mildly atrophic kidneys, no hydronephrosis  · Urology recommends voiding trial when mental status improved and more mobile     YANNI (acute kidney injury) ([facility] [address] )   Assessment & Plan    · POA, Cr 1 32 now at 1 07 following IVF  · Likely 2/2 dehydration, urinary retention s/p briones placement   · Monitor BMP   · Resolved     Pulmonary fibrosis ([facility] [address] )   Assessment & Plan    · Recently treated for pneumonia, bronchitis, possible reason for steroid taper  · Would continue with steroid taper - decrease from 10 mg to 5 mg today as this can contribute to agitation/delirium/confusion, continue to taper  · Consider prednisone taper to be contributing to leukocytosis  · Continue to give supportive care and oxygen as needed     Chronic combined systolic and diastolic heart failure (HCC)   Assessment & Plan    · BNP is elevated, Troponin at 0 05-->0 04  · Appears euvolemic on exam, CXR negative for acute cardiopulmonary disease  · Cardiology stopped all diuretics in past   · Echo from 2017 shows EF 55%, no regional wall motion abnormalities  · Monitor     Ambulatory dysfunction   Assessment & Plan    · PT/OT evaluations     Dementia without behavioral disturbance   Assessment & Plan    · Patient has alzheimer's dementia with likely progression  · Follows with Dr Sebastian Heck with geriatrics  · Geriatrics consulted, appreciate input  Recommend to continue with current therapies, schedule tylenol, and consult palliative care     Essential hypertension   Assessment & Plan    · Continue with propranolol 20 mg q12hr       VTE Pharmacologic Prophylaxis:   Pharmacologic: Enoxaparin (Lovenox)  Mechanical VTE Prophylaxis in Place: No    Patient Centered Rounds: I have performed bedside rounds with nursing staff today      Discussions with Specialists or Other Care Team Provider: RN    Education and Discussions with Family / Patient: patient    Time Spent for Care: 20 minutes  More than 50% of total time spent on counseling and coordination of care as described above  Current Length of Stay: 0 day(s)    Current Patient Status: Observation   Certification Statement: continue obs for now, will need to require additional stay due to requiring IV abx, palliative care discussion    Discharge Plan: not ready yet    Code Status: Level 1 - Full Code      Subjective:   History limited 2/2 dementia  She denies pain  States she is cold and that she ate her breakfast     Objective:     Vitals:   Temp (24hrs), Av °F (36 7 °C), Min:97 6 °F (36 4 °C), Max:98 8 °F (37 1 °C)    Temp:  [97 6 °F (36 4 °C)-98 8 °F (37 1 °C)] 97 7 °F (36 5 °C)  HR:  [67-81] 80  Resp:  [18] 18  BP: (140-162)/(60-73) 162/70  SpO2:  [97 %-100 %] 97 %  Body mass index is 18 61 kg/m²  Input and Output Summary (last 24 hours): Intake/Output Summary (Last 24 hours) at 18 0856  Last data filed at 18 6962   Gross per 24 hour   Intake              522 ml   Output              750 ml   Net             -228 ml       Physical Exam:     Physical Exam   Constitutional:   Chronically ill appearing   HENT:   Head: Normocephalic and atraumatic  Mouth/Throat: Oropharynx is clear and moist    Eyes: Pupils are equal, round, and reactive to light  EOM are normal  No scleral icterus  Cardiovascular: Normal rate, regular rhythm and normal heart sounds  No murmur heard  Pulmonary/Chest: No respiratory distress  She has no wheezes  She has no rales  She exhibits no tenderness  Poor effort   Abdominal: Soft  Bowel sounds are normal  She exhibits no distension  There is no tenderness  Musculoskeletal: Normal range of motion  She exhibits no edema  Neurological: She is alert  Oriented to self  Confused   Skin: Skin is warm and dry  She is not diaphoretic  Multiple areas of ecchymosis noted to extremities   Psychiatric: She is inattentive     Vitals reviewed  Additional Data:     Labs:      Results from last 7 days  Lab Units 12/06/18  0526  12/04/18  0654   WBC Thousand/uL 11 94*  < > 23 12*   HEMOGLOBIN g/dL 11 0*  < > 11 1*   HEMATOCRIT % 37 5  < > 35 5   PLATELETS Thousands/uL 235  < > 277   BANDS PCT %  --   --  9*   NEUTROS PCT % 80*  < >  --    LYMPHS PCT % 12*  < >  --    LYMPHO PCT %  --   --  2*   MONOS PCT % 7  < >  --    MONO PCT %  --   --  3*   EOS PCT % 1  < > 0   < > = values in this interval not displayed  Results from last 7 days  Lab Units 12/06/18  0526 12/05/18  0438   SODIUM mmol/L 140 138   POTASSIUM mmol/L 3 8 3 6   CHLORIDE mmol/L 109* 106   CO2 mmol/L 24 24   BUN mg/dL 38* 47*   CREATININE mg/dL 1 07 1 09   ANION GAP mmol/L 7 8   CALCIUM mg/dL 9 4 9 2   ALBUMIN g/dL  --  2 5*   TOTAL BILIRUBIN mg/dL  --  0 40   ALK PHOS U/L  --  64   ALT U/L  --  17   AST U/L  --  21   GLUCOSE RANDOM mg/dL 90 88                   Results from last 7 days  Lab Units 12/06/18  0527 12/05/18  0438 12/04/18  1352   PROCALCITONIN ng/ml 12 79* 25 97* 23 81*           * I Have Reviewed All Lab Data Listed Above  * Additional Pertinent Lab Tests Reviewed: All Labs Within Last 24 Hours Reviewed    Imaging:    Imaging Reports Reviewed Today Include: US kidney/bladder  Imaging Personally Reviewed by Myself Includes:  US kidney/bladder    Recent Cultures (last 7 days):       Results from last 7 days  Lab Units 12/04/18  1505 12/04/18  1352 12/04/18  0657   BLOOD CULTURE  No Growth at 24 hrs   No Growth at 24 hrs   --    URINE CULTURE   --   --  >100,000 cfu/ml Escherichia coli*       Last 24 Hours Medication List:     Current Facility-Administered Medications:  acetaminophen 650 mg Oral Q8H [address] Glory Reid PA-C    aluminum-magnesium hydroxide-simethicone 30 mL Oral Q6H PRN Brad Maki MD    divalproex sodium 125 mg Oral HS Brad Maki MD    docusate sodium 100 mg Oral BID Brad Maki MD    enoxaparin 30 mg Subcutaneous Daily Justino SHEPARD Radhames Villanueva MD    [START ON 12/31/2018] ergocalciferol 50,000 Units Oral Q28 Days Harsha Valdez MD    ertapenem 1,000 mg Intravenous Q24H Aurelia Keen MD Last Rate: 1,000 mg (12/05/18 1055)   influenza vaccine 0 5 mL Intramuscular Prior to discharge Jessica Jovelr,     multivitamin-minerals 1 tablet Oral Daily Harsha Valdez MD    ondansetron 4 mg Intravenous Q6H PRN Harsha Valdez MD    predniSONE 5 mg Oral Daily Lyndsey Wasserman PA-C    propranolol 20 mg Oral Q12H [facility] Radhames Villanueva MD    senna 1 tablet Oral Daily Harsha Valdez MD         Today, Patient Was Seen By: Elizabeth Tate PA-C    ** Please Note: Dictation voice to text software may have been used in the creation of this document   **

## 2018-12-06 NOTE — UTILIZATION REVIEW
Initial Clinical Review    Admission: Date/Time/Statement: Observation 18 and changed to Inpatient  @ 1457  Pt requiring further stay for Iv Antibiotics for Complicated UTI    Admitting Physician MYLA Koenig    Level of Care Med Surg    Estimated length of stay More than 2 Midnights    Certification I certify that inpatient services are medically necessary for this patient for a duration of greater than two midnights  See H&P and MD Progress Notes for additional information about the patient's course of treatment  ED Arrival Information     Expected Arrival Acuity Means of Arrival Escorted By Service Admission Type    2018 05:25 2018 05:25 Urgent Ambulance [facility] of [address] Urgent    Arrival Complaint    Fall          Chief Complaint   Patient presents with    Fall     Pt was found on floor next to her bed as reported by staff at [facility]  Pt has no complaints at this time  History of Illness:   80year old female with alzheimer dementia  presents to ed from nursing facility for unresponsiveness  Recently evaluatedf or bronchitis and pneumonia  She has pulmonary fibrosis  And is on prednisone  In the ed she is lethargic          ED Triage Vitals [18 0537]   98 2 °F (36 8 °C) 87 18 155/81 97 %      No Pain       18 54 4 kg (120 lb)     Abnormal Labs/Diagnostic Test Results:  jvd present -  blle edema  Present     EK bmp, no st-t wave changes, irregularly irregular, PVCs, PACs    Troponin  05   04  BNP   9,919   Bun 53  Creatinine  1 32   Wbc  23 12     procalcitonin  23 81       Urinalysis   Moderate blood, positive nitrite, large leukocytes, wbc 30-50 , innumerable bacteria   Urine Culture >100,000 cfu/ml Gram Negative Pascual Enteric Like (A)         ED Treatment:   Medication Administration from 2018 0525 to 2018 1627       Date/Time Order Dose Route     2018 0717 ceftriaxone (ROCEPHIN) 1 g/50 mL in dextrose IVPB 1,000 mg Intravenous     12/04/2018 1453 multivitamin-minerals (CENTRUM) tablet 1 tablet 1 tablet Oral     12/04/2018 1454 predniSONE tablet 10 mg 10 mg Oral     12/04/2018 1453 propranolol (INDERAL) tablet 20 mg 20 mg Oral     12/04/2018 1454 docusate sodium (COLACE) capsule 100 mg 100 mg Oral     12/04/2018 1454 senna (SENOKOT) tablet 8 6 mg 8 6 mg Oral     12/04/2018 1453 enoxaparin (LOVENOX) subcutaneous injection 30 mg 30 mg Subcutaneous       Past Medical/Surgical History:     Diagnosis    Essential hypertension    Dementia without behavioral disturbance    Pneumonia    Multiple fractures    Ambulatory dysfunction    Acute bronchopneumonia    [facility]    Chronic combined systolic and diastolic heart failure ([facility]    Osteoporosis    Pulmonary fibrosis ([facility]    PMR (polymyalgia rheumatica) ([facility]    Tobacco use disorder    Bilateral edema of lower extremity    Heart failure ([facility])     Admitting Diagnosis:   rinary retention [R33 9]  Urinary tract infection [N39 0]  Pain [R52]  YANNI (acute kidney injury) ([facility] [address] ) [N17 9]  Ambulatory dysfunction [R26 2]  Late onset Alzheimer's disease without behavioral disturbance [G30 1, F02 80]    Age/Sex: 80 y o  female    Assessment/Plan:   Consult infectious disease  For  Urinary tract infection and possibly  A secondary infection  Start iv ertapenum  and follow blood and urine cultures  Check procalcitonin  Level   Vargas catheter placed   For urinary retention and acute kidney injury   Change in mental status likely related to metabolic issues or infection      Pulmonary fibrosis with recent treatment of pneumonia   Continue steroid taper    Acute on chronic CHF  With positive troponin  And BNP  9,919 :   Telemetry, trend troponin, start diuresis, consult cardiology if not improvement, chest x ray, bmp       Admission Orders:  Bld culture x2  Consult infectious disease  Consult gerontology  Consult wound culture   Telemetry   Consult urology   PT and OT eval and treat   US kidney  And bladder       Scheduled Meds:   divalproex sodium 125 mg Oral HS   docusate sodium 100 mg Oral BID   enoxaparin 30 mg Subcutaneous Daily   [START ON 12/31/2018] ergocalciferol 50,000 Units Oral Q28 Days   ertapenem 1,000 mg Intravenous Q24H   multivitamin-minerals 1 tablet Oral Daily   predniSONE 10 mg Oral Daily   propranolol 20 mg Oral Q12H [address]   senna 1 tablet Oral Daily     -------------------------------------------------------------------------------------------------------------------  12/5 Progress notes:  Toxic Metabolic Encephalopathy/ UTI/ Urinary retention/ YANNI  Leukocytosis, urine culture with >100,000 gram negative martin  Previously had UTI/asx bacteriuria with ESBL  Infectious Disease following  Monitor temps, WBC, clinical course, possible contributing to encephalopathy  Urinary retention - Complicated by UTI, S/P Vargas decompression, Urology following  F/u US Bladder/kidney  Creat = 1 32  IVF  Monitor B/P    ------------------------------------------------------------------------------------------------------------    12/5 Urology cons:  Fever/ Encephalopathy/ Alzheimer's dementia    12/5 F/u Renal US -  Echogenic, mildly atrophic kidneys, indicative of medical renal disease  No hydronephrosis     Trial of voiding once mental status improved and is able to be out of bed    ---------------------------------------------------------------------------------------------------------  Scheduled Meds: 12/6  acetaminophen 650 mg Oral Q8H [address]   divalproex sodium 125 mg Oral HS   docusate sodium 100 mg Oral BID   enoxaparin 30 mg Subcutaneous Daily   [START ON 12/31/2018] ergocalciferol 50,000 Units Oral Q28 Days   ertapenem 1,000 mg Intravenous Q24H   multivitamin-minerals 1 tablet Oral Daily   predniSONE 10 mg Oral Daily   propranolol 20 mg Oral Q12H HELENE   senna 1 tablet Oral Daily     Continuous Infusions: None    PRN Meds:  aluminum-magnesium hydroxide-simethicone    ondansetron    ---------------------------------------------------------------------------------------------------    12/6 Progress notes:  Toxic metabolic encephalopathy/ Etiology likely 2/2 UTI, YANNI, urinary rention/ Dementia  Per family member she is not very interactive and is not recognizing family members anymore  He is in agreement that her quality of life has greatly diminished  Agreeable to speaking with palliative care  Palliative care consult pending  Currently on IV Invanz  Concern that Pj Wheeler would affect Depakote levels   On Depakote for mood stabilizer  Patient has alzheimer's dementia with likely progression    12/6 Per Infectious Disease:  Continue ertapenem for now at current dose  Follow up sensitivities and adjust antibiotics as needed  Will need to watch the patient clinically as the ertapenem will decrease the level of the valproic acid  Plan 7 days of antibiotics

## 2018-12-06 NOTE — ASSESSMENT & PLAN NOTE
· Patient has alzheimer's dementia with likely progression  · Follows with Dr Zaina Chapman with geriatrics  · Geriatrics consulted, appreciate input    Recommend to continue with current therapies, schedule tylenol, and consult palliative care

## 2018-12-07 NOTE — ASSESSMENT & PLAN NOTE
· Urinary retention, POA, ?complicated by UTI  · S/p briones decompression  · Urology consulted, appreciate input    US kidney/bladder shows echogenic, mildly atrophic kidneys, no hydronephrosis  · Urology recommends voiding trial when mental status improved and more mobile  · Will d/c with briones in place, voiding trial at living facility

## 2018-12-07 NOTE — SOCIAL WORK
[employer] formed by Reina Raines that patient is approved and that they would be able to supply equipment starting tomorrow  Cm contacted patient's insurance authorization for nonemergency ground bls transport  Cm spoke with Kylee Han,  who will review requests for bls  Cm advise by Kylee Han that patient may not qualify for bls transport and that CM to inform family about need for out-of-pocket costs for ambulatee  Cm also advised by Ms Al Craig that medical director would contact weekend cm tomorrow morning with a final determination on transportation auth  Cm contacted the patient's brother to inform them about transportation costs and concerns  Patient's brother in agreement with and ambulatee transport and cost   Cm informed patients brother that transport time is still pending and that we can cm or follow up with discharge time

## 2018-12-07 NOTE — PROGRESS NOTES
Rounds completed with SLIM, will continue iv abx until culture results are finalized, and anticipate PO abx with culture results  Ongoing goals of care discussions with family

## 2018-12-07 NOTE — SOCIAL WORK
Cm reviewed patient during care coordination rounds  Cm confirm that the patient's family is requesting patient be discharged on hospice care back to [facility]  Cm contacted [facility], and confirmed that the patient can return under hospice care through [address] [place] [address]  Cm confirmed that [facility] does not have a comfort care plus unit, and that patient is receiving the highest level of care/support  Cm confirmed with Shanel Em that patient would need to have an updated medical evaluation form completed, all hospice equipment delivered to the patient's room at [facility], and PRN scripts filled out and sent with patient prior to return to [facility]    CM to communicate to hospice lease on and medical team

## 2018-12-07 NOTE — PROGRESS NOTES
Progress Note - Letty Hunter 7/7/1932, 80 y o  female MRN: [id number]    Unit/Bed#: [facility] 901-01 Encounter: [id number]    Primary Care Provider: Radha Robles MD   Date and time admitted to hospital: 12/4/2018  5:25 AM    Addendum: Spoke with patient's brother, Indio Anderson, over the phone to clarify hospice goals  Initially, goal was to treat reversible UTI and discharge back to her assisted living and would pursue hospice care after discharge at the facility  D/w CM, would have to pursue SNF due to functional decline for PT/OT services if not discharging with hospice  Discussed with Indio Anderson, he does not wish to pursue rehab for Mrs Medel and he just wants to focus on her comfort only, and given this information he would wish to discharge on/with hospice as he does not wish for rehab  Plan will be to pursue hospice cares prior to discharge  * Toxic metabolic encephalopathy   Assessment & Plan    · Patient had change in mentation at her facility and was transferred here  Per ED notes, patient was agitated and then found on the floor of her room  · CT head negative for acute intracranial abnormality  · Patient does have hx of dementia  Geriatrics consulted  Recommend to schedule tylenol, taper prednisone as this can contribute to confusion/agitation  Decrease from 10 mg to 5 mg today  Recommend to continue with depakote sprinkles and consult palliative care  · Etiology likely 2/2 UTI, YANNI, urinary rention - see related plans   · Monitor, after discussion with patient's family member Juan J Acosta) it seems she likely is at her baseline  He reports she is not very interactive and is not recognizing family members anymore  He is in agreement that her quality of life has greatly diminished  Agreeable to speaking with palliative care  · Palliative care consulted    After discussion with family, they would want to treat reversible causes presently (i e  UTI) but would then be interested in pursuing hospice services from her living facility  UTI (urinary tract infection)   Assessment & Plan    · Leukocytosis, urine culture with >100,000 e  coli  Previously had UTI/asx bacteriuria with ESBL  · ID consulted, appreciate input  Patient is not able to provide history 2/2 dementia  Currently on IV Invanz  · Concern that Pj Wheeler would affect Depakote levels  D/w RN at [facility], patient is on Depakote sprinkles not for history of seizures but for mood stabilizer  · Procalcitonin elevated at 23 81-->25 97-->12 79-->6 23  · In setting of urinary retention s/p briones decompression  · Follow-up final urine culture and sensitivities  BC x 2 negative @ 48 hrs  · Monitor temps, WBC, clinical course  · Possibly contributing to encephalopathy  · Plan to treat for 7 days total     Urinary retention   Assessment & Plan    · Urinary retention, POA, ?complicated by UTI  · S/p briones decompression  · Urology consulted, appreciate input    US kidney/bladder shows echogenic, mildly atrophic kidneys, no hydronephrosis  · Urology recommends voiding trial when mental status improved and more mobile  · Will d/c with briones in place, voiding trial at living facility     YANNI (acute kidney injury) ([facility] [address] )   Assessment & Plan    · POA, Cr 1 32 now at 1 10 following IVF  · Likely 2/2 dehydration, urinary retention s/p briones placement   · Monitor BMP   · Resolved     Pulmonary fibrosis ([facility] [address] )   Assessment & Plan    · Recently treated for pneumonia, bronchitis, possible reason for steroid taper  · Would continue with steroid taper - decrease from 10 mg to 5 mg today as this can contribute to agitation/delirium/confusion, continue to taper  · Consider prednisone taper to be contributing to leukocytosis  · Continue to give supportive care and oxygen as needed     Chronic combined systolic and diastolic heart failure (HCC)   Assessment & Plan    · BNP is elevated, Troponin at 0 05-->0 04  · Appears euvolemic on exam, CXR negative for acute cardiopulmonary disease  · Cardiology stopped all diuretics in past   · Echo from 2017 shows EF 55%, no regional wall motion abnormalities  · Monitor     Ambulatory dysfunction   Assessment & Plan    · PT/OT evaluations     Dementia without behavioral disturbance   Assessment & Plan    · Patient has alzheimer's dementia with likely progression  · Follows with Dr Alvis Closs with geriatrics  · Geriatrics consulted, appreciate input  Recommend to continue with current therapies, schedule tylenol, and consult palliative care  · Palliative care consulted, appreciate input  After discussion with family they agree her quality of life has greatly diminished  Currently, goals are to presently treat reversible causes (i e  UTI) with eventual goal to transition to hospice care at her current living facility  Essential hypertension   Assessment & Plan    · Continue with propranolol 20 mg q12hr       VTE Pharmacologic Prophylaxis:   Pharmacologic: Enoxaparin (Lovenox)  Mechanical VTE Prophylaxis in Place: Yes    Patient Centered Rounds: I have performed bedside rounds with nursing staff today  Discussions with Specialists or Other Care Team Provider: RN    Education and Discussions with Family / Patient: patient  Time Spent for Care: 20 minutes  More than 50% of total time spent on counseling and coordination of care as described above  Current Length of Stay: 1 day(s)    Current Patient Status: Inpatient   Certification Statement: The patient will continue to require additional inpatient hospital stay due to IV abx    Discharge Plan: when transition to PO abx, coordinate transfer back to living facility    Code Status: Level 3 - DNAR and DNI      Subjective:   Patient unable to provide history  NAD       Objective:     Vitals:   Temp (24hrs), Av °F (36 7 °C), Min:97 5 °F (36 4 °C), Max:98 4 °F (36 9 °C)    Temp:  [97 5 °F (36 4 °C)-98 4 °F (36 9 °C)] 98 4 °F (36 9 °C)  HR:  [69-78] 78  Resp:  [16-18] 18  BP: (122-187)/(58-85) 187/85  SpO2:  [97 %-99 %] 97 %  Body mass index is 17 68 kg/m²  Input and Output Summary (last 24 hours): Intake/Output Summary (Last 24 hours) at 12/07/18 0933  Last data filed at 12/07/18 0545   Gross per 24 hour   Intake              450 ml   Output             1175 ml   Net             -725 ml       Physical Exam:     Physical Exam   Constitutional: No distress  Chronically ill appearing   HENT:   Head: Normocephalic and atraumatic  Mouth/Throat: Oropharynx is clear and moist    Eyes: Pupils are equal, round, and reactive to light  EOM are normal  No scleral icterus  Neck: Normal range of motion  Neck supple  Cardiovascular: Normal rate, regular rhythm and normal heart sounds  No murmur heard  Pulmonary/Chest: Breath sounds normal  No respiratory distress  She has no wheezes  She has no rales  Poor effort   Abdominal: Soft  Bowel sounds are normal  She exhibits no distension  There is no tenderness  Musculoskeletal: Normal range of motion  She exhibits no edema or tenderness  Neurological: She is alert  No cranial nerve deficit  Not oriented   Skin: She is not diaphoretic  Multiple areas of ecchymosis noted of extremities   Psychiatric: She has a normal mood and affect  Vitals reviewed  Additional Data:     Labs:      Results from last 7 days  Lab Units 12/07/18  0448  12/04/18  0654   WBC Thousand/uL 11 37*  < > 23 12*   HEMOGLOBIN g/dL 11 5  < > 11 1*   HEMATOCRIT % 38 4  < > 35 5   PLATELETS Thousands/uL 251  < > 277   BANDS PCT %  --   --  9*   NEUTROS PCT % 80*  < >  --    LYMPHS PCT % 13*  < >  --    LYMPHO PCT %  --   --  2*   MONOS PCT % 6  < >  --    MONO PCT %  --   --  3*   EOS PCT % 1  < > 0   < > = values in this interval not displayed      Results from last 7 days  Lab Units 12/07/18  0448  12/05/18  0438   SODIUM mmol/L 139  < > 138   POTASSIUM mmol/L 3 9  < > 3 6   CHLORIDE mmol/L 108  < > 106   CO2 mmol/L 24  < > 24   BUN mg/dL 36*  < > 47*   CREATININE mg/dL 1 10  < > 1 09   ANION GAP mmol/L 7  < > 8   CALCIUM mg/dL 9 4  < > 9 2   ALBUMIN g/dL  --   --  2 5*   TOTAL BILIRUBIN mg/dL  --   --  0 40   ALK PHOS U/L  --   --  64   ALT U/L  --   --  17   AST U/L  --   --  21   GLUCOSE RANDOM mg/dL 102  < > 88   < > = values in this interval not displayed  Results from last 7 days  Lab Units 12/07/18  0448 12/06/18  0527 12/05/18  0438 12/04/18  1352   PROCALCITONIN ng/ml 6 23* 12 79* 25 97* 23 81*           * I Have Reviewed All Lab Data Listed Above  * Additional Pertinent Lab Tests Reviewed: All Labs Within Last 24 Hours Reviewed    Imaging:    Imaging Reports Reviewed Today Include: none  Imaging Personally Reviewed by Myself Includes:  none    Recent Cultures (last 7 days):       Results from last 7 days  Lab Units 12/04/18  1505 12/04/18  1352 12/04/18  0657   BLOOD CULTURE  No Growth at 48 hrs  No Growth at 48 hrs    --    URINE CULTURE   --   --  >100,000 cfu/ml Escherichia coli ESBL*  >100,000 cfu/ml Proteus mirabilis*       Last 24 Hours Medication List:     Current Facility-Administered Medications:  acetaminophen 975 mg Oral Q8H [address] Yoni Brown MD    aluminum-magnesium hydroxide-simethicone 30 mL Oral Q6H PRN Chantelle Byrd MD    divalproex sodium 125 mg Oral HS Chantelle Byrd MD    docusate sodium 100 mg Oral BID Chantelle Byrd MD    enoxaparin 30 mg Subcutaneous Daily Chantelle Byrd MD    [START ON 12/31/2018] ergocalciferol 50,000 Units Oral Q28 Days Chantelle Byrd MD    ertapenem 1,000 mg Intravenous Q24H Cruz Bingham MD Last Rate: Stopped (12/06/18 1400)   influenza vaccine 0 5 mL Intramuscular Prior to discharge Coretta Lester DO    multivitamin-minerals 1 tablet Oral Daily Chantelle Byrd MD    ondansetron 4 mg Intravenous Q6H PRN Chantelle Byrd MD    predniSONE 5 mg Oral Daily Lyndsey Wasserman PA-C    propranolol 20 mg Oral Q12H 8200 Talisha Parekh MD    senna 1 tablet Oral Daily Chantelle Byrd MD Today, Patient Was Seen By: Regina Hays PA-C    ** Please Note: Dictation voice to text software may have been used in the creation of this document   **

## 2018-12-07 NOTE — ASSESSMENT & PLAN NOTE
· Patient has alzheimer's dementia with likely progression  · Follows with Dr Ruel Wright with geriatrics  · Geriatrics consulted, appreciate input  Recommend to continue with current therapies, schedule tylenol, and consult palliative care  · Palliative care consulted, appreciate input  After discussion with family they agree her quality of life has greatly diminished  Currently, goals are to presently treat reversible causes (i e  UTI) with eventual goal to transition to hospice care at her current living facility

## 2018-12-07 NOTE — HOSPICE NOTE
Spoke with brother, Elzbieta Flores, pts POA via phone  Consents faxed to him as requested, he signed while reviewing via phone and return faxed, copies of consents will accompany pt back to [facility]  Dr Marco Vines from palliative called [facility] heart pharmacy and ordered comfort medications so pt will have meds when she arrives back at facility    Liaison called [facility] and spoke with Damon Adames, she would like to have [place] collect their equipment today and is aware [employer] [employer] will deliver equipment tomorrow by noon, hospital bed, rashid lift with sling, bed pads, over bed table and reclining wheelchair  LEONIDAS Mays to set pt up for transport back to facility tomorrow afternoon and facility aware, as is brother Elzbieta Flores, that pt will be opened to IDx hospice once back at the facility tomorrow

## 2018-12-07 NOTE — ASSESSMENT & PLAN NOTE
· POA, Cr 1 32 now at 1 10 following IVF  · Likely 2/2 dehydration, urinary retention s/p briones placement   · Monitor BMP   · Resolved

## 2018-12-07 NOTE — PROGRESS NOTES
Progress Note - Infectious Disease   Letty Hunter 80 y o  female MRN: [id number]  Unit/Bed#: [facility] 901-01 Encounter: [id number]      Impression/Plan:  1  Leukocytosis-probable secondary to urinary tract infection   Possibly secondary to another infectious or noninfectious etiology   Fortunately the patient remains hemodynamically stable and nontoxic despite the leukocytosis   She has been afebrile and does not have any current symptomatology  Procalcitonin level has continued to decrease  -antibiotics as below  -additional workup as needed     2  Probable urinary tract infection-the procalcitonin level is quite elevated and the leukocytosis persists   Urine culture is growing an E coli    Patient denies having any urinary symptoms but she has a Vargas catheter in place now  Dominick Li has also been afebrile and hemodynamically stable   She has been previously at least colonized or infected with an ESBL E coli  Patient on valproic acid for mood stabilization and not seizure so safe toe AH chew despite interaction with ertapenem  -continue ertapenem for now at current dose until patient transition to hospice     3   Acute urinary retention-possibly complicated by UTI   Unclear etiology   The patient has had a Vargas catheter placed  Renal ultrasound without hydronephrosis  -Vargas drainage  -urology evaluation pending  -no additional ID workup for now     4  Elevated serum creatinine-likely multifactorial including obstruction, and possible pre renal issues   Renal function has improved  -Vargas drainage  -volume management     5   Acute encephalopathy-in the setting of baseline Alzheimer dementia   Suspect metabolic issues, and urinary retention playing a role   Possible infection as above   Patient's cognition has apparently  improved but remains quite abnormal    -monitor cognition  -treat metabolic issues  -follow-up urine culture and blood culture     Discussed in detail with Urology in the primary service    Patient to be transition to hospice care at the nursing home at the time of discharge  We will sign off  Please call if questions  Antibiotics:  Ertapenem 3    Subjective:  Patient has no fever, chills, sweats; no reported nausea, vomiting, diarrhea; no cough, shortness of breath; no pain  No new symptoms  Objective:  Vitals:  Temp:  [97 5 °F (36 4 °C)-98 4 °F (36 9 °C)] 98 4 °F (36 9 °C)  HR:  [69-78] 78  Resp:  [16-18] 18  BP: (122-187)/(58-85) 187/85  SpO2:  [97 %-99 %] 97 %  Temp (24hrs), Av °F (36 7 °C), Min:97 5 °F (36 4 °C), Max:98 4 °F (36 9 °C)  Current: Temperature: 98 4 °F (36 9 °C)    Physical Exam:   General Appearance:  Alert, confused, hard understand  Throat: Oropharynx dry without lesions  Lungs:   Decreased breath sounds bilaterally; no wheezes, rhonchi or rales; respirations unlabored   Heart:  RRR; no murmur, rub or gallop   Abdomen:   Soft, non-tender, non-distended, positive bowel sounds  Extremities: No clubbing, cyanosis or edema   Skin: No new rashes or lesions  No draining wounds noted  Labs, Imaging, & Other studies:   All pertinent labs and imaging studies were personally reviewed    Results from last 7 days  Lab Units 18  0526 18  0438   WBC Thousand/uL 11 37* 11 94* 19 17*   HEMOGLOBIN g/dL 11 5 11 0* 10 4*   PLATELETS Thousands/uL 251 235 285       Results from last 7 days  Lab Units 18  0526 18  0438   SODIUM mmol/L 139 140 138   POTASSIUM mmol/L 3 9 3 8 3 6   CHLORIDE mmol/L 108 109* 106   CO2 mmol/L 24 24 24   BUN mg/dL 36* 38* 47*   CREATININE mg/dL 1 10 1 07 1 09   EGFR ml/min/1 73sq m 46 47 46   CALCIUM mg/dL 9 4 9 4 9 2   AST U/L  --   --  21   ALT U/L  --   --  17   ALK PHOS U/L  --   --  64       Results from last 7 days  Lab Units 18  1505 18  1352 18  0657   BLOOD CULTURE  No Growth at 48 hrs  No Growth at 48 hrs    --    URINE CULTURE   --   --  >100,000 cfu/ml Escherichia coli ESBL* >100,000 cfu/ml Proteus mirabilis*

## 2018-12-07 NOTE — ASSESSMENT & PLAN NOTE
· Leukocytosis, urine culture with >100,000 e  coli  Previously had UTI/asx bacteriuria with ESBL  · ID consulted, appreciate input  Patient is not able to provide history 2/2 dementia  Currently on IV Invanz  · Concern that Carla Glassing would affect Depakote levels  D/w RN at [facility], patient is on Depakote sprinkles not for history of seizures but for mood stabilizer  · Procalcitonin elevated at 23 81-->25 97-->12 79-->6 23  · In setting of urinary retention s/p briones decompression  · Follow-up final urine culture and sensitivities    BC x 2 negative @ 48 hrs  · Monitor temps, WBC, clinical course  · Possibly contributing to encephalopathy  · Plan to treat for 7 days total

## 2018-12-07 NOTE — PROGRESS NOTES
Called in and faxed over prescription for comfort meds to [facility] KRANTHI CHAU [facility]): oxycodone 5mg/5mL oral solution, 0 5mg (0 5mL) q2H prn for pain/shortness of breath, and ativan intensol 2mg/mL oral concentrate, 0 5mg (0 25mL) q2H prn for anxiety/SOB      Romel Mcintosh MD  [facility] Fellow
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
## 2018-12-08 PROBLEM — E44.1 MILD MALNUTRITION (HCC): Status: ACTIVE | Noted: 2018-01-01

## 2018-12-08 PROBLEM — E44.1 MILD PROTEIN-CALORIE MALNUTRITION (HCC): Status: ACTIVE | Noted: 2018-01-01

## 2018-12-08 NOTE — ASSESSMENT & PLAN NOTE
Malnutrition Findings:   Malnutrition type: Chronic illness (Mild malnutriiton r/t Alz Dementia as evidenced by fat/muscle depletion of orbital/temple areas, consuming <50-75% estimated energy needs  Treated with supplements)  Degree of Malnutrition: Malnutrition of mild degree    BMI Findings: Body mass index is 18 01 kg/m²

## 2018-12-08 NOTE — ASSESSMENT & PLAN NOTE
· Patient had change in mentation at her facility and was transferred here  Per ED notes, patient was agitated and then found on the floor of her room  · CT head negative for acute intracranial abnormality  · Patient does have hx of dementia  Geriatrics consulted  Recommend to schedule tylenol, taper prednisone as this can contribute to confusion/agitation  Decrease from 10 mg to 5 mg today  Recommend to continue with depakote sprinkles and consult palliative care  · Etiology likely 2/2 UTI, YANNI, urinary rention - see related plans   · Monitor, after discussion with patient's family member Jerod Carrera) it seems she likely is at her baseline  He reports she is not very interactive and is not recognizing family members anymore  He is in agreement that her quality of life has greatly diminished  Agreeable to speaking with palliative care  · Palliative care consulted  After discussion with family, they would want to treat reversible causes presently (i e  UTI) but would then be interested in pursuing hospice services from her living facility    However, this am pt is unresponsive only to pain, pt was placed on nonrebreather over night then transitioned to face mask this am    · Unable to take in oral meds

## 2018-12-08 NOTE — PROGRESS NOTES
Progress note - Palliative and Supportive Care   Lindsey Valle 80 y o  female [id number]    Assessment:     Patient Active Problem List   Diagnosis    Essential hypertension    Dementia without behavioral disturbance    Pneumonia    Multiple fractures    Ambulatory dysfunction    Acute bronchopneumonia    AAA (abdominal aortic aneurysm) (HCC)    Chronic combined systolic and diastolic heart failure (HCC)    Osteoporosis    Pulmonary fibrosis (HCC)    PM[facility]algia [facility]a) [facility])    Tobacco use disorder    Bilateral edema of lower extremity    Heart failure (HCC)    UTI (urinary tract infection)    Toxic metabolic encephalopathy    YANNI (acute kidney injury) ([facility] [address] [facility]    Urinary retention    Debility    Fall    Mild malnutrition ([facility] [address] )    Mild protein-calorie malnutrition ([facility] [address] )         Plan:  1  Symptom management -     Patient unsafe to take oral medications at this time  D/C'd all PO medications  Please reinstitute if patient's mental status improves  Will institute IV comfort meds   - IV morphine 2mg  - IV ativan 0 5mg          2  Goals - Patient already signed onto hospice, but given change in mentation, inability to take PO, and discomfort on exam, I believe she is inpatietn appropriate for better symptom control and need for parenteral medications  Hospice liaison has been notified and will be speaking with patient's brother  Code Status: level 3        I have reviewed the patient's controlled substance dispensing history in the Prescription Drug Monitoring Program in compliance with the [facility] regulations before prescribing any controlled substances  Interval history:       Overnight patient with acute change including rapidly developing hypoxia requiring NRB mask and worsening of mental status  Now not taking any medicaitons by mouth and unresponsive despite prompting  I was asked to reevaluate for potential change in level of care       MEDICATIONS / ALLERGIES:     all current active meds have been reviewed and current meds:   Current Facility-Administered Medications   Medication Dose Route Frequency    acetaminophen (TYLENOL) tablet 975 mg  975 mg Oral Q8H [facility]    aluminum-magnesium hydroxide-simethicone (MYLANTA) 200-200-20 mg/5 mL oral suspension 30 mL  30 mL Oral Q6H PRN    divalproex sodium (DEPAKOTE SPRINKLE) capsule 125 mg  125 mg Oral HS    docusate sodium (COLACE) capsule 100 mg  100 mg Oral BID    enoxaparin (LOVENOX) subcutaneous injection 30 mg  30 mg Subcutaneous Daily    [START ON 12/31/2018] ergocalciferol (VITAMIN D2) capsule 50,000 Units  50,000 Units Oral Q28 Days    ertapenem (INVanz) 1,000 mg in sodium chloride 0 9 % 50 mL IVPB  1,000 mg Intravenous Q24H    influenza vaccine, high-dose (FLUZONE HIGH-DOSE) IM injection AUREA 0 5 mL  0 5 mL Intramuscular Prior to discharge    LORazepam (ATIVAN) 2 mg/mL injection 0 5 mg  0 5 mg Intravenous Q4H PRN    morphine injection 2 mg  2 mg Intravenous Q4H PRN    multivitamin-minerals (CENTRUM) tablet 1 tablet  1 tablet Oral Daily    ondansetron (ZOFRAN) injection 4 mg  4 mg Intravenous Q6H PRN    predniSONE tablet 5 mg  5 mg Oral Daily    propranolol (INDERAL) tablet 20 mg  20 mg Oral Q12H [facility]    senna (SENOKOT) tablet 8 6 mg  1 tablet Oral Daily       Allergies   Allergen Reactions    Sertraline Other (See Comments)     zoloft       OBJECTIVE:    Physical Exam  Physical Exam   Constitutional: Distressed: periodically moaning  HENT:   Head: Atraumatic  Mucous membranes dry   Eyes: Right eye exhibits no discharge  Left eye exhibits no discharge  Pulmonary/Chest: Respiratory distress: labored breathing, currently on NRB mask  Abdominal: She exhibits no distension  Musculoskeletal: She exhibits no tenderness  Neurological:   unrepsonsive to voice or exam     Skin: Skin is warm and dry  She is not diaphoretic  Psychiatric:   Unable to assess   Nursing note and vitals reviewed  Lab Results:    I have personally reviewed pertinent labs  , CBC:   Lab Results   Component Value Date    WBC 15 61 (H) 12/08/2018    HGB 12 9 12/08/2018    HCT 43 7 12/08/2018     (H) 12/08/2018     12/08/2018    MCH 29 4 12/08/2018    MCHC 29 5 (L) 12/08/2018    RDW 16 0 (H) 12/08/2018    MPV 11 6 12/08/2018    NRBC 0 12/08/2018   , CMP:   Lab Results   Component Value Date    SODIUM 144 12/08/2018    K 4 7 12/08/2018     (H) 12/08/2018    CO2 29 12/08/2018    BUN 35 (H) 12/08/2018    CREATININE 1 15 12/08/2018    CALCIUM 9 0 12/08/2018    EGFR 43 12/08/2018         Counseling / Coordination of Care  Total floor / unit time spent today 30+ minutes  Greater than 50% of total time was spent with the patient and / or family counseling and / or coordination of care  A description of the counseling / coordination of care: evaluation for hospice discharge, mgmt of parenteral comfort meds, substnatial med change  s

## 2018-12-08 NOTE — DISCHARGE SUMMARY
Discharge Summary - [facility]    Patient Information: Domonique Veliz 80 y o  female MRN: [id number]  Unit/Bed#: [facility] 901-01 Encounter: [id number]    Discharging Physician / Practitioner: MARTINEZ Blanco  PCP: Josie Cho MD  Admission Date: 12/4/2018  Discharge Date: 12/08/18    Disposition:     Other: Hospice care    Reason for Admission:  Unresponsiveness    Discharge Diagnoses:     Principal Problem:    Toxic metabolic encephalopathy  Active Problems:    UTI (urinary tract infection)    Ambulatory dysfunction    YANNI (acute kidney injury) ([address] )    Essential hypertension    Dementia without behavioral disturbance    Chronic combined systolic and diastolic heart failure (HCC)    Pulmonary fibrosis ([address] )    Urinary retention    Debility    Fall    Mild malnutrition ([address] )    Mild protein-calorie malnutrition ([address] )  Resolved Problems:    Sepsis ([facility] [address] )      Consultations During Hospital Stay:  Dr Sherry Perry infectious disease  Dr Yuli Freire urology  Dr Patrick Gould stone palliative care  Wound care  Procedures Performed:     · CT brain shows stable cerebral atrophy with chronic small vessel ischemic white matter disease  No acute intracranial abnormality  · Chest x-ray portable with no acute cardiopulmonary disease:  · Ultrasound kidney and bladder:Echogenic, mildly atrophic kidneys, indicative of medical renal disease   No hydronephrosis  · Urine culture positive for ESBL and Proteus mirabilis  · Blood cultures x2 show no growth at 72 hours  · Troponin 0 05, 0 04 consecutively  · BNP 9919  · Procalcitonin on admission 23 81 at discharge to hospice care 2 7 8    Significant Findings / Test Results:     · See above    Incidental Findings:   · None    Test Results Pending at Discharge (will require follow up):    · Ongoing blood cultures     Outpatient Tests Requested:  No follow-up testing as patient is being discharged to hospice care  Complications:  None    Hospital Course:     Dany Fregoso is a 80 y o  female patient who originally presented to the hospital on 12/4/2018 due to  unresponsiveness  Patient has history of advanced Alzheimer's dementia she recently seen and treated for bronchitis and pneumonia  Patient has history of pulmonary fibrosis, smoking history was recently started on prednisone  Patient was noted to be lethargic on admission with no ability to give history on admission  Patient was unable to give any pertinent history due to her baseline cognitive state and history was obtained through chart and discussion with the ER nursing staff  Patient was seen by geriatric team and apparently alert and interactive  However morning of admission patient was found down by nursing home staff, with notable laceration to the left shoulder  She is brought to the emergency department for further evaluation on arrival was found to be awake but unable to provide bite history patient underwent CT of the head which was negative  Labs revealed evidence of an elevated white count, acute kidney injury and an abnormal urinalysis  Urine cultures and blood cultures were obtained patient was initiated on IV ceftriaxone and admitted for further evaluation per our specialty teams  Patient was seen by infectious disease, geriatric Medicine Neurology and palliative care, along with wound care  Patient was noted to have multiple wound care concerns please see wound care notation for detailed discussion  Patient was seen by infectious disease and urology were in acute urinary retention protocol was placed for complicated UTI  Per Infectious Disease patient was noted to be previously colonized or infected with ESBL E coli  No additional antibiotics were recommended if the patient were to become afebrile more ill recommendations were to start ertapenem 1 g IV Q 24 while waiting for additional data    Renal ultrasound was obtained for above-mentioned results  Extensive discussion was had with the patient's POA her brother, in regards to goals of care  Decisions were made to return patient back to facility to transition to hospice care  However on morning of 12/08/2018 upon my evaluation patient is very difficult to arouse and is mostly unresponsive although does appear to have pain when moved or manipulated  After discussion with hospice team recommendations are to discharge patient to inpatient hospice which has been set up by case management  Detailed discussion has been had with the son in regards to potential plan for hospice prior to confirmation  Discussion with case management reveals that hospice care will call and discuss with patient's brother  Condition at Discharge: serious     Discharge Day Visit / Exam:     Subjective:  Patient is mostly nonresponsive  Slightly arousable to painful stimuli  Becomes very uncomfortable and maneuvered ring manipulating patient  Patient is tachypneic on face mask at this time  Seems restless  Unable to hydrate or take in any oral feedings at this time  Vitals: Blood Pressure: 139/68 (12/08/18 0718)  Pulse: 80 (12/08/18 0718)  Temperature: 97 7 °F (36 5 °C) (12/08/18 0718)  Temp Source: Axillary (12/08/18 0718)  Respirations: 12 (12/08/18 0718)  Height: 5' 6" (167 6 cm) (12/04/18 0537)  Weight - Scale: 50 6 kg (111 lb 9 6 oz) (12/08/18 0600)  SpO2: 100 % (12/08/18 0718)  Exam:   Physical Exam   Constitutional: She appears well-developed and well-nourished  No distress  HENT:   Head: Normocephalic  Mouth/Throat: No oropharyngeal exudate  Eyes: Right eye exhibits no discharge  Left eye exhibits no discharge  No scleral icterus  Eyes rolled back     Neck: No JVD present  No tracheal deviation present  No thyromegaly present  Cardiovascular: Normal rate  Exam reveals no gallop and no friction rub  No murmur heard  Pulmonary/Chest: No stridor  She is in respiratory distress  She has no wheezes  She has rales (rhonchi )   She exhibits no tenderness  Tachypnea/ hypoxic on room air    Abdominal: She exhibits no distension and no mass  There is no tenderness  There is no rebound and no guarding  Musculoskeletal: She exhibits no edema, tenderness or deformity  Lymphadenopathy:     She has no cervical adenopathy  Neurological:   Barely arousable    Skin: No rash noted  She is not diaphoretic  No erythema  No pallor  Psychiatric:   Flat non responsive A(responsive to painful stimuli)        Discussion with Family: brother     Discharge instructions/Information to patient and family:   See after visit summary for information provided to patient and family  Provisions for Follow-Up Care:  See after visit summary for information related to follow-up care and any pertinent home health orders  Planned Readmission: hospice discharge     Discharge Statement:  I spent 50 minutes discharging the patient  This time was spent on the day of discharge  I had direct contact with the patient on the day of discharge  Greater than 50% of the total time was spent examining patient, answering all patient questions, arranging and discussing plan of care with patient as well as directly providing post-discharge instructions  Additional time then spent on discharge activities  Discharge Medications:  See after visit summary for reconciled discharge medications provided to patient and family        ** Please Note: This note has been constructed using a voice recognition system **

## 2018-12-08 NOTE — ASSESSMENT & PLAN NOTE
· BNP is elevated, Troponin at 0 05-->0 04  · Appears euvolemic on exam, CXR negative for acute cardiopulmonary disease  · Cardiology stopped all diuretics in past   · Echo from 2017 shows EF 55%, no regional wall motion abnormalities  · Monitor  · Pt today is non responsive on my exam, sob on face mask was on nonrebreather for oxygen desaturation overnight   · Consulted hospice as pt was supposed to be discharged to facility to hospice however unsure as to whether pt will make it to facility

## 2018-12-08 NOTE — SOCIAL WORK
Pt was reevaluated by [facility] and now determined to be inpatient hospice appropriate  Pt for d/c today to [facility], to be transported by [facility] at 6:45pm via bls  CM completed facility transfer and cmn form  CM notified RN and Tammi Hinkle of d/c arrangements  [facility] notified Pt's brother of d/c arrangements  CM also notified Néstor Mcdowell of [facility], the Pt will not be returning to their facility

## 2018-12-08 NOTE — PROGRESS NOTES
At around 3:00am while giving shift report, we went to reposition her in bed when I noticed her O2 was around 82  Throughout my shift she seemed to be a heavy mouth breather and almost snores when she is still awake, but when she took a deeper breath after waking her up it went above 90  I put her on a nasal canula at 6 liters in which she wouldn't go above 84%  I paged [facility] and respiratory to come and see her, meanwhile I placed a non-rebreather on her that brought her up to 96%  Pt doesn't seem to be in distress, she went back to sleep, but is taking shallow breaths at 14 a minute  Sheri Khan from [place] said to keep her on the non-rebreather and to notify her if her O2 saturations go down again

## 2018-12-08 NOTE — ASSESSMENT & PLAN NOTE
· PT/OT evaluations pt at this time with no ability to ambulate   · Pt at this time with decline over night   · Discussed with pts brother whom states she was "doing ok yesterday"

## 2018-12-08 NOTE — ASSESSMENT & PLAN NOTE
· Leukocytosis, urine culture with >100,000 e  coli  Previously had UTI/asx bacteriuria with ESBL  · ID consulted, appreciate input  Patient is not able to provide history 2/2 dementia  Currently on IV Invanz  · Concern that Abad Arnettker would affect Depakote levels  D/w RN at [facility], patient is on Depakote sprinkles not for history of seizures but for mood stabilizer  · Procalcitonin elevated at 23 81-->25 97-->12 79-->6 23  · In setting of urinary retention s/p briones decompression  · Follow-up final urine culture and sensitivities    BC x 2 negative @ 48 hrs  · Monitor temps, WBC, clinical course  · Possibly contributing to encephalopathy  · Plan to treat for 7 days total

## 2018-12-08 NOTE — PROGRESS NOTES
Progress Note - Darlene Peters 7/7/1932, 80 y o  female MRN: [id number]    Unit/Bed#: [facility] 901-01 Encounter: [id number]    Primary Care Provider: Pari Regalado MD   Date and time admitted to hospital: 12/4/2018  5:25 AM    Conversation had with brother who states he understands if worse mental status  He will wait to hear from hospice further plan based on new evaluation  Pt became briefly responsive then became unresponsive again for dr Lori Santacruz palliative this am     * Toxic metabolic encephalopathy   Assessment & Plan    · Patient had change in mentation at her facility and was transferred here  Per ED notes, patient was agitated and then found on the floor of her room  · CT head negative for acute intracranial abnormality  · Patient does have hx of dementia  Geriatrics consulted  Recommend to schedule tylenol, taper prednisone as this can contribute to confusion/agitation  Decrease from 10 mg to 5 mg today  Recommend to continue with depakote sprinkles and consult palliative care  · Etiology likely 2/2 UTI, YANNI, urinary rention - see related plans   · Monitor, after discussion with patient's family member Nemo Kirk) it seems she likely is at her baseline  He reports she is not very interactive and is not recognizing family members anymore  He is in agreement that her quality of life has greatly diminished  Agreeable to speaking with palliative care  · Palliative care consulted  After discussion with family, they would want to treat reversible causes presently (i e  UTI) but would then be interested in pursuing hospice services from her living facility  However, this am pt is unresponsive only to pain, pt was placed on nonrebreather over night then transitioned to face mask this am    · Unable to take in oral meds      UTI (urinary tract infection)   Assessment & Plan    · Leukocytosis, urine culture with >100,000 e  coli    Previously had UTI/asx bacteriuria with ESBL  · ID consulted, appreciate input  Patient is not able to provide history 2/2 dementia  Currently on IV Invanz  · Concern that Rondal Segundo would affect Depakote levels  D/w RN at [facility], patient is on Depakote sprinkles not for history of seizures but for mood stabilizer  · Procalcitonin elevated at 23 81-->25 97-->12 79-->6 23  · In setting of urinary retention s/p briones decompression  · Follow-up final urine culture and sensitivities    BC x 2 negative @ 48 hrs  · Monitor temps, WBC, clinical course  · Possibly contributing to encephalopathy  · Plan to treat for 7 days total     YANNI (acute kidney injury) ([facility] [address] )   Assessment & Plan    · POA, Cr 1 32 now at 1 15 following IVF  · Likely 2/2 dehydration, urinary retention s/p briones placement with small amt of yellow urine  · Monitor BMP   · Resolved     Ambulatory dysfunction   Assessment & Plan    · PT/OT evaluations pt at this time with no ability to ambulate   · Pt at this time with decline over night   · Discussed with pts brother whom states she was "doing ok yesterday"      Pulmonary fibrosis ([facility] [address] )   Assessment & Plan    · Recently treated for pneumonia, bronchitis, possible reason for steroid taper  · Would continue with steroid taper - decrease from 10 mg to 5 mg today as this can contribute to agitation/delirium/confusion, continue to taper  · Consider prednisone taper to be contributing to leukocytosis  · Continue to give supportive care and oxygen as needed     Chronic combined systolic and diastolic heart failure (HCC)   Assessment & Plan    · BNP is elevated, Troponin at 0 05-->0 04  · Appears euvolemic on exam, CXR negative for acute cardiopulmonary disease  · Cardiology stopped all diuretics in past   · Echo from 2017 shows EF 55%, no regional wall motion abnormalities  · Monitor  · Pt today is non responsive on my exam, sob on face mask was on nonrebreather for oxygen desaturation overnight   · Consulted hospice as pt was supposed to be discharged to facility to hospice however unsure as to whether pt will make it to facility      Dementia without behavioral disturbance   Assessment & Plan    · Patient has alzheimer's dementia with likely progression  · Follows with Dr Maloney Hidden with geriatrics  · Geriatrics consulted, appreciate input  Recommend to continue with current therapies, schedule tylenol, and consult palliative care  · Palliative care consulted, appreciate input  After discussion with family they agree her quality of life has greatly diminished  Currently, goals are to presently treat reversible causes (i e  UTI) with eventual goal to transition to hospice care at her current living facility  · HOWEVER OVER NIGHT PT WITH DECLINE IN RESPIRATORY STATUS: spoke with brother who states she does not want aggressive treatment and agrees to have hospice see the pt here as I am not sure pt will make it to facility she is in and out of responsiveness a this time pt not responding to me at all groaning with pain   · Spoke with dr Keyla Martell palliative to have hospice see pt      Essential hypertension   Assessment & Plan    · Continue with propranolol 20 mg q12hr         VTE Pharmacologic Prophylaxis:   Pharmacologic: Enoxaparin (Lovenox)  Mechanical VTE Prophylaxis in Place: Yes    Patient Centered Rounds: I have performed bedside rounds with nursing staff today  Discussions with Specialists or Other Care Team Provider: nursing     Education and Discussions with Family / Patient: patient     Time Spent for Care: 45 minutes  More than 50% of total time spent on counseling and coordination of care as described above      Current Length of Stay: 2 day(s)    Current Patient Status: Inpatient   Certification Statement: The patient will continue to require additional inpatient hospital stay due to reevaluation of worsening state /decline     Discharge Plan: pending reevaluation per hospice     Code Status: Level 3 - DNAR and DNI      Subjective:   Patient is only responsive to pain   Eyes rolled back mouth open with some tachypnea  Patient currently with face mask on  Does not respond to verbal stimuli or questioning  Decline from prior notation  Discussed with son over phone the patient status has declined from day prior  At this time will hold off on discharge to facility and have hospice re-evaluate patient  Objective:     Vitals:   Temp (24hrs), Av 4 °F (36 3 °C), Min:96 6 °F (35 9 °C), Max:98 °F (36 7 °C)    Temp:  [96 6 °F (35 9 °C)-98 °F (36 7 °C)] 97 7 °F (36 5 °C)  HR:  [76-80] 80  Resp:  [12-18] 12  BP: (139-179)/(68-88) 139/68  SpO2:  [95 %-100 %] 100 %  Body mass index is 18 01 kg/m²  Input and Output Summary (last 24 hours): Intake/Output Summary (Last 24 hours) at 18 1207  Last data filed at 18 9491   Gross per 24 hour   Intake                0 ml   Output              700 ml   Net             -700 ml       Physical Exam:     Physical Exam   Constitutional: She appears well-developed  She appears distressed  HENT:   Head: Normocephalic  Mouth/Throat: No oropharyngeal exudate  Eyes: Conjunctivae are normal  Right eye exhibits no discharge  Left eye exhibits discharge  No scleral icterus  Neck: No JVD present  Cardiovascular: Exam reveals no gallop and no friction rub  No murmur heard  Pulmonary/Chest: No stridor  She is in respiratory distress  She has no wheezes  Tachypnea    Abdominal: She exhibits no distension and no mass  There is no tenderness  There is no rebound and no guarding  Musculoskeletal: She exhibits no edema, tenderness or deformity  Lymphadenopathy:     She has no cervical adenopathy  Neurological:   Unresponsive    Skin: No rash noted  She is not diaphoretic  No erythema  No pallor     Psychiatric:   Unresponsive only to pain          Additional Data:     Labs:      Results from last 7 days  Lab Units 18  0442  18  0654   WBC Thousand/uL 15 61*  < > 23 12*   HEMOGLOBIN g/dL 12 9  < > 11 1* HEMATOCRIT % 43 7  < > 35 5   PLATELETS Thousands/uL 178  < > 277   BANDS PCT %  --   --  9*   NEUTROS PCT % 86*  < >  --    LYMPHS PCT % 8*  < >  --    LYMPHO PCT %  --   --  2*   MONOS PCT % 5  < >  --    MONO PCT %  --   --  3*   EOS PCT % 0  < > 0   < > = values in this interval not displayed  Results from last 7 days  Lab Units 12/08/18  0442  12/05/18  0438   SODIUM mmol/L 144  < > 138   POTASSIUM mmol/L 4 7  < > 3 6   CHLORIDE mmol/L 109*  < > 106   CO2 mmol/L 29  < > 24   BUN mg/dL 35*  < > 47*   CREATININE mg/dL 1 15  < > 1 09   ANION GAP mmol/L 6  < > 8   CALCIUM mg/dL 9 0  < > 9 2   ALBUMIN g/dL  --   --  2 5*   TOTAL BILIRUBIN mg/dL  --   --  0 40   ALK PHOS U/L  --   --  64   ALT U/L  --   --  17   AST U/L  --   --  21   GLUCOSE RANDOM mg/dL 155*  < > 88   < > = values in this interval not displayed  Results from last 7 days  Lab Units 12/08/18  0442 12/07/18  0448 12/06/18  0527 12/05/18  0438 12/04/18  1352   PROCALCITONIN ng/ml 2 78* 6 23* 12 79* 25 97* 23 81*           * I Have Reviewed All Lab Data Listed Above  * Additional Pertinent Lab Tests Reviewed: All Labs Within Last 24 Hours Reviewed    Imaging:    Imaging Reports Reviewed Today Include: reviewed       Recent Cultures (last 7 days):       Results from last 7 days  Lab Units 12/04/18  1505 12/04/18  1352 12/04/18  0657   BLOOD CULTURE  No Growth at 72 hrs   No Growth at 72 hrs   --    URINE CULTURE   --   --  >100,000 cfu/ml Escherichia coli ESBL*  >100,000 cfu/ml Proteus mirabilis*       Last 24 Hours Medication List:     Current Facility-Administered Medications:  acetaminophen 975 mg Oral Q8H [address] Claribel Santo MD    aluminum-magnesium hydroxide-simethicone 30 mL Oral Q6H PRN Vidhya Rahman MD    divalproex sodium 125 mg Oral HS Vidhya Rahman MD    docusate sodium 100 mg Oral BID Vidhya Rahman MD    enoxaparin 30 mg Subcutaneous Daily Vidhya Rahman MD    [START ON 12/31/2018] ergocalciferol 50,000 Units Oral Q28 Days Vidhya Rahman MD    ertapenem 1,000 mg Intravenous Q24H Marj Alves MD Last Rate: 1,000 mg (12/08/18 1128)   influenza vaccine 0 5 mL Intramuscular Prior to discharge [address],     multivitamin-minerals 1 tablet Oral Daily Vidhya Rahman MD    ondansetron 4 mg Intravenous Q6H PRN Vidhya Rahman MD    predniSONE 5 mg Oral Daily Lyndsey Wasserman PA-C    propranolol 20 mg Oral Q12H [address] [facility] [employer] Lucero MD    senna 1 tablet Oral Daily Vidhya Rahman MD         Today, Patient Was Seen By: MARTINEZ Escobedo    ** Please Note: Dictation voice to text software may have been used in the creation of this document   **

## 2018-12-08 NOTE — SOCIAL WORK
CM received a call from Chioma Whitney, giving Maria Victoria Arora [phone] for a bls transport for Pt, that will be good for the next five days, starting today  CM made Refugio Jacobson aware the Pt will not be d/c today

## 2018-12-08 NOTE — DISCHARGE INSTRUCTIONS
Skin Care orders as needed for comfort   1-Hydraguard to sacrum, buttocks and heels BID and PRN  2-Soft care cushion when out of bed in chair  3-Moisturize skin daily with skin nourishing cream  4-Elevate heels to offload pressure  5-Turn/reposition q2h for pressure re-distribution on skin  6  Left shoulder and left knee skin tears - cleanse with soap and water then apply dermagran top with 4 x 4 and change every 3 days    Urinary Tract Infection in Women   WHAT YOU NEED TO KNOW:   A urinary tract infection (UTI) is caused by bacteria that get inside your urinary tract  Most bacteria that enter your urinary tract come out when you urinate  If the bacteria stay in your urinary tract, you may get an infection  Your urinary tract includes your kidneys, ureters, bladder, and urethra  Urine is made in your kidneys, and it flows from the ureters to the bladder  Urine leaves the bladder through the urethra  A UTI is more common in your lower urinary tract, which includes your bladder and urethra  DISCHARGE INSTRUCTIONS:   Seek care immediately if:   · You are urinating very little or not at all  · You have a high fever with shaking chills  · You have side or back pain that gets worse  Contact your healthcare provider if:   · You have a fever  · You do not feel better after 2 days of taking antibiotics  · You are vomiting  · You have questions or concerns about your condition or care  Medicines:   · Antibiotics  help fight a bacterial infection  · Medicines  may be given to decrease pain and burning when you urinate  They will also help decrease the feeling that you need to urinate often  These medicines will make your urine orange or red  · Take your medicine as directed  Contact your healthcare provider if you think your medicine is not helping or if you have side effects  Tell him or her if you are allergic to any medicine  Keep a list of the medicines, vitamins, and herbs you take  Include the amounts, and when and why you take them  Bring the list or the pill bottles to follow-up visits  Carry your medicine list with you in case of an emergency  Follow up with your healthcare provider as directed:  Write down your questions so you remember to ask them during your visits  Prevent another UTI:   · Empty your bladder often  Urinate and empty your bladder as soon as you feel the need  Do not hold your urine for long periods of time  · Wipe from front to back after you urinate or have a bowel movement  This will help prevent germs from getting into your urinary tract through your urethra  · Drink liquids as directed  Ask how much liquid to drink each day and which liquids are best for you  You may need to drink more liquids than usual to help flush out the bacteria  Do not drink alcohol, caffeine, or citrus juices  These can irritate your bladder and increase your symptoms  Your healthcare provider may recommend cranberry juice to help prevent a UTI  · Urinate after you have sex  This can help flush out bacteria passed during sex  · Do not douche or use feminine deodorants  These can change the chemical balance in your vagina  · Change sanitary pads or tampons often  This will help prevent germs from getting into your urinary tract  · Do pelvic muscle exercises often  Pelvic muscle exercises may help you start and stop urinating  Strong pelvic muscles may help you empty your bladder easier  Squeeze these muscles tightly for 5 seconds like you are trying to hold back urine  Then relax for 5 seconds  Gradually work up to squeezing for 10 seconds  Do 3 sets of 15 repetitions a day, or as directed  © 2017 [address] [employer] Information is for End User's use only and may not be sold, redistributed or otherwise used for commercial purposes   All illustrations and images included in CareNotes® are the copyrighted property of A D [employer] M [employer]  or [employer]  The above information is an  only  It is not intended as medical advice for individual conditions or treatments  Talk to your doctor, nurse or pharmacist before following any medical regimen to see if it is safe and effective for you

## 2018-12-08 NOTE — SOCIAL WORK
CM arranged tentative ambulette transport for Saturday, December 8th at 11 am with Edwige Carmona at [facility]     Transport pending reply from Ms Brunson at [facility]  CM will follow

## 2018-12-08 NOTE — ASSESSMENT & PLAN NOTE
· POA, Cr 1 32 now at 1 15 following IVF  · Likely 2/2 dehydration, urinary retention s/p briones placement with small amt of yellow urine  · Monitor BMP   · Resolved

## 2018-12-08 NOTE — ASSESSMENT & PLAN NOTE
· Patient has alzheimer's dementia with likely progression  · Follows with Dr Phyllis Mayes with geriatrics  · Geriatrics consulted, appreciate input  Recommend to continue with current therapies, schedule tylenol, and consult palliative care  · Palliative care consulted, appreciate input  After discussion with family they agree her quality of life has greatly diminished  Currently, goals are to presently treat reversible causes (i e  UTI) with eventual goal to transition to hospice care at her current living facility    · HOWEVER OVER NIGHT PT WITH DECLINE IN RESPIRATORY STATUS: spoke with brother who states she does not want aggressive treatment and agrees to have hospice see the pt here as I am not sure pt will make it to facility she is in and out of responsiveness a this time pt not responding to me at all groaning with pain   · Spoke with dr Suzanne Gerardo palliative to have hospice see pt

## 2018-12-09 LAB
BACTERIA BLD CULT: NORMAL
BACTERIA BLD CULT: NORMAL

## 2018-12-09 NOTE — PROGRESS NOTES
Please see death note:   Called to bedside as pt has passed  Pt was due to be transferred to hospice house however, it was felt at time of discharge that pt would not make it to hospice house  This was cancelled and pt passed at 2030pm on 12/08/2018   See death summary

## 2018-12-09 NOTE — DEATH NOTE
PRONOUNCEMENT OF DEATH     DOA: 2018    DOD: 18    TOD: 2030    CODE STATUS AT TOD: dnr/dni    PATIENT ON HOSPICE?: Being discharged to hospice house    FAMILY NOTIFIED?: yes Suman Sommers?: no    SUSPECTED COD: respiratory death    HOSPITAL PROBLEM LIST:   Patient Active Problem List   Diagnosis    Essential hypertension    Dementia without behavioral disturbance    Pneumonia    Multiple fractures    Ambulatory dysfunction    Acute bronchopneumonia    AAA [facility]dominal aortic aneurysm) [facility])    Chronic combined systolic and diastolic heart failure (HCC)    Osteoporosis    Pulmonary fibrosis (HCC)    PMR (polymyalgia rheumatica) ([facility])    Tobacco use disorder    Bilateral edema of lower extremity    Heart failure ([facility]    UTI (urinary tract infection)    Toxic metabolic encephalopathy    YANNI (acute kidney injury) ([facility]    Urinary retention    Debility    Mild malnutrition ([facility])    Mild protein-calorie malnutrition ([facility] )       PRONOUNCEMENT OF DEATH    I was contacted by nursing staff to evaluate the patient found without vital signs  Patient was identified visually and identification was confirmed by hospital ID bracelet    Personally examined the patient without heart sounds auscultated on exam nor were pulses detected x 2  No breath sounds were appreciated after 2 minutes of auscultation  Pupils were fixed and non-reactive to light  Patient was pronounced dead at this date and time   home is [facility]  Phone number is [phone]  Please kindly review hospital chart for all details of this hospitalization and circumstances leading to death  Death certificate will be signed my attending physician at a later time

## 2018-12-09 NOTE — PROGRESS NOTES
Was notified by the patient's nurse that transportation had arrived to take the patient to the [facility], however her oxygen saturation was 33%  I discussed with on-call Palliative Care physician, Dr Mariangel Norman, who saw the patient earlier today and updated him  He recommended to discontinue transportation for this evening  Nurse notified of discontinuation of discharge order  I spoke with the patient's listed emergency contact, brother Naty Millard, and updated him of the situation  Baljinder Berger said they would be in to the hospital in about 1 hour  Palliative Care had ordered comfort meds earlier today  Patient is listed Level 3 DNR/DNI

## 2018-12-10 NOTE — UTILIZATION REVIEW
Notification of Discharge  This is a Notification of Discharge from our facility [address]  Please be advised that this patient has been discharge from our facility  Below you will find the admission and discharge date and time including the patients disposition  PRESENTATION DATE: 12/4/2018  5:25 AM  IP ADMISSION DATE: 12/6/18 1456  DISCHARGE DATE: 12/8/2018 11:30 PM  DISPOSITION: [id number] [facility] Utilization Review Department  Phone: [phone]; Fax [phone]  ATTENTION: Please call with any questions or concerns to [phone]  and carefully listen to the prompts so that you are directed to the right person  Send all requests for admission clinical reviews, approved or denied determinations and any other requests to fax [phone]   All voicemails are confidential

## 2019-11-27 NOTE — PLAN OF CARE
Problem: PHYSICAL THERAPY ADULT  Goal: Performs mobility at highest level of function for planned discharge setting  See evaluation for individualized goals  Treatment/Interventions: LE strengthening/ROM, Therapeutic exercise, Endurance training, Patient/family training, Equipment eval/education, Bed mobility, Cognitive reorientation, Continued evaluation, Spoke to nursing, Spoke to case management  Equipment Recommended: Wheelchair       See flowsheet documentation for full assessment, interventions and recommendations  Prognosis: Fair  Problem List: Decreased strength, Decreased endurance, Impaired balance, Decreased mobility, Decreased range of motion, Decreased safety awareness, Impaired judgement, Decreased cognition, Pain, Decreased skin integrity  Assessment: Pt is an 80 y o female that presented to B on 12/4/2018 s/p: being found on the ground at [facility]  Pt is seen for PT evaluation on 12/5/2018 with the primary dx: toxic metabolic encephalopathy, UTI, Ambulatory dysfunction    2 pt identifiers were utilized  Pt presents to PT session supine in bed  Pt is classified as high level complexity cased based on: current dx, on-going medical management, multiple lines, fall risk, and prior co-morbidities including: alzheimer disease, CHF, HTN, as well as personal factors including: , current cognitive status and baseline PLOF  Per case management, pt required assistance with ADLs, but she was independent with transfers to W/C  Independent utilizing WC for mobility  Pt functional abilities are currently decreased from her baseline based on current impairments including: decreased cognition, gross bilateral LE and UE weakness, decreased static/dynamic sitting balance, decreased trunk control, and decreased safety awareness  These impairments are currently limiting her ability to perform functional activities including: bed mobility, static sitting activities, and supine<->sit transfer   Pt is currently mod A x2 for rolling in bed and max A x2 for supine <->sit transfer  Unable to assess further mobility due to cog status and pt requesting to " lay back down"  Pt educated on importance of OOB mobility however continues to decline  Despite constant VCs and TCs, pt was unable to follow instructions for mobility tasks  Pt assisted with mobility tasks once PT initiated movements  Pt prognosis is fair to return to her PLOF based on her current cognitive status, decrease functional abilities, and significant impairments limiting her strength and balance  Pt was left in supine in bed at the conclusion of PT session with bed alarm on, with call bell and all other needs within reach and she denies any further questions for PT at this time  PT will continue to follow this patient to improve functional mobility back to her PLOF  Once medically cleared, PT recommends rehab for D/C  Barriers to Discharge: None     Recommendation: Other (Comment) (rehab )     PT - OK to Discharge: Yes (to rehab when medically cleared )    See flowsheet documentation for full assessment  spouse

## 2021-01-06 NOTE — ASSESSMENT & PLAN NOTE
· Patient had change in mentation at her facility and was transferred here  Per ED notes, patient was agitated and then found on the floor of her room  · CT head negative for acute intracranial abnormality  · Patient does have hx of dementia  Geriatrics consulted  Recommend to schedule tylenol, taper prednisone as this can contribute to confusion/agitation  Decrease from 10 mg to 5 mg today  Recommend to continue with depakote sprinkles and consult palliative care  · Etiology likely 2/2 UTI, YANNI, urinary rention - see related plans   · Monitor, after discussion with patient's family member Otero Gracia) it seems she likely is at her baseline  He reports she is not very interactive and is not recognizing family members anymore  He is in agreement that her quality of life has greatly diminished  Agreeable to speaking with palliative care  · Palliative care consulted  After discussion with family, they would want to treat reversible causes presently (i e  UTI) but would then be interested in pursuing hospice services from her living facility  Render Post-Care In The Note: no

## 2022-03-31 NOTE — ASSESSMENT & PLAN NOTE
Received sign out from day team to follow up H/H @ 18:00. H/H 8.2/24.0, up from AM H/H 7.1/21.5. As patient Hg > 8, will hold off on giving any further units pRBC at this time. · Recently treated for pneumonia, bronchitis, possible reason for steroid taper  · Would continue with steroid taper - decrease from 10 mg to 5 mg today as this can contribute to agitation/delirium/confusion, continue to taper  · Consider prednisone taper to be contributing to leukocytosis  · Continue to give supportive care and oxygen as needed

## 2023-05-01 NOTE — PHYSICAL THERAPY NOTE
Physical Therapy Treatment Note       11/24/17 1603   Pain Assessment   Pain Assessment No/denies pain   Pain Score No Pain   Restrictions/Precautions   Other Precautions Cognitive; Chair Alarm; Fall Risk   General   Chart Reviewed Yes   Response to Previous Treatment Patient with no complaints from previous session  Family/Caregiver Present Yes  (brother)   Cognition   Overall Cognitive Status Impaired   Arousal/Participation Alert   Attention Attends with cues to redirect   Orientation Level Oriented to person;Disoriented to time;Disoriented to situation;Disoriented to place   Memory Decreased recall of precautions;Decreased recall of recent events;Decreased short term memory   Following Commands Follows one step commands with increased time or repetition   Subjective   Subjective Pt out of bed in recliner  Pt   offers no c/o pain   " Can you get me my cigarettes?"     Transfers   Sit to Stand 3  Moderate assistance   Additional items Assist x 1; Armrests; Increased time required;Verbal cues   Stand to Sit 3  Moderate assistance   Additional items Assist x 1; Armrests; Increased time required;Verbal cues   Ambulation/Elevation   Gait pattern Excessively slow; Forward Flexion  (b/l knee flexion)   Gait Assistance 3  Moderate assist   Additional items Assist x 1;Verbal cues   Assistive Device Rolling walker   Distance 10'x2 seated rest between gait trials  Balance   Static Sitting Fair +   Static Standing Poor +   Dynamic Standing Poor   Ambulatory Poor   Endurance Deficit   Endurance Deficit (fatigue, generalized weakness and deconditioning  )   Activity Tolerance   Activity Tolerance Patient limited by fatigue   Exercises   Quad Sets Supine;10 reps;AROM; Bilateral   Hip Flexion Supine;10 reps;AROM; Bilateral;AAROM  (SLR aarom R and L le  )   Hip Abduction Sitting;AROM; Bilateral;10 reps   Hip Adduction Sitting;10 reps;AROM; Bilateral  (isometric hip add  )   Knee AROM Long Arc Quad Sitting;10 reps;AROM; Bilateral Ankle Pumps Sitting;10 reps;AROM; Bilateral   Marching Sitting;10 reps;AROM; Bilateral   Assessment   Prognosis Fair   Problem List Decreased strength;Decreased endurance; Impaired balance;Decreased mobility; Decreased cognition;Decreased safety awareness; Impaired judgement; Impaired vision   Assessment PT much more awake and alert t/o entire session  Improved focus to task and command following  Pt  Performed static standing with mod assist x1 with verbal cues to extend b/l knees and stand tall  Pt progressed with ambulation distances to 10'x1  Pt requires constant cues for sequencing, increased step lengths and b/l knee extension  Pt remains at increased risk for falls  Pt is easily fatigued and requires seated rests  PT remained seated out of bed in recliner after PT session  Chair alarm activated  Recommend rehab at d/c due to increased assistance needed for all aspects of mobility  Goals   Patient Goals none stated   STG Expiration Date 11/25/17   Treatment Day 1   Plan   Treatment/Interventions Functional transfer training;LE strengthening/ROM; Therapeutic exercise; Endurance training;Cognitive reorientation;Patient/family training;Equipment eval/education; Bed mobility;Gait training;OT;Family   Progress Slow progress, decreased activity tolerance   PT Frequency 5x/wk   Recommendation   Recommendation Post acute IP rehab   PT - OK to Discharge Yes  (To rehab)       Fredy Hough, PTA negative...